# Patient Record
Sex: MALE | Race: OTHER | NOT HISPANIC OR LATINO
[De-identification: names, ages, dates, MRNs, and addresses within clinical notes are randomized per-mention and may not be internally consistent; named-entity substitution may affect disease eponyms.]

---

## 2021-01-26 ENCOUNTER — NON-APPOINTMENT (OUTPATIENT)
Age: 66
End: 2021-01-26

## 2021-01-26 ENCOUNTER — TRANSCRIPTION ENCOUNTER (OUTPATIENT)
Age: 66
End: 2021-01-26

## 2021-01-27 ENCOUNTER — APPOINTMENT (OUTPATIENT)
Dept: DISASTER EMERGENCY | Facility: HOSPITAL | Age: 66
End: 2021-01-27

## 2021-01-27 ENCOUNTER — OUTPATIENT (OUTPATIENT)
Dept: INPATIENT UNIT | Facility: HOSPITAL | Age: 66
LOS: 1 days | End: 2021-01-27
Payer: MEDICARE

## 2021-01-27 VITALS
OXYGEN SATURATION: 96 % | HEART RATE: 79 BPM | SYSTOLIC BLOOD PRESSURE: 164 MMHG | TEMPERATURE: 98 F | DIASTOLIC BLOOD PRESSURE: 89 MMHG | RESPIRATION RATE: 18 BRPM

## 2021-01-27 VITALS
SYSTOLIC BLOOD PRESSURE: 132 MMHG | OXYGEN SATURATION: 96 % | TEMPERATURE: 99 F | HEART RATE: 94 BPM | RESPIRATION RATE: 18 BRPM | DIASTOLIC BLOOD PRESSURE: 75 MMHG

## 2021-01-27 DIAGNOSIS — U07.1 COVID-19: ICD-10-CM

## 2021-01-27 PROCEDURE — M0239: CPT

## 2021-01-27 RX ORDER — BAMLANIVIMAB 35 MG/ML
700 INJECTION, SOLUTION INTRAVENOUS ONCE
Refills: 0 | Status: COMPLETED | OUTPATIENT
Start: 2021-01-27 | End: 2021-01-27

## 2021-01-27 RX ORDER — SODIUM CHLORIDE 9 MG/ML
250 INJECTION INTRAMUSCULAR; INTRAVENOUS; SUBCUTANEOUS
Refills: 0 | Status: DISCONTINUED | OUTPATIENT
Start: 2021-01-27 | End: 2021-02-10

## 2021-01-27 RX ADMIN — SODIUM CHLORIDE 25 MILLILITER(S): 9 INJECTION INTRAMUSCULAR; INTRAVENOUS; SUBCUTANEOUS at 11:05

## 2021-01-27 RX ADMIN — BAMLANIVIMAB 270 MILLIGRAM(S): 35 INJECTION, SOLUTION INTRAVENOUS at 10:35

## 2021-01-27 NOTE — CHART NOTE - NSCHARTNOTEFT_GEN_A_CORE
CC: Monoclonal Antibody Infusion/COVID 19 Positive  66yMale with PMHx HTN, HLD, [id number] symptoms of no sense of  taste smell, sore throat    exam/findings:  T(C): 36.9 (01-27-21 @ 10:50), Max: 37.2 (01-27-21 @ 10:18)  HR: 82 (01-27-21 @ 10:50) (82 - 94)  BP: 136/83 (01-27-21 @ 10:50) (132/75 - 136/83)  RR: 18 (01-27-21 @ 10:50) (18 - 18)  SpO2: 94% (01-27-21 @ 10:50) (94% - 96%)      PE:   Appearance: NAD	  HEENT:   Normal oral mucosa,   Lymphatic: No lymphadenopathy  Cardiovascular: Normal S1 S2, No JVD, No murmurs, No edema  Respiratory: Lungs clear to auscultation	  Gastrointestinal:  Soft, Non-tender, + BS	  Skin: warm and dry  Neurologic: Non-focal  Extremities: Normal range of motion, no calf tenderness or edema    ASSESSMENT:  Pt is a 67 y/o  male with PMH of HTN, HLD, T2DM Covid 19 Positive,   referred by Dr. Anthony Justice who presents to infusion center for Monoclonal antibody infusion (bamlanivimab)  symptoms/ Criteria: Sore throat, no sense of smell & taste   Risk Profile includes: T2DM    PLAN:  - infusion procedure explained to patient   -Consent for monoclonal antibody infusion obtained   - Risk & benifits discussed/all questions answered  -infuse  [id number]  IV over one hour   -observe patient for one hour post infusion        I have reviewed the  bamlamnivimabEmergency Use Authorization (EAU) and I have provided the patient or patient's caregiver with the following information:  1. FDA has authorized emergency use of bamlamnivimab, which is not FDA-approved biologic product.  2. The patient or patient's caregiver has the option to accept or refuse administration of bamlamnivimab  3. The significant known and benefits are unknown.  4. Information on available alternative treatments and risks and benefits of those alternatives.    Discharge:  Patient tolerated infusion well denies complaints of chest pain/SOB/dizziness/ palps  Vital signs --- for discharge home ---  D/C instructions given/ fact sheet included.  Patient to follow-up with PCP as needed. CC: Monoclonal Antibody Infusion/COVID 19 Positive  66yMale with PMHx HTN, HLD, O3XSiso symptoms of no sense of  taste smell, sore throat    exam/findings:  T(C): 36.9 (01-27-21 @ 10:50), Max: 37.2 (01-27-21 @ 10:18)  HR: 82 (01-27-21 @ 10:50) (82 - 94)  BP: 136/83 (01-27-21 @ 10:50) (132/75 - 136/83)  RR: 18 (01-27-21 @ 10:50) (18 - 18)  SpO2: 94% (01-27-21 @ 10:50) (94% - 96%)      PE:   Appearance: NAD	  HEENT:   Normal oral mucosa,   Lymphatic: No lymphadenopathy  Cardiovascular: Normal S1 S2, No JVD, No murmurs, No edema  Respiratory: Lungs clear to auscultation	  Gastrointestinal:  Soft, Non-tender, + BS	  Skin: warm and dry  Neurologic: Non-focal  Extremities: Normal range of motion, no calf tenderness or edema    ASSESSMENT:  Pt is a 67 y/o  male with PMH of HTN, HLD, T2DM Covid 19 Positive,   referred by Dr. Anthony Justice who presents to infusion center for Monoclonal antibody infusion (bamlanivimab)  symptoms/ Criteria: Sore throat, no sense of smell & taste   Risk Profile includes: T2DM    PLAN:  - infusion procedure explained to patient   -Consent for monoclonal antibody infusion obtained   - Risk & benifits discussed/all questions answered  -infuse  [id number]  IV over one hour   -observe patient for one hour post infusion        I have reviewed the  bamlamnivimabEmergency Use Authorization (EAU) and I have provided the patient or patient's caregiver with the following information:  1. FDA has authorized emergency use of bamlamnivimab, which is not FDA-approved biologic product.  2. The patient or patient's caregiver has the option to accept or refuse administration of bamlamnivimab  3. The significant known and benefits are unknown.  4. Information on available alternative treatments and risks and benefits of those alternatives.    Discharge:  Patient tolerated infusion well denies complaints of chest pain/SOB/dizziness/ palps  Vital signs Vital Signs Last 24 Hrs  T(C): 36.9 (27 Jan 2021 12:40), Max: 37.2 (27 Jan 2021 10:18)  T(F): 98.4 (27 Jan 2021 12:40), Max: 99 (27 Jan 2021 10:18)  HR: 79 (27 Jan 2021 12:40) (79 - 94)  BP: 164/89 (27 Jan 2021 12:40) (132/75 - 164/89)  BP(mean): --  RR: 18 (27 Jan 2021 12:40) (18 - 18)  SpO2: 96% (27 Jan 2021 12:40) (94% - 97%)- for discharge home at 12:40  D/C instructions given/ fact sheet included.  Patient to follow-up with PCP as needed.

## 2021-01-28 ENCOUNTER — TRANSCRIPTION ENCOUNTER (OUTPATIENT)
Age: 66
End: 2021-01-28

## 2021-02-01 ENCOUNTER — TRANSCRIPTION ENCOUNTER (OUTPATIENT)
Age: 66
End: 2021-02-01

## 2021-05-06 DIAGNOSIS — Z01.818 ENCOUNTER FOR OTHER PREPROCEDURAL EXAMINATION: ICD-10-CM

## 2021-05-10 ENCOUNTER — APPOINTMENT (OUTPATIENT)
Dept: DISASTER EMERGENCY | Facility: CLINIC | Age: 66
End: 2021-05-10

## 2021-05-11 LAB — SARS-COV-2 N GENE NPH QL NAA+PROBE: NOT DETECTED

## 2021-05-12 ENCOUNTER — TRANSCRIPTION ENCOUNTER (OUTPATIENT)
Age: 66
End: 2021-05-12

## 2021-05-13 ENCOUNTER — APPOINTMENT (OUTPATIENT)
Dept: GASTROENTEROLOGY | Facility: HOSPITAL | Age: 66
End: 2021-05-13

## 2021-05-13 ENCOUNTER — RESULT REVIEW (OUTPATIENT)
Age: 66
End: 2021-05-13

## 2021-05-13 ENCOUNTER — OUTPATIENT (OUTPATIENT)
Dept: OUTPATIENT SERVICES | Facility: HOSPITAL | Age: 66
LOS: 1 days | End: 2021-05-13
Payer: MEDICARE

## 2021-05-13 DIAGNOSIS — K83.8 OTHER SPECIFIED DISEASES OF BILIARY TRACT: ICD-10-CM

## 2021-05-13 DIAGNOSIS — K80.20 CALCULUS OF GALLBLADDER W/OUT CHOLECYSTITIS W/OUT OBSTRUCTION: ICD-10-CM

## 2021-05-13 DIAGNOSIS — K86.9 DISEASE OF PANCREAS, UNSPECIFIED: ICD-10-CM

## 2021-05-13 LAB — GLUCOSE BLDC GLUCOMTR-MCNC: 126 MG/DL — HIGH (ref 70–99)

## 2021-05-13 PROCEDURE — 43238 EGD US FINE NEEDLE BX/ASPIR: CPT

## 2021-05-13 PROCEDURE — 43274 ERCP DUCT STENT PLACEMENT: CPT

## 2021-05-13 PROCEDURE — 74330 X-RAY BILE/PANC ENDOSCOPY: CPT

## 2021-05-13 PROCEDURE — 43274 ERCP DUCT STENT PLACEMENT: CPT | Mod: 59

## 2021-05-13 PROCEDURE — 88342 IMHCHEM/IMCYTCHM 1ST ANTB: CPT | Mod: 26

## 2021-05-13 PROCEDURE — 88305 TISSUE EXAM BY PATHOLOGIST: CPT

## 2021-05-13 PROCEDURE — 43264 ERCP REMOVE DUCT CALCULI: CPT

## 2021-05-13 PROCEDURE — 43259 EGD US EXAM DUODENUM/JEJUNUM: CPT | Mod: 59

## 2021-05-13 PROCEDURE — C1769: CPT

## 2021-05-13 PROCEDURE — 88305 TISSUE EXAM BY PATHOLOGIST: CPT | Mod: 26

## 2021-05-13 PROCEDURE — C2617: CPT

## 2021-05-13 PROCEDURE — C9399: CPT

## 2021-05-13 PROCEDURE — C1773: CPT

## 2021-05-13 PROCEDURE — 82962 GLUCOSE BLOOD TEST: CPT

## 2021-05-13 PROCEDURE — 88342 IMHCHEM/IMCYTCHM 1ST ANTB: CPT

## 2021-05-13 NOTE — PHYSICAL EXAM
[General Appearance - Alert] : alert [General Appearance - In No Acute Distress] : in no acute distress [Sclera] : the sclera and conjunctiva were normal [PERRL With Normal Accommodation] : pupils were equal in size, round, and reactive to light [Extraocular Movements] : extraocular movements were intact [Outer Ear] : the ears and nose were normal in appearance [Oropharynx] : the oropharynx was normal [Neck Appearance] : the appearance of the neck was normal [Neck Cervical Mass (___cm)] : no neck mass was observed [Jugular Venous Distention Increased] : there was no jugular-venous distention [Thyroid Diffuse Enlargement] : the thyroid was not enlarged [Thyroid Nodule] : there were no palpable thyroid nodules [Auscultation Breath Sounds / Voice Sounds] : lungs were clear to auscultation bilaterally [Heart Rate And Rhythm] : heart rate was normal and rhythm regular [Heart Sounds] : normal S1 and S2 [Heart Sounds Gallop] : no gallops [Murmurs] : no murmurs [Heart Sounds Pericardial Friction Rub] : no pericardial rub [Bowel Sounds] : normal bowel sounds [Abdomen Soft] : soft [Abdomen Tenderness] : non-tender [] : no hepato-splenomegaly [Abdomen Mass (___ Cm)] : no abdominal mass palpated [Cervical Lymph Nodes Enlarged Posterior Bilaterally] : posterior cervical [Cervical Lymph Nodes Enlarged Anterior Bilaterally] : anterior cervical [Supraclavicular Lymph Nodes Enlarged Bilaterally] : supraclavicular [No CVA Tenderness] : no ~M costovertebral angle tenderness [No Spinal Tenderness] : no spinal tenderness [Abnormal Walk] : normal gait [Nail Clubbing] : no clubbing  or cyanosis of the fingernails [Musculoskeletal - Swelling] : no joint swelling seen [Motor Tone] : muscle strength and tone were normal [No Focal Deficits] : no focal deficits [Oriented To Time, Place, And Person] : oriented to person, place, and time [Impaired Insight] : insight and judgment were intact [Affect] : the affect was normal

## 2021-05-13 NOTE — HISTORY OF PRESENT ILLNESS
[de-identified] : Patient arrived for EGD/EUS/ERCP due to history of biliary pancreatitis and also CBD stones. No complaints. Cleared by cardiology

## 2021-05-18 LAB — SURGICAL PATHOLOGY STUDY: SIGNIFICANT CHANGE UP

## 2021-07-16 ENCOUNTER — APPOINTMENT (OUTPATIENT)
Dept: GASTROENTEROLOGY | Facility: CLINIC | Age: 66
End: 2021-07-16
Payer: MEDICARE

## 2021-07-16 VITALS — BODY MASS INDEX: 31.78 KG/M2 | WEIGHT: 222 LBS | HEIGHT: 70 IN

## 2021-07-16 DIAGNOSIS — A04.8 OTHER SPECIFIED BACTERIAL INTESTINAL INFECTIONS: ICD-10-CM

## 2021-07-16 DIAGNOSIS — Z96.89 PRESENCE OF OTHER SPECIFIED FUNCTIONAL IMPLANTS: ICD-10-CM

## 2021-07-16 DIAGNOSIS — K86.9 DISEASE OF PANCREAS, UNSPECIFIED: ICD-10-CM

## 2021-07-16 DIAGNOSIS — K80.51 CALCULUS OF BILE DUCT W/OUT CHOLANGITIS OR CHOLECYSTITIS WITH OBSTRUCTION: ICD-10-CM

## 2021-07-16 PROCEDURE — 99213 OFFICE O/P EST LOW 20 MIN: CPT | Mod: 95

## 2021-07-16 RX ORDER — CLARITHROMYCIN 500 MG/1
500 TABLET, FILM COATED ORAL TWICE DAILY
Qty: 28 | Refills: 0 | Status: COMPLETED | COMMUNITY
Start: 2021-05-19 | End: 2021-07-16

## 2021-07-16 RX ORDER — OMEPRAZOLE 20 MG/1
20 CAPSULE, DELAYED RELEASE ORAL
Qty: 28 | Refills: 0 | Status: COMPLETED | COMMUNITY
Start: 2021-05-19 | End: 2021-07-16

## 2021-07-16 RX ORDER — AMOXICILLIN 500 MG/1
500 TABLET, FILM COATED ORAL TWICE DAILY
Qty: 56 | Refills: 0 | Status: COMPLETED | COMMUNITY
Start: 2021-05-19 | End: 2021-07-16

## 2021-07-16 RX ORDER — CHLORTHALIDONE 25 MG/1
25 TABLET ORAL
Refills: 0 | Status: ACTIVE | COMMUNITY

## 2021-07-16 RX ORDER — URSODIOL 250 MG/1
250 TABLET ORAL TWICE DAILY
Qty: 6 | Refills: 3 | Status: COMPLETED | COMMUNITY
Start: 2020-10-23 | End: 2021-07-16

## 2021-07-16 RX ORDER — ENALAPRIL MALEATE 10 MG/1
10 TABLET ORAL
Refills: 0 | Status: ACTIVE | COMMUNITY

## 2021-07-16 NOTE — REVIEW OF SYSTEMS
Detail Level: Zone
Initiate Treatment: - finacea gel apply to affected area face and back once to twice daily
Plan: - patient no pregnant, planning or breastfeeding\\n- follow up in 3 months for 1st acne check \\nPatient may cancel appointment if improved\\nDiscussed with patient risks vs benefits vs adverse effects
[Negative] : Heme/Lymph

## 2021-07-19 NOTE — HISTORY OF PRESENT ILLNESS
[Home] : at home, [unfilled] , at the time of the visit. [Medical Office: ([facility])___] : at the medical office located in  [Verbal consent obtained from patient] : the patient, [unfilled] [de-identified] : The patient has been evaluated in the past for bili pancreatitis and CBD stones.  He has undergone EGD/EUS and then subsequent ERCP.  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  Noted to have Helicobacter pylori infection.  Eradication response has not been checked.  ERCP with bile duct stone removal was performed successfully.  Pancreatic duct stent which was placed has migrated.  The patient is denying any complaints.  He had a colonoscopy in the past.  The last colonoscopy was 7 years ago.  There was some problems with the bowel preparation.  He is interested in colonoscopy now.  No GI complaints.

## 2021-07-19 NOTE — ASSESSMENT
[FreeTextEntry1] : Helicobacter pylori infection: We will obtain the breath test for the eradication response.\par \par Screening colonoscopy: Patient is at average risk for colorectal cancer.  The bowel preparation was discussed at length. Risks (including bleeding, pain, perforation, incomplete examination, splenic laceration, adverse reactions to medications, aspiration and death), benefits and alternatives were discussed. Patient is agreeable for the colonoscopy. The patient is medically optimized for the procedure. We will schedule the patient for the procedure. Bowel preparation was sent to the pharmacy.\par \par Biliary pancreatitis, CBD stones: Resolved\par \par Darius Pan MD\par Gastroenterology \par \par

## 2021-09-13 DIAGNOSIS — Z01.818 ENCOUNTER FOR OTHER PREPROCEDURAL EXAMINATION: ICD-10-CM

## 2021-09-14 ENCOUNTER — APPOINTMENT (OUTPATIENT)
Dept: DISASTER EMERGENCY | Facility: CLINIC | Age: 66
End: 2021-09-14

## 2021-09-15 LAB — SARS-COV-2 N GENE NPH QL NAA+PROBE: NOT DETECTED

## 2021-09-16 ENCOUNTER — TRANSCRIPTION ENCOUNTER (OUTPATIENT)
Age: 66
End: 2021-09-16

## 2021-09-17 ENCOUNTER — RESULT REVIEW (OUTPATIENT)
Age: 66
End: 2021-09-17

## 2021-09-17 ENCOUNTER — OUTPATIENT (OUTPATIENT)
Dept: OUTPATIENT SERVICES | Facility: HOSPITAL | Age: 66
LOS: 1 days | End: 2021-09-17
Payer: MEDICARE

## 2021-09-17 ENCOUNTER — APPOINTMENT (OUTPATIENT)
Dept: GASTROENTEROLOGY | Facility: GI CENTER | Age: 66
End: 2021-09-17
Payer: MEDICARE

## 2021-09-17 DIAGNOSIS — Z12.11 ENCOUNTER FOR SCREENING FOR MALIGNANT NEOPLASM OF COLON: ICD-10-CM

## 2021-09-17 DIAGNOSIS — K57.90 DIVERTICULOSIS OF INTESTINE, PART UNSPECIFIED, W/OUT PERFORATION OR ABSCESS W/OUT BLEEDING: ICD-10-CM

## 2021-09-17 DIAGNOSIS — K64.8 OTHER HEMORRHOIDS: ICD-10-CM

## 2021-09-17 DIAGNOSIS — K63.5 POLYP OF COLON: ICD-10-CM

## 2021-09-17 PROCEDURE — 88305 TISSUE EXAM BY PATHOLOGIST: CPT

## 2021-09-17 PROCEDURE — 45380 COLONOSCOPY AND BIOPSY: CPT | Mod: PT

## 2021-09-17 PROCEDURE — 88305 TISSUE EXAM BY PATHOLOGIST: CPT | Mod: 26

## 2021-09-22 LAB — SURGICAL PATHOLOGY STUDY: SIGNIFICANT CHANGE UP

## 2021-09-30 ENCOUNTER — NON-APPOINTMENT (OUTPATIENT)
Age: 66
End: 2021-09-30

## 2021-11-20 ENCOUNTER — TRANSCRIPTION ENCOUNTER (OUTPATIENT)
Age: 66
End: 2021-11-20

## 2022-04-22 ENCOUNTER — TRANSCRIPTION ENCOUNTER (OUTPATIENT)
Age: 67
End: 2022-04-22

## 2022-04-23 ENCOUNTER — TRANSCRIPTION ENCOUNTER (OUTPATIENT)
Age: 67
End: 2022-04-23

## 2023-01-03 ENCOUNTER — INPATIENT (INPATIENT)
Facility: HOSPITAL | Age: 68
LOS: 6 days | Discharge: HOME | DRG: 417 | End: 2023-01-10
Attending: STUDENT IN AN ORGANIZED HEALTH CARE EDUCATION/TRAINING PROGRAM | Admitting: STUDENT IN AN ORGANIZED HEALTH CARE EDUCATION/TRAINING PROGRAM
Payer: MEDICARE

## 2023-01-03 VITALS
HEIGHT: 70 IN | HEART RATE: 118 BPM | OXYGEN SATURATION: 94 % | DIASTOLIC BLOOD PRESSURE: 70 MMHG | SYSTOLIC BLOOD PRESSURE: 137 MMHG | RESPIRATION RATE: 20 BRPM | WEIGHT: 222.01 LBS | TEMPERATURE: 102 F

## 2023-01-03 LAB
ALBUMIN SERPL ELPH-MCNC: 3.9 G/DL — SIGNIFICANT CHANGE UP (ref 3.3–5)
ALP SERPL-CCNC: 99 U/L — SIGNIFICANT CHANGE UP (ref 40–120)
ALT FLD-CCNC: 634 U/L — HIGH (ref 10–45)
ANION GAP SERPL CALC-SCNC: 14 MMOL/L — SIGNIFICANT CHANGE UP (ref 5–17)
ANISOCYTOSIS BLD QL: SLIGHT — SIGNIFICANT CHANGE UP
AST SERPL-CCNC: 371 U/L — HIGH (ref 10–40)
BASE EXCESS BLDV CALC-SCNC: 3.1 MMOL/L — HIGH (ref -2–3)
BASOPHILS # BLD AUTO: 0 K/UL — SIGNIFICANT CHANGE UP (ref 0–0.2)
BASOPHILS NFR BLD AUTO: 0 % — SIGNIFICANT CHANGE UP (ref 0–2)
BILIRUB SERPL-MCNC: 5.6 MG/DL — HIGH (ref 0.2–1.2)
BUN SERPL-MCNC: 25 MG/DL — HIGH (ref 7–23)
BURR CELLS BLD QL SMEAR: PRESENT — SIGNIFICANT CHANGE UP
CA-I SERPL-SCNC: 1.13 MMOL/L — LOW (ref 1.15–1.33)
CALCIUM SERPL-MCNC: 9.2 MG/DL — SIGNIFICANT CHANGE UP (ref 8.4–10.5)
CHLORIDE BLDV-SCNC: 97 MMOL/L — SIGNIFICANT CHANGE UP (ref 96–108)
CHLORIDE SERPL-SCNC: 96 MMOL/L — SIGNIFICANT CHANGE UP (ref 96–108)
CO2 BLDV-SCNC: 28 MMOL/L — HIGH (ref 22–26)
CO2 SERPL-SCNC: 25 MMOL/L — SIGNIFICANT CHANGE UP (ref 22–31)
CREAT SERPL-MCNC: 1.43 MG/DL — HIGH (ref 0.5–1.3)
EGFR: 54 ML/MIN/1.73M2 — LOW
EOSINOPHIL # BLD AUTO: 0.25 K/UL — SIGNIFICANT CHANGE UP (ref 0–0.5)
EOSINOPHIL NFR BLD AUTO: 1.8 % — SIGNIFICANT CHANGE UP (ref 0–6)
GAS PNL BLDV: 132 MMOL/L — LOW (ref 136–145)
GAS PNL BLDV: SIGNIFICANT CHANGE UP
GAS PNL BLDV: SIGNIFICANT CHANGE UP
GLUCOSE BLDV-MCNC: 238 MG/DL — HIGH (ref 70–99)
GLUCOSE SERPL-MCNC: 234 MG/DL — HIGH (ref 70–99)
HCO3 BLDV-SCNC: 27 MMOL/L — SIGNIFICANT CHANGE UP (ref 22–29)
HCT VFR BLD CALC: 41.4 % — SIGNIFICANT CHANGE UP (ref 39–50)
HCT VFR BLDA CALC: 45 % — SIGNIFICANT CHANGE UP (ref 39–51)
HGB BLD CALC-MCNC: 14.9 G/DL — SIGNIFICANT CHANGE UP (ref 12.6–17.4)
HGB BLD-MCNC: 14.4 G/DL — SIGNIFICANT CHANGE UP (ref 13–17)
LACTATE BLDV-MCNC: 1.7 MMOL/L — SIGNIFICANT CHANGE UP (ref 0.5–2)
LIDOCAIN IGE QN: 157 U/L — HIGH (ref 7–60)
LYMPHOCYTES # BLD AUTO: 0.25 K/UL — LOW (ref 1–3.3)
LYMPHOCYTES # BLD AUTO: 1.8 % — LOW (ref 13–44)
MANUAL SMEAR VERIFICATION: SIGNIFICANT CHANGE UP
MCHC RBC-ENTMCNC: 28.7 PG — SIGNIFICANT CHANGE UP (ref 27–34)
MCHC RBC-ENTMCNC: 34.8 GM/DL — SIGNIFICANT CHANGE UP (ref 32–36)
MCV RBC AUTO: 82.6 FL — SIGNIFICANT CHANGE UP (ref 80–100)
MONOCYTES # BLD AUTO: 0.77 K/UL — SIGNIFICANT CHANGE UP (ref 0–0.9)
MONOCYTES NFR BLD AUTO: 5.5 % — SIGNIFICANT CHANGE UP (ref 2–14)
NEUTROPHILS # BLD AUTO: 12.79 K/UL — HIGH (ref 1.8–7.4)
NEUTROPHILS NFR BLD AUTO: 87.3 % — HIGH (ref 43–77)
NEUTS BAND # BLD: 3.6 % — SIGNIFICANT CHANGE UP (ref 0–8)
PCO2 BLDV: 38 MMHG — LOW (ref 42–55)
PH BLDV: 7.46 — HIGH (ref 7.32–7.43)
PLAT MORPH BLD: NORMAL — SIGNIFICANT CHANGE UP
PLATELET # BLD AUTO: 229 K/UL — SIGNIFICANT CHANGE UP (ref 150–400)
PO2 BLDV: 74 MMHG — HIGH (ref 25–45)
POIKILOCYTOSIS BLD QL AUTO: SLIGHT — SIGNIFICANT CHANGE UP
POTASSIUM BLDV-SCNC: 3.2 MMOL/L — LOW (ref 3.5–5.1)
POTASSIUM SERPL-MCNC: 3.3 MMOL/L — LOW (ref 3.5–5.3)
POTASSIUM SERPL-SCNC: 3.3 MMOL/L — LOW (ref 3.5–5.3)
PROT SERPL-MCNC: 7.1 G/DL — SIGNIFICANT CHANGE UP (ref 6–8.3)
RBC # BLD: 5.01 M/UL — SIGNIFICANT CHANGE UP (ref 4.2–5.8)
RBC # FLD: 13.4 % — SIGNIFICANT CHANGE UP (ref 10.3–14.5)
RBC BLD AUTO: ABNORMAL
SAO2 % BLDV: 96.3 % — HIGH (ref 67–88)
SODIUM SERPL-SCNC: 135 MMOL/L — SIGNIFICANT CHANGE UP (ref 135–145)
TROPONIN T, HIGH SENSITIVITY RESULT: 38 NG/L — SIGNIFICANT CHANGE UP (ref 0–51)
WBC # BLD: 14.07 K/UL — HIGH (ref 3.8–10.5)
WBC # FLD AUTO: 14.07 K/UL — HIGH (ref 3.8–10.5)

## 2023-01-03 PROCEDURE — 71045 X-RAY EXAM CHEST 1 VIEW: CPT | Mod: 26

## 2023-01-03 PROCEDURE — 74177 CT ABD & PELVIS W/CONTRAST: CPT | Mod: 26,MA

## 2023-01-03 PROCEDURE — 99284 EMERGENCY DEPT VISIT MOD MDM: CPT | Mod: FS

## 2023-01-03 RX ORDER — POTASSIUM CHLORIDE 20 MEQ
40 PACKET (EA) ORAL ONCE
Refills: 0 | Status: COMPLETED | OUTPATIENT
Start: 2023-01-03 | End: 2023-01-03

## 2023-01-03 RX ORDER — PIPERACILLIN AND TAZOBACTAM 4; .5 G/20ML; G/20ML
3.38 INJECTION, POWDER, LYOPHILIZED, FOR SOLUTION INTRAVENOUS ONCE
Refills: 0 | Status: COMPLETED | OUTPATIENT
Start: 2023-01-03 | End: 2023-01-03

## 2023-01-03 RX ORDER — ACETAMINOPHEN 500 MG
975 TABLET ORAL ONCE
Refills: 0 | Status: COMPLETED | OUTPATIENT
Start: 2023-01-03 | End: 2023-01-03

## 2023-01-03 RX ORDER — SODIUM CHLORIDE 9 MG/ML
1000 INJECTION INTRAMUSCULAR; INTRAVENOUS; SUBCUTANEOUS ONCE
Refills: 0 | Status: COMPLETED | OUTPATIENT
Start: 2023-01-03 | End: 2023-01-03

## 2023-01-03 RX ADMIN — Medication 40 MILLIEQUIVALENT(S): at 22:47

## 2023-01-03 RX ADMIN — SODIUM CHLORIDE 1000 MILLILITER(S): 9 INJECTION INTRAMUSCULAR; INTRAVENOUS; SUBCUTANEOUS at 22:02

## 2023-01-03 RX ADMIN — Medication 975 MILLIGRAM(S): at 22:47

## 2023-01-03 NOTE — ED PROVIDER NOTE - CLINICAL SUMMARY MEDICAL DECISION MAKING FREE TEXT BOX
Carole att-year-old male history of pancreatitis, diabetes, gallstones, cholangitis at outside hospital presenting to the ED with complaints of epigastric pain, nausea vomiting and fever T-max of 102.  Reports abdominal pain constant epigastric with significant improvement with Gas-X.  3 episodes of vomiting today.  Granddaughter at home sick with a cold.  Denies any chest pain shortness of breath or cough.  Denies urinary symptoms or daily alcohol use.  Signed  Patient is well-appearing in no acute distress.  Awake alert and oriented.  Regular rate and rhythm.  Warm to touch.  Breath sounds clear bilaterally with nonlabored breathing.  Saturating well on room air.  Abdomen is soft nontender no rebound or guarding.  No CVA tenderness.  No lower extremity edema or rashes.  No focal neurological deficits.  Given patient's history of concern for acute cholecystitis, cholangitis, choledocholithiasis.  Possible viral infection given recent sick contact.  Will obtain labs, CT abdomen pelvis.  IV fluid, and see pyretic, surgical consult if indicated and reassess.

## 2023-01-03 NOTE — ED ADULT NURSE NOTE - OBJECTIVE STATEMENT
PT is a 67 year old A&OX4 male with PMH of DM and HTN who presents to the ED from home with c/o vomiting, chills, fevers, and generalized weakness. PT states he has been vomiting since yesterday and worse today. PT unable to tolerate PO food or liquids. PT states he had an episode of dizziness and vomiting months ago, and then 3 weeks ago had an episode of vertigo and was seen at Urgent Care. PT also endorsing fevers of 102 orally at home since yesterday. PT denies chest pain, SOB, coughing, runny nose, dizziness, diarrhea, and dysuria. PT is resting comfortably in bed, breathing unlabored on room air, and speaking in complete sentences. Abdomen is soft, non-tender, and non-distended. Skin is warm and dry, no diaphoresis noted. No edema noted to B/L extremities. Strong strength in B/L extremities, sensation intact. IV access established 20G in left wrist. PT ambulatory with steady gait. Safety and comfort maintained. Wife at the bedside.

## 2023-01-03 NOTE — ED PROVIDER NOTE - ATTENDING APP SHARED VISIT CONTRIBUTION OF CARE
I, Kirsten Lam, performed a history and physical exam of the patient and discussed their management with the resident and /or advanced care provider. I reviewed the resident and /or ACP's note and agree with the documented findings and plan of care. I was present and available for all procedures.   see MDM

## 2023-01-03 NOTE — ED ADULT TRIAGE NOTE - WEIGHT IN LBS
222
-Burp after each feeding by supporting the baby on your lap, across your knees or on your shoulder.  Pat or rub the 's back gently.

## 2023-01-03 NOTE — ED PROVIDER NOTE - PROGRESS NOTE DETAILS
Carole attg: pt hypoxic 89-91%, denies feeling SOB, speaking in full sentences. placed on 2L NC|. pending CTAP Christa Nelson MD, Attending: Patient received at attending sign out from Dr. Valle, pt is now awake and alert, fever at home, spiky, + wbc so concerning for cholangitis but given well appeariance, very minumum elevation in lactate, not emergent or icu at this point, will get us/ct, and surg to be involved, but needs medical admission for ERCP, Dr. Anthony Mccoy, Anthony Remy are his GI, which is physician partner, will admit the pt to house hospitalist. Attending/MD Leslie. GI consult has been placed for ERCP via email, pending us result, will involve surgery team, for post-ERCP, potential cholecystectomy, that pt has been aware that he needed to but did not proceed to... Attending/MD Leslie. admission to surg service, GI paged for urgent ERCP.

## 2023-01-03 NOTE — ED PROVIDER NOTE - RAPID ASSESSMENT
67y M w/ pmhx of chronic back pain, pancreatitis, DM on metformin presents to the ED c/o nausea, vomiting, fever (tmax 100 - last night) starting last night. Had 2-3 episodes of emesis. Reports that at 6pm today he had abd pain, took Gasx and the symptoms subsided. Pt took an at home COVID test that was neg. Pt is well appearing in triage.   Jefry Zamorano ([facility]) have documented this rapid assessment note under the dictation of Gregorio Lemos) which has been reviewed and affirmed to be accurate. Patient was seen as a QDOC patient. The patient will be seen and further worked up in the main emergency department and their care will be completed by the main emergency department team along with a thorough physical exam. Receiving team will follow up on labs, analgesia, any clinical imaging, reassess and disposition as clinically indicated, all decisions regarding the progression of care will be made at their discretion. 67y M w/ pmhx of chronic back pain, pancreatitis, DM on metformin presents to the ED c/o nausea, vomiting, fever (tmax 100 - last night) starting last night. Had 2-3 episodes of emesis. Reports that at 6pm today he had abd pain, took Gasx and the symptoms subsided. Pt had previous gall stone but refused to remove it. Pt took an at home COVID test that was neg. Pt is well appearing in triage.   Jefry Zamorano ([facility]) have documented this rapid assessment note under the dictation of Gregorio Lemos) which has been reviewed and affirmed to be accurate. Patient was seen as a QDOC patient. The patient will be seen and further worked up in the main emergency department and their care will be completed by the main emergency department team along with a thorough physical exam. Receiving team will follow up on labs, analgesia, any clinical imaging, reassess and disposition as clinically indicated, all decisions regarding the progression of care will be made at their discretion. 67y M w/ pmhx of chronic back pain, pancreatitis, DM on metformin presents to the ED c/o nausea, vomiting, fever (tmax 100 - last night) starting last night. Had 2-3 episodes of emesis. Reports that at 6pm today he had abd pain, took Gasx and the symptoms subsided. Pt had previous gall stone but refused to remove it. Pt took an at home COVID test that was neg. Pt is well appearing in triage.   Jefry Zamorano ([facility]) have documented this rapid assessment note under the dictation of Gregorio Lemos) which has been reviewed and affirmed to be accurate. Patient was seen as a QDOC patient. The patient will be seen and further worked up in the main emergency department and their care will be completed by the main emergency department team along with a thorough physical exam. Receiving team will follow up on labs, analgesia, any clinical imaging, reassess and disposition as clinically indicated, all decisions regarding the progression of care will be made at their discretion.    Pt seen in triage as Qdoc/Rapid assessment with scribe. Full evaluation to be performed once patient is transferred to main ED  Gregorio Lemos MD, Facep

## 2023-01-03 NOTE — ED PROVIDER NOTE - OBJECTIVE STATEMENT
68 y/o male PMHx pancreatitis, DM, gallstones, ? cholangitis OSH now presenting to the ED with epigastric pain, N/V and fever Tmax 102. Patient reported abdominal pain was constant well localized and improved with Gasx. Patient presented to the ED as he had three episodes of bilious non blood emesis. Patient granddaughter sick with cold prior to onset of symptoms. Patient denied CP, SOB, cough, daily ETOH, urinary symptoms,

## 2023-01-03 NOTE — ED PROVIDER NOTE - NS ED ATTENDING STATEMENT MOD
This was a shared visit with the MILENA. I reviewed and verified the documentation and independently performed the documented:

## 2023-01-03 NOTE — ED ADULT TRIAGE NOTE - CHIEF COMPLAINT QUOTE
generalized abdominal pain, fever, chills, nausea and vomiting beginning yesterday. Hx DM. Denies CP/SOB

## 2023-01-03 NOTE — ED ADULT NURSE NOTE - ED STAT RN HANDOFF DETAILS 2
Report given to receiving change of shift MARISELA CALL patient is in no acute distress. Patient vital signs stable, plan of care explained.

## 2023-01-04 ENCOUNTER — TRANSCRIPTION ENCOUNTER (OUTPATIENT)
Age: 68
End: 2023-01-04

## 2023-01-04 DIAGNOSIS — K81.9 CHOLECYSTITIS, UNSPECIFIED: ICD-10-CM

## 2023-01-04 LAB
ALBUMIN SERPL ELPH-MCNC: 3.2 G/DL — LOW (ref 3.3–5)
ALP SERPL-CCNC: 78 U/L — SIGNIFICANT CHANGE UP (ref 40–120)
ALT FLD-CCNC: 471 U/L — HIGH (ref 10–45)
ANION GAP SERPL CALC-SCNC: 11 MMOL/L — SIGNIFICANT CHANGE UP (ref 5–17)
APPEARANCE UR: CLEAR — SIGNIFICANT CHANGE UP
APTT BLD: 37.1 SEC — HIGH (ref 27.5–35.5)
AST SERPL-CCNC: 250 U/L — HIGH (ref 10–40)
BACTERIA # UR AUTO: NEGATIVE — SIGNIFICANT CHANGE UP
BILIRUB SERPL-MCNC: 4.5 MG/DL — HIGH (ref 0.2–1.2)
BILIRUB UR-MCNC: ABNORMAL
BUN SERPL-MCNC: 20 MG/DL — SIGNIFICANT CHANGE UP (ref 7–23)
CALCIUM SERPL-MCNC: 7.6 MG/DL — LOW (ref 8.4–10.5)
CHLORIDE SERPL-SCNC: 104 MMOL/L — SIGNIFICANT CHANGE UP (ref 96–108)
CO2 SERPL-SCNC: 24 MMOL/L — SIGNIFICANT CHANGE UP (ref 22–31)
COLOR SPEC: ABNORMAL
CREAT SERPL-MCNC: 1.26 MG/DL — SIGNIFICANT CHANGE UP (ref 0.5–1.3)
DIFF PNL FLD: NEGATIVE — SIGNIFICANT CHANGE UP
EGFR: 63 ML/MIN/1.73M2 — SIGNIFICANT CHANGE UP
EPI CELLS # UR: 1 /HPF — SIGNIFICANT CHANGE UP
GLUCOSE BLDC GLUCOMTR-MCNC: 168 MG/DL — HIGH (ref 70–99)
GLUCOSE BLDC GLUCOMTR-MCNC: 180 MG/DL — HIGH (ref 70–99)
GLUCOSE BLDC GLUCOMTR-MCNC: 271 MG/DL — HIGH (ref 70–99)
GLUCOSE SERPL-MCNC: 157 MG/DL — HIGH (ref 70–99)
GLUCOSE UR QL: ABNORMAL
HCT VFR BLD CALC: 39.5 % — SIGNIFICANT CHANGE UP (ref 39–50)
HGB BLD-MCNC: 13.3 G/DL — SIGNIFICANT CHANGE UP (ref 13–17)
HYALINE CASTS # UR AUTO: 1 /LPF — SIGNIFICANT CHANGE UP (ref 0–2)
INR BLD: 1.18 RATIO — HIGH (ref 0.88–1.16)
KETONES UR-MCNC: NEGATIVE — SIGNIFICANT CHANGE UP
LEUKOCYTE ESTERASE UR-ACNC: NEGATIVE — SIGNIFICANT CHANGE UP
MAGNESIUM SERPL-MCNC: 1.7 MG/DL — SIGNIFICANT CHANGE UP (ref 1.6–2.6)
MCHC RBC-ENTMCNC: 28.6 PG — SIGNIFICANT CHANGE UP (ref 27–34)
MCHC RBC-ENTMCNC: 33.7 GM/DL — SIGNIFICANT CHANGE UP (ref 32–36)
MCV RBC AUTO: 84.9 FL — SIGNIFICANT CHANGE UP (ref 80–100)
NITRITE UR-MCNC: NEGATIVE — SIGNIFICANT CHANGE UP
NRBC # BLD: 0 /100 WBCS — SIGNIFICANT CHANGE UP (ref 0–0)
PH UR: 6 — SIGNIFICANT CHANGE UP (ref 5–8)
PHOSPHATE SERPL-MCNC: 2.2 MG/DL — LOW (ref 2.5–4.5)
PLATELET # BLD AUTO: 202 K/UL — SIGNIFICANT CHANGE UP (ref 150–400)
POTASSIUM SERPL-MCNC: 3 MMOL/L — LOW (ref 3.5–5.3)
POTASSIUM SERPL-SCNC: 3 MMOL/L — LOW (ref 3.5–5.3)
PROT SERPL-MCNC: 5.8 G/DL — LOW (ref 6–8.3)
PROT UR-MCNC: ABNORMAL
PROTHROM AB SERPL-ACNC: 13.6 SEC — HIGH (ref 10.5–13.4)
RAPID RVP RESULT: SIGNIFICANT CHANGE UP
RBC # BLD: 4.65 M/UL — SIGNIFICANT CHANGE UP (ref 4.2–5.8)
RBC # FLD: 13.7 % — SIGNIFICANT CHANGE UP (ref 10.3–14.5)
RBC CASTS # UR COMP ASSIST: 2 /HPF — SIGNIFICANT CHANGE UP (ref 0–4)
SARS-COV-2 RNA SPEC QL NAA+PROBE: SIGNIFICANT CHANGE UP
SODIUM SERPL-SCNC: 139 MMOL/L — SIGNIFICANT CHANGE UP (ref 135–145)
SP GR SPEC: 1.05 — HIGH (ref 1.01–1.02)
UROBILINOGEN FLD QL: ABNORMAL
WBC # BLD: 9.54 K/UL — SIGNIFICANT CHANGE UP (ref 3.8–10.5)
WBC # FLD AUTO: 9.54 K/UL — SIGNIFICANT CHANGE UP (ref 3.8–10.5)
WBC UR QL: 5 /HPF — SIGNIFICANT CHANGE UP (ref 0–5)

## 2023-01-04 PROCEDURE — 43237 ENDOSCOPIC US EXAM ESOPH: CPT | Mod: GC

## 2023-01-04 PROCEDURE — 99222 1ST HOSP IP/OBS MODERATE 55: CPT | Mod: GC,25

## 2023-01-04 PROCEDURE — 74328 X-RAY BILE DUCT ENDOSCOPY: CPT | Mod: 26,GC

## 2023-01-04 PROCEDURE — 43264 ERCP REMOVE DUCT CALCULI: CPT | Mod: GC

## 2023-01-04 PROCEDURE — 74183 MRI ABD W/O CNTR FLWD CNTR: CPT | Mod: 26

## 2023-01-04 PROCEDURE — 76705 ECHO EXAM OF ABDOMEN: CPT | Mod: 26

## 2023-01-04 DEVICE — BLLN EXTRACT FUSION QUATRO 8.5 10 12 15MM: Type: IMPLANTABLE DEVICE | Status: FUNCTIONAL

## 2023-01-04 DEVICE — AUTOTOME CANNULATING SPHINCTEROTOME RX 44 20MM: Type: IMPLANTABLE DEVICE | Status: FUNCTIONAL

## 2023-01-04 DEVICE — CATH BLLN EXTRACT DIST GUIDE WIRE 15MM 3LUM: Type: IMPLANTABLE DEVICE | Status: FUNCTIONAL

## 2023-01-04 DEVICE — HYDRATOME 44: Type: IMPLANTABLE DEVICE | Status: FUNCTIONAL

## 2023-01-04 RX ORDER — GLUCAGON INJECTION, SOLUTION 0.5 MG/.1ML
1 INJECTION, SOLUTION SUBCUTANEOUS ONCE
Refills: 0 | Status: DISCONTINUED | OUTPATIENT
Start: 2023-01-04 | End: 2023-01-05

## 2023-01-04 RX ORDER — DEXTROSE 50 % IN WATER 50 %
12.5 SYRINGE (ML) INTRAVENOUS ONCE
Refills: 0 | Status: DISCONTINUED | OUTPATIENT
Start: 2023-01-04 | End: 2023-01-05

## 2023-01-04 RX ORDER — DEXTROSE 50 % IN WATER 50 %
15 SYRINGE (ML) INTRAVENOUS ONCE
Refills: 0 | Status: DISCONTINUED | OUTPATIENT
Start: 2023-01-04 | End: 2023-01-05

## 2023-01-04 RX ORDER — PIPERACILLIN AND TAZOBACTAM 4; .5 G/20ML; G/20ML
3.38 INJECTION, POWDER, LYOPHILIZED, FOR SOLUTION INTRAVENOUS EVERY 8 HOURS
Refills: 0 | Status: DISCONTINUED | OUTPATIENT
Start: 2023-01-04 | End: 2023-01-05

## 2023-01-04 RX ORDER — ATORVASTATIN CALCIUM 80 MG/1
10 TABLET, FILM COATED ORAL AT BEDTIME
Refills: 0 | Status: DISCONTINUED | OUTPATIENT
Start: 2023-01-04 | End: 2023-01-05

## 2023-01-04 RX ORDER — ENOXAPARIN SODIUM 100 MG/ML
40 INJECTION SUBCUTANEOUS EVERY 24 HOURS
Refills: 0 | Status: DISCONTINUED | OUTPATIENT
Start: 2023-01-04 | End: 2023-01-05

## 2023-01-04 RX ORDER — SODIUM CHLORIDE 9 MG/ML
1000 INJECTION, SOLUTION INTRAVENOUS
Refills: 0 | Status: DISCONTINUED | OUTPATIENT
Start: 2023-01-04 | End: 2023-01-05

## 2023-01-04 RX ORDER — DEXTROSE 50 % IN WATER 50 %
25 SYRINGE (ML) INTRAVENOUS ONCE
Refills: 0 | Status: DISCONTINUED | OUTPATIENT
Start: 2023-01-04 | End: 2023-01-05

## 2023-01-04 RX ORDER — MAGNESIUM SULFATE 500 MG/ML
2 VIAL (ML) INJECTION ONCE
Refills: 0 | Status: COMPLETED | OUTPATIENT
Start: 2023-01-04 | End: 2023-01-04

## 2023-01-04 RX ORDER — INSULIN LISPRO 100/ML
VIAL (ML) SUBCUTANEOUS AT BEDTIME
Refills: 0 | Status: DISCONTINUED | OUTPATIENT
Start: 2023-01-04 | End: 2023-01-05

## 2023-01-04 RX ORDER — INSULIN LISPRO 100/ML
VIAL (ML) SUBCUTANEOUS
Refills: 0 | Status: DISCONTINUED | OUTPATIENT
Start: 2023-01-04 | End: 2023-01-04

## 2023-01-04 RX ORDER — LIDOCAINE 4 G/100G
1 CREAM TOPICAL ONCE
Refills: 0 | Status: COMPLETED | OUTPATIENT
Start: 2023-01-04 | End: 2023-01-04

## 2023-01-04 RX ORDER — INDOMETHACIN 50 MG
100 CAPSULE ORAL ONCE
Refills: 0 | Status: DISCONTINUED | OUTPATIENT
Start: 2023-01-04 | End: 2023-01-05

## 2023-01-04 RX ORDER — POTASSIUM PHOSPHATE, MONOBASIC POTASSIUM PHOSPHATE, DIBASIC 236; 224 MG/ML; MG/ML
30 INJECTION, SOLUTION INTRAVENOUS ONCE
Refills: 0 | Status: COMPLETED | OUTPATIENT
Start: 2023-01-04 | End: 2023-01-04

## 2023-01-04 RX ORDER — SODIUM CHLORIDE 9 MG/ML
1000 INJECTION, SOLUTION INTRAVENOUS
Refills: 0 | Status: DISCONTINUED | OUTPATIENT
Start: 2023-01-04 | End: 2023-01-04

## 2023-01-04 RX ORDER — METFORMIN HYDROCHLORIDE 850 MG/1
1 TABLET ORAL
Qty: 0 | Refills: 0 | DISCHARGE

## 2023-01-04 RX ORDER — ACETAMINOPHEN 500 MG
1000 TABLET ORAL ONCE
Refills: 0 | Status: COMPLETED | OUTPATIENT
Start: 2023-01-04 | End: 2023-01-04

## 2023-01-04 RX ORDER — POTASSIUM CHLORIDE 20 MEQ
10 PACKET (EA) ORAL
Refills: 0 | Status: DISCONTINUED | OUTPATIENT
Start: 2023-01-04 | End: 2023-01-07

## 2023-01-04 RX ORDER — INSULIN LISPRO 100/ML
VIAL (ML) SUBCUTANEOUS
Refills: 0 | Status: DISCONTINUED | OUTPATIENT
Start: 2023-01-04 | End: 2023-01-05

## 2023-01-04 RX ADMIN — PIPERACILLIN AND TAZOBACTAM 200 GRAM(S): 4; .5 INJECTION, POWDER, LYOPHILIZED, FOR SOLUTION INTRAVENOUS at 00:06

## 2023-01-04 RX ADMIN — ENOXAPARIN SODIUM 40 MILLIGRAM(S): 100 INJECTION SUBCUTANEOUS at 05:21

## 2023-01-04 RX ADMIN — Medication 1000 MILLIGRAM(S): at 23:21

## 2023-01-04 RX ADMIN — Medication 1: at 22:51

## 2023-01-04 RX ADMIN — PIPERACILLIN AND TAZOBACTAM 25 GRAM(S): 4; .5 INJECTION, POWDER, LYOPHILIZED, FOR SOLUTION INTRAVENOUS at 05:21

## 2023-01-04 RX ADMIN — Medication 975 MILLIGRAM(S): at 06:12

## 2023-01-04 RX ADMIN — Medication 10 MILLIGRAM(S): at 23:10

## 2023-01-04 RX ADMIN — Medication 100 MILLIEQUIVALENT(S): at 12:21

## 2023-01-04 RX ADMIN — Medication 100 MILLIEQUIVALENT(S): at 10:25

## 2023-01-04 RX ADMIN — Medication 1: at 12:22

## 2023-01-04 RX ADMIN — Medication 25 GRAM(S): at 19:57

## 2023-01-04 RX ADMIN — SODIUM CHLORIDE 125 MILLILITER(S): 9 INJECTION, SOLUTION INTRAVENOUS at 08:13

## 2023-01-04 RX ADMIN — Medication 1000 MILLIGRAM(S): at 22:21

## 2023-01-04 RX ADMIN — PIPERACILLIN AND TAZOBACTAM 25 GRAM(S): 4; .5 INJECTION, POWDER, LYOPHILIZED, FOR SOLUTION INTRAVENOUS at 15:50

## 2023-01-04 RX ADMIN — SODIUM CHLORIDE 125 MILLILITER(S): 9 INJECTION, SOLUTION INTRAVENOUS at 19:55

## 2023-01-04 RX ADMIN — SODIUM CHLORIDE 150 MILLILITER(S): 9 INJECTION, SOLUTION INTRAVENOUS at 20:08

## 2023-01-04 RX ADMIN — POTASSIUM PHOSPHATE, MONOBASIC POTASSIUM PHOSPHATE, DIBASIC 83.33 MILLIMOLE(S): 236; 224 INJECTION, SOLUTION INTRAVENOUS at 23:32

## 2023-01-04 RX ADMIN — PIPERACILLIN AND TAZOBACTAM 25 GRAM(S): 4; .5 INJECTION, POWDER, LYOPHILIZED, FOR SOLUTION INTRAVENOUS at 22:52

## 2023-01-04 RX ADMIN — LIDOCAINE 1 PATCH: 4 CREAM TOPICAL at 22:20

## 2023-01-04 NOTE — ED ADULT NURSE REASSESSMENT NOTE - NS ED NURSE REASSESS COMMENT FT1
Report received on patient from BONITA Hogan, patient off unit at [facility]

## 2023-01-04 NOTE — ED ADULT NURSE REASSESSMENT NOTE - NS ED NURSE REASSESS COMMENT FT1
Patient off unit at endoscopy, report given to Yahaira on 2 Antonio, spoke with staff in Endo and they will call 2 [facility] to give their report to admitting RN, patient will be transferred to unit from [facility]. This RN never visualized/met patient as he was off unit at testing.

## 2023-01-04 NOTE — CONSULT NOTE ADULT - ASSESSMENT
68 year old male with PMHx significant for DM, HTN, past choledocho s/p ERCP 1 year ago and pancreatitis, presenting with nausea, vomiting, fevers, and chills of the past day with concern for choledocho vs cholangitis vs cholecystitis.    Impression:  #Possible choledocho/cholangitis - elevated bili and febrile with hx of choledocho.     Recommendation:  - keep NPO  - will perform EUS +/- ERCP today  - c/w ab  - IVF  - cholecystectomy per surgery    Note not finalized until signed by attending.    Tari Latham PGY-6  Gastroenterology/Hepatology Fellow  Pager #[phone] ([facility]) or [phone] ([facility])  Available on [employer] Teams.  Please contact on-call GI fellow via answering service ([phone]) after 5pm and before 8am, and on weekends.

## 2023-01-04 NOTE — PRE PROCEDURE NOTE - PRE PROCEDURE EVALUATION
Attending Physician: Dr Riley                     Procedure: EGD/ERCP    Indication for Procedure: cholangitis  ________________________________________________________  PAST MEDICAL & SURGICAL HISTORY:  Back injury      H/O acute pancreatitis      Diabetes      No significant past surgical history        ALLERGIES:  No Known Allergies    HOME MEDICATIONS:  atorvastatin 10 mg oral tablet: 1 tab(s) orally once a day (at bedtime)  chlorthalidone 25 mg oral tablet: 1 tab(s) orally once a day  enalapril 10 mg oral tablet: 1 tab(s) orally once a day  metFORMIN 500 mg oral tablet, extended release: 1 tab(s) orally once a day  Naprosyn 250 mg oral tablet: 1 tab(s) orally once a week    AICD/PPM: [ ] yes   [x ] no    PERTINENT LAB DATA:                        13.3   9.54  )-----------( 202      ( 04 Jan 2023 05:35 )             39.5     01-04    139  |  104  |  20  ----------------------------<  157<H>  3.0<L>   |  24  |  1.26    Ca    7.6<L>      04 Jan 2023 05:35  Phos  2.2     01-04  Mg     1.7     01-04    TPro  5.8<L>  /  Alb  3.2<L>  /  TBili  4.5<H>  /  DBili  x   /  AST  250<H>  /  ALT  471<H>  /  AlkPhos  78  01-04    PT/INR - ( 04 Jan 2023 14:45 )   PT: 13.6 sec;   INR: 1.18 ratio         PTT - ( 04 Jan 2023 14:45 )  PTT:37.1 sec            PHYSICAL EXAMINATION:    Height (cm): 177.8  Weight (kg): 100.7  BMI (kg/m2): 31.9  BSA (m2): 2.18T(C): 36.9  HR: 73  BP: 186/95  RR: 24  SpO2: 98%    Constitutional: NAD  HEENT: PERRLA, EOMI,    Neck:  No JVD  Respiratory: CTAB/L  Cardiovascular: S1 and S2  Gastrointestinal: BS+, soft, NT/ND  Extremities: No peripheral edema  Neurological: A/O x 3, no focal deficits  Psychiatric: Normal mood, normal affect  Skin: No rashes    ASA Class: I [ ]  II [ ]  III [x ]  IV [ ]    COMMENTS:    The patient is a suitable candidate for the planned procedure unless box checked [ ]  No, explain:

## 2023-01-04 NOTE — H&P ADULT - HISTORY OF PRESENT ILLNESS
Patient is a 68 year old male with PMHx significant for DM, HTN, and pancreatitis, presenting with nausea, vomiting, fevers, and chills of the past day. Symptoms started yesterday evening. Denies abdominal pain. Reports passing flatus and having BMs. Has a history of choledocholithiasis about 1 year ago for which he had an ERCP at that time; however, declined cholecystectomy at that time.    In ED, presented with elevated LFTs and elevated T. Bili. RUQ US in ED demonstrating gallstones. Patient is a 68 year old male with PMHx significant for DM, HTN, and pancreatitis, presenting with nausea, vomiting, fevers, and chills of the past day. Symptoms started yesterday evening. Denies abdominal pain. Reports passing flatus and having BMs. Has a history of choledocholithiasis about 1 year ago for which he had an ERCP at that time; however, declined cholecystectomy at that time.    In ED, patient febrile and found to have elevated LFTs + elevated T. Bili. RUQ US in ED demonstrating gallstones.

## 2023-01-04 NOTE — H&P ADULT - ATTENDING COMMENTS
DATE OF SERVICE: 01-04-23 @ 13:43    Seen and examined. 68M presenting with fever and mild RUQ discomfort. Has hx of choledocholithiasis and underwent ERCP 1 year ago, did not have cholecystectomy at that time. Reports chills at home prompting him to present to ED    Tm 102.1 on arrival with tachycardia  WBC initially 14, now 9.5  Bilirubin elevated 5.6 on admission, 4.5 today  US Shows 7mm cbd, + stones/sludge, mild wall thickening    Abdomen is soft, not significantly TTP, no rebound/guarding    Concern for cholangitis given fever, elevated bilirubin  GI consulted for urgent ERCP, pending today  IV abx  NPO  IVF hydration  Will plan for lap carlene this admission

## 2023-01-04 NOTE — ED ADULT NURSE REASSESSMENT NOTE - NS ED NURSE REASSESS COMMENT FT1
Report received from MARISELA WEST  Pt AAOx4, NAD, resp nonlabored, skin warm/dry, resting comfortably in bed . Pt denies headache, dizziness, chest pain, palpitations, SOB, abd pain, n/v/d, urinary symptoms, fevers, chills, weakness at this time. Pt awaiting for bed  . Safety maintained with call bell within reach

## 2023-01-04 NOTE — CHART NOTE - NSCHARTNOTEFT_GEN_A_CORE
Surgery Post Procedure Note     S/P ERCP     SUBJECTIVE:  Pt seen and examined at the bedside. Pt w/ no complaints. Denies F/C/N/V. Pt denies abdominal pain at this time.    OBJECTIVE:  Vital Signs Last 24 Hrs  T(C): 37 (2023 20:00), Max: 38.9 (2023 20:27)  T(F): 98.6 (2023 20:00), Max: 102.1 (2023 20:27)  HR: 78 (2023 20:00) (66 - 118)  BP: 142/104 (2023 20:00) (102/55 - 186/95)  BP(mean): 70 (2023 15:16) (70 - 80)  RR: 18 (2023 20:00) (16 - 24)  SpO2: 98% (2023 20:00) (93% - 100%)    Parameters below as of 2023 20:00  Patient On (Oxygen Delivery Method): room air        Physical Exam:  General: NAD, resting comfortably in bed  Neuro: A/O x 3, no focal deficits  Pulmonary: Nonlabored breathing, no respiratory distress  Cardiovascular: NSR  Abdominal: soft, nontender, ND  Extremities: WWP    LABS:                        13.3   9.54  )-----------( 202      ( 2023 05:35 )             39.5     01-04    139  |  104  |  20  ----------------------------<  157<H>  3.0<L>   |  24  |  1.26    Ca    7.6<L>      2023 05:35  Phos  2.2     01-04  Mg     1.7     01-04    TPro  5.8<L>  /  Alb  3.2<L>  /  TBili  4.5<H>  /  DBili  x   /  AST  250<H>  /  ALT  471<H>  /  AlkPhos  78  01-04    PT/INR - ( 2023 14:45 )   PT: 13.6 sec;   INR: 1.18 ratio         PTT - ( 2023 14:45 )  PTT:37.1 sec  CAPILLARY BLOOD GLUCOSE      POCT Blood Glucose.: 168 mg/dL (2023 15:24)  POCT Blood Glucose.: 180 mg/dL (2023 12:18)  POCT Blood Glucose.: 243 mg/dL (2023 20:33)    Urinalysis Basic - ( 2023 06:37 )    Color: Dark Yellow / Appearance: Clear / S.047 / pH: x  Gluc: x / Ketone: Negative  / Bili: Moderate / Urobili: 4 mg/dL   Blood: x / Protein: Trace / Nitrite: Negative   Leuk Esterase: Negative / RBC: 2 /hpf / WBC 5 /HPF   Sq Epi: x / Non Sq Epi: 1 /hpf / Bacteria: Negative      LIVER FUNCTIONS - ( 2023 05:35 )  Alb: 3.2 g/dL / Pro: 5.8 g/dL / ALK PHOS: 78 U/L / ALT: 471 U/L / AST: 250 U/L / GGT: x           ASSESSMENT:68y Male now 4hours s/p ERCP. ERCP findings consistent with cholangitis. Patient recovering well on the floor.     PLAN:  - CLD  - Trend CBC/ LFTs  - LR @ 150 for 2 L, then re-evaluate per GI   - OR planning  - Monitor for s/s of post ERCP pancreatitis     [facility], [id number]

## 2023-01-04 NOTE — H&P ADULT - NSHPLABSRESULTS_GEN_ALL_CORE
14.4   14.07 )-----------( 229      ( 03 Jan 2023 22:10 )             41.4   01-03    135  |  96  |  25<H>  ----------------------------<  234<H>  3.3<L>   |  25  |  1.43<H>    Ca    9.2      03 Jan 2023 22:10    TPro  7.1  /  Alb  3.9  /  TBili  5.6<H>  /  DBili  x   /  AST  371<H>  /  ALT  634<H>  /  AlkPhos  99  01-03      IMAGING:  < from: US Abdomen Upper Quadrant Right (01.04.23 @ 02:02) >      ACC: [id number] EXAM:  US ABDOMEN RT UPR QUADRANT                          PROCEDURE DATE:  01/04/2023          INTERPRETATION:  CLINICAL INFORMATION: Abdominal pain. Evaluate for   cholecystitis    COMPARISON: None available.    TECHNIQUE: Sonography of the right upper quadrant.    FINDINGS:  Liver: 21.1 cm Increased echogenicity.  Bile ducts: Normal caliber. Common bile duct measures 7 mm.  Gallbladder: Layering stones and sludge with minimal gallbladder wall   thickening. Negative sonographic Mary sign.  Pancreas: Visualized portions are within normal limits.  Right kidney: 13.1 cm. No hydronephrosis.  Ascites: None.  IVC: Visualized portions are within normal limits.    IMPRESSION:  Cholelithiasis with equivocal sonographic evidence of acute cholecystitis.    Hepatomegaly and hepatic steatosis.    --- End of Report ---           MOSHE AJ MD; Resident Radiologist  This document has been electronically signed.  DEMETRA SHORT MD; Attending Radiologist  This document has been electronically signed. Jan 4 2023  3:39AM    < end of copied text >

## 2023-01-04 NOTE — ED ADULT NURSE REASSESSMENT NOTE - NS ED NURSE REASSESS COMMENT FT1
Patient being transported to endoscopy  with transport personnel. Patient stable upon leaving floor.

## 2023-01-04 NOTE — H&P ADULT - ASSESSMENT
68 year old male with choledocholithiases concerning for potential cholangitis.    Plan:  - Admit to [facility], Dr. Lopez  - Urgent GI consult for ERCP - consulted, recs pending  - Obtain MRCP  - NPO  - IV fluid resuscitation  - IV abx: Zosyn  - Will need formal med rec (patient states home meds include enalapril, HCTZ, and metformin but does not recall doses or frequency)    Discussed with surgery attending on call, Dr. Lopez.      [facility]  [id number]

## 2023-01-04 NOTE — CONSULT NOTE ADULT - ATTENDING COMMENTS
Patient seen and examined on 1/4/2022 in the mid afternoon.    Impression:    #1.  Recent upper abdominal pain  #2.  Fever  #3.  Jaundice with abnormal LFTs  #4.  Cholelithiasis  #5.  History of ERCP with extraction of biliary sludge 1 year ago    Recommendations:    #1.  Remain n.p.o. except medications  #2.  Follow CBC/LFTs  #3.  INR/PTT ordered  #4.  Continue IV antibiotics   #5.  IV fluids   #6.  EGD/Upper EUS/possible ERCP today Patient seen and examined on 1/4/2023 in the mid afternoon.    Impression:    #1.  Recent upper abdominal pain  #2.  Fever  #3.  Jaundice with abnormal LFTs  #4.  Cholelithiasis  #5.  History of ERCP with extraction of biliary sludge 1 year ago    Recommendations:    #1.  Remain n.p.o. except medications  #2.  Follow CBC/LFTs  #3.  INR/PTT ordered  #4.  Continue IV antibiotics   #5.  IV fluids   #6.  EGD/Upper EUS/possible ERCP today

## 2023-01-04 NOTE — H&P ADULT - NSHPPHYSICALEXAM_GEN_ALL_CORE
Gen: NAD  CV: Low grade tachycardic when evaluated  Resp: Nasal cannula - 2L O2 - in place  Abd: Soft, nondistended, nontender  Ext: Warm and perfused

## 2023-01-05 LAB
A1C WITH ESTIMATED AVERAGE GLUCOSE RESULT: 7.3 % — HIGH (ref 4–5.6)
ALBUMIN SERPL ELPH-MCNC: 3.6 G/DL — SIGNIFICANT CHANGE UP (ref 3.3–5)
ALBUMIN SERPL ELPH-MCNC: 3.6 G/DL — SIGNIFICANT CHANGE UP (ref 3.3–5)
ALP SERPL-CCNC: 94 U/L — SIGNIFICANT CHANGE UP (ref 40–120)
ALP SERPL-CCNC: 98 U/L — SIGNIFICANT CHANGE UP (ref 40–120)
ALT FLD-CCNC: 357 U/L — HIGH (ref 10–45)
ALT FLD-CCNC: 394 U/L — HIGH (ref 10–45)
ANION GAP SERPL CALC-SCNC: 13 MMOL/L — SIGNIFICANT CHANGE UP (ref 5–17)
APTT BLD: 36.1 SEC — HIGH (ref 27.5–35.5)
AST SERPL-CCNC: 126 U/L — HIGH (ref 10–40)
AST SERPL-CCNC: 150 U/L — HIGH (ref 10–40)
BILIRUB DIRECT SERPL-MCNC: 1.7 MG/DL — HIGH (ref 0–0.3)
BILIRUB INDIRECT FLD-MCNC: 1.2 MG/DL — HIGH (ref 0.2–1)
BILIRUB SERPL-MCNC: 2.6 MG/DL — HIGH (ref 0.2–1.2)
BILIRUB SERPL-MCNC: 2.9 MG/DL — HIGH (ref 0.2–1.2)
BLD GP AB SCN SERPL QL: NEGATIVE — SIGNIFICANT CHANGE UP
BUN SERPL-MCNC: 19 MG/DL — SIGNIFICANT CHANGE UP (ref 7–23)
CALCIUM SERPL-MCNC: 8.9 MG/DL — SIGNIFICANT CHANGE UP (ref 8.4–10.5)
CHLORIDE SERPL-SCNC: 97 MMOL/L — SIGNIFICANT CHANGE UP (ref 96–108)
CO2 SERPL-SCNC: 23 MMOL/L — SIGNIFICANT CHANGE UP (ref 22–31)
CREAT SERPL-MCNC: 1.36 MG/DL — HIGH (ref 0.5–1.3)
CULTURE RESULTS: NO GROWTH — SIGNIFICANT CHANGE UP
EGFR: 57 ML/MIN/1.73M2 — LOW
ENTEROCOC DNA BLD POS QL NAA+NON-PROBE: SIGNIFICANT CHANGE UP
ESTIMATED AVERAGE GLUCOSE: 163 MG/DL — HIGH (ref 68–114)
GLUCOSE BLDC GLUCOMTR-MCNC: 201 MG/DL — HIGH (ref 70–99)
GLUCOSE BLDC GLUCOMTR-MCNC: 203 MG/DL — HIGH (ref 70–99)
GLUCOSE BLDC GLUCOMTR-MCNC: 235 MG/DL — HIGH (ref 70–99)
GLUCOSE SERPL-MCNC: 208 MG/DL — HIGH (ref 70–99)
GRAM STN FLD: SIGNIFICANT CHANGE UP
HCT VFR BLD CALC: 37.8 % — LOW (ref 39–50)
HCV AB S/CO SERPL IA: 0.09 S/CO — SIGNIFICANT CHANGE UP (ref 0–0.99)
HCV AB SERPL-IMP: SIGNIFICANT CHANGE UP
HGB BLD-MCNC: 12.8 G/DL — LOW (ref 13–17)
INR BLD: 1.09 RATIO — SIGNIFICANT CHANGE UP (ref 0.88–1.16)
MAGNESIUM SERPL-MCNC: 2.3 MG/DL — SIGNIFICANT CHANGE UP (ref 1.6–2.6)
MCHC RBC-ENTMCNC: 28.4 PG — SIGNIFICANT CHANGE UP (ref 27–34)
MCHC RBC-ENTMCNC: 33.9 GM/DL — SIGNIFICANT CHANGE UP (ref 32–36)
MCV RBC AUTO: 84 FL — SIGNIFICANT CHANGE UP (ref 80–100)
METHOD TYPE: SIGNIFICANT CHANGE UP
NRBC # BLD: 0 /100 WBCS — SIGNIFICANT CHANGE UP (ref 0–0)
PHOSPHATE SERPL-MCNC: 4.5 MG/DL — SIGNIFICANT CHANGE UP (ref 2.5–4.5)
PLATELET # BLD AUTO: 214 K/UL — SIGNIFICANT CHANGE UP (ref 150–400)
POTASSIUM SERPL-MCNC: 3.9 MMOL/L — SIGNIFICANT CHANGE UP (ref 3.5–5.3)
POTASSIUM SERPL-SCNC: 3.9 MMOL/L — SIGNIFICANT CHANGE UP (ref 3.5–5.3)
PROT SERPL-MCNC: 6.6 G/DL — SIGNIFICANT CHANGE UP (ref 6–8.3)
PROT SERPL-MCNC: 6.8 G/DL — SIGNIFICANT CHANGE UP (ref 6–8.3)
PROTHROM AB SERPL-ACNC: 12.7 SEC — SIGNIFICANT CHANGE UP (ref 10.5–13.4)
RBC # BLD: 4.5 M/UL — SIGNIFICANT CHANGE UP (ref 4.2–5.8)
RBC # FLD: 13.9 % — SIGNIFICANT CHANGE UP (ref 10.3–14.5)
RH IG SCN BLD-IMP: POSITIVE — SIGNIFICANT CHANGE UP
SODIUM SERPL-SCNC: 133 MMOL/L — LOW (ref 135–145)
SPECIMEN SOURCE: SIGNIFICANT CHANGE UP
WBC # BLD: 9.27 K/UL — SIGNIFICANT CHANGE UP (ref 3.8–10.5)
WBC # FLD AUTO: 9.27 K/UL — SIGNIFICANT CHANGE UP (ref 3.8–10.5)

## 2023-01-05 PROCEDURE — 88304 TISSUE EXAM BY PATHOLOGIST: CPT | Mod: 26

## 2023-01-05 DEVICE — SURGICEL 4 X 8": Type: IMPLANTABLE DEVICE | Status: FUNCTIONAL

## 2023-01-05 DEVICE — VISTASEAL FIBRIN HUMAN 4ML: Type: IMPLANTABLE DEVICE | Status: FUNCTIONAL

## 2023-01-05 DEVICE — CLIP APPLIER COVIDIEN ENDOCLIP II 10MM MED/LG: Type: IMPLANTABLE DEVICE | Status: FUNCTIONAL

## 2023-01-05 RX ORDER — HYDROMORPHONE HYDROCHLORIDE 2 MG/ML
1 INJECTION INTRAMUSCULAR; INTRAVENOUS; SUBCUTANEOUS
Refills: 0 | Status: DISCONTINUED | OUTPATIENT
Start: 2023-01-05 | End: 2023-01-05

## 2023-01-05 RX ORDER — ONDANSETRON 8 MG/1
4 TABLET, FILM COATED ORAL ONCE
Refills: 0 | Status: DISCONTINUED | OUTPATIENT
Start: 2023-01-05 | End: 2023-01-05

## 2023-01-05 RX ORDER — DEXTROSE 50 % IN WATER 50 %
15 SYRINGE (ML) INTRAVENOUS ONCE
Refills: 0 | Status: DISCONTINUED | OUTPATIENT
Start: 2023-01-05 | End: 2023-01-10

## 2023-01-05 RX ORDER — PIPERACILLIN AND TAZOBACTAM 4; .5 G/20ML; G/20ML
3.38 INJECTION, POWDER, LYOPHILIZED, FOR SOLUTION INTRAVENOUS EVERY 8 HOURS
Refills: 0 | Status: DISCONTINUED | OUTPATIENT
Start: 2023-01-05 | End: 2023-01-10

## 2023-01-05 RX ORDER — ATORVASTATIN CALCIUM 80 MG/1
10 TABLET, FILM COATED ORAL AT BEDTIME
Refills: 0 | Status: DISCONTINUED | OUTPATIENT
Start: 2023-01-05 | End: 2023-01-10

## 2023-01-05 RX ORDER — INSULIN LISPRO 100/ML
VIAL (ML) SUBCUTANEOUS
Refills: 0 | Status: DISCONTINUED | OUTPATIENT
Start: 2023-01-05 | End: 2023-01-10

## 2023-01-05 RX ORDER — LIDOCAINE 4 G/100G
1 CREAM TOPICAL EVERY 24 HOURS
Refills: 0 | Status: DISCONTINUED | OUTPATIENT
Start: 2023-01-05 | End: 2023-01-05

## 2023-01-05 RX ORDER — LIDOCAINE 4 G/100G
1 CREAM TOPICAL EVERY 24 HOURS
Refills: 0 | Status: DISCONTINUED | OUTPATIENT
Start: 2023-01-05 | End: 2023-01-07

## 2023-01-05 RX ORDER — SODIUM CHLORIDE 9 MG/ML
1000 INJECTION, SOLUTION INTRAVENOUS
Refills: 0 | Status: DISCONTINUED | OUTPATIENT
Start: 2023-01-05 | End: 2023-01-10

## 2023-01-05 RX ORDER — OXYCODONE HYDROCHLORIDE 5 MG/1
5 TABLET ORAL EVERY 4 HOURS
Refills: 0 | Status: DISCONTINUED | OUTPATIENT
Start: 2023-01-05 | End: 2023-01-10

## 2023-01-05 RX ORDER — HYDROMORPHONE HYDROCHLORIDE 2 MG/ML
0.5 INJECTION INTRAMUSCULAR; INTRAVENOUS; SUBCUTANEOUS
Refills: 0 | Status: DISCONTINUED | OUTPATIENT
Start: 2023-01-05 | End: 2023-01-05

## 2023-01-05 RX ORDER — OXYCODONE HYDROCHLORIDE 5 MG/1
2.5 TABLET ORAL EVERY 4 HOURS
Refills: 0 | Status: DISCONTINUED | OUTPATIENT
Start: 2023-01-05 | End: 2023-01-10

## 2023-01-05 RX ORDER — DEXTROSE 50 % IN WATER 50 %
25 SYRINGE (ML) INTRAVENOUS ONCE
Refills: 0 | Status: DISCONTINUED | OUTPATIENT
Start: 2023-01-05 | End: 2023-01-10

## 2023-01-05 RX ORDER — GLUCAGON INJECTION, SOLUTION 0.5 MG/.1ML
1 INJECTION, SOLUTION SUBCUTANEOUS ONCE
Refills: 0 | Status: DISCONTINUED | OUTPATIENT
Start: 2023-01-05 | End: 2023-01-10

## 2023-01-05 RX ORDER — DEXTROSE 50 % IN WATER 50 %
12.5 SYRINGE (ML) INTRAVENOUS ONCE
Refills: 0 | Status: DISCONTINUED | OUTPATIENT
Start: 2023-01-05 | End: 2023-01-10

## 2023-01-05 RX ORDER — ACETAMINOPHEN 500 MG
650 TABLET ORAL EVERY 6 HOURS
Refills: 0 | Status: DISCONTINUED | OUTPATIENT
Start: 2023-01-05 | End: 2023-01-10

## 2023-01-05 RX ORDER — INSULIN LISPRO 100/ML
VIAL (ML) SUBCUTANEOUS AT BEDTIME
Refills: 0 | Status: DISCONTINUED | OUTPATIENT
Start: 2023-01-05 | End: 2023-01-10

## 2023-01-05 RX ADMIN — ATORVASTATIN CALCIUM 10 MILLIGRAM(S): 80 TABLET, FILM COATED ORAL at 21:48

## 2023-01-05 RX ADMIN — OXYCODONE HYDROCHLORIDE 5 MILLIGRAM(S): 5 TABLET ORAL at 22:40

## 2023-01-05 RX ADMIN — LIDOCAINE 1 PATCH: 4 CREAM TOPICAL at 19:33

## 2023-01-05 RX ADMIN — LIDOCAINE 1 PATCH: 4 CREAM TOPICAL at 21:37

## 2023-01-05 RX ADMIN — Medication 650 MILLIGRAM(S): at 19:39

## 2023-01-05 RX ADMIN — LIDOCAINE 1 PATCH: 4 CREAM TOPICAL at 10:04

## 2023-01-05 RX ADMIN — HYDROMORPHONE HYDROCHLORIDE 0.5 MILLIGRAM(S): 2 INJECTION INTRAMUSCULAR; INTRAVENOUS; SUBCUTANEOUS at 17:15

## 2023-01-05 RX ADMIN — HYDROMORPHONE HYDROCHLORIDE 0.5 MILLIGRAM(S): 2 INJECTION INTRAMUSCULAR; INTRAVENOUS; SUBCUTANEOUS at 17:32

## 2023-01-05 RX ADMIN — OXYCODONE HYDROCHLORIDE 5 MILLIGRAM(S): 5 TABLET ORAL at 21:47

## 2023-01-05 RX ADMIN — Medication 2: at 16:37

## 2023-01-05 RX ADMIN — PIPERACILLIN AND TAZOBACTAM 25 GRAM(S): 4; .5 INJECTION, POWDER, LYOPHILIZED, FOR SOLUTION INTRAVENOUS at 05:09

## 2023-01-05 RX ADMIN — Medication 650 MILLIGRAM(S): at 19:36

## 2023-01-05 RX ADMIN — Medication 10 MILLIGRAM(S): at 22:00

## 2023-01-05 RX ADMIN — LIDOCAINE 1 PATCH: 4 CREAM TOPICAL at 06:47

## 2023-01-05 RX ADMIN — PIPERACILLIN AND TAZOBACTAM 25 GRAM(S): 4; .5 INJECTION, POWDER, LYOPHILIZED, FOR SOLUTION INTRAVENOUS at 21:47

## 2023-01-05 RX ADMIN — HYDROMORPHONE HYDROCHLORIDE 0.5 MILLIGRAM(S): 2 INJECTION INTRAMUSCULAR; INTRAVENOUS; SUBCUTANEOUS at 16:38

## 2023-01-05 RX ADMIN — Medication 2: at 09:20

## 2023-01-05 RX ADMIN — LIDOCAINE 1 PATCH: 4 CREAM TOPICAL at 10:37

## 2023-01-05 RX ADMIN — HYDROMORPHONE HYDROCHLORIDE 0.5 MILLIGRAM(S): 2 INJECTION INTRAMUSCULAR; INTRAVENOUS; SUBCUTANEOUS at 16:28

## 2023-01-05 RX ADMIN — ENOXAPARIN SODIUM 40 MILLIGRAM(S): 100 INJECTION SUBCUTANEOUS at 04:36

## 2023-01-05 NOTE — PROGRESS NOTE ADULT - ASSESSMENT
68M Hx significant for DM, HTN, past Hx choledocho s/p ERCP 1 year ago and pancreatitis, presenting with n/v/f/c found to have ascending cholangitis 2/2 choledocholithiasis s/p ERCP (1/4/23) with balloon sweeps.       Impression:  #Cholangitis 2/2 choledocholithiasis: pw fevers, RUQ abd pain, n/v with elevated liver enzymes (Tbili 5.6). Underwent EUS/ERCP (1/4/23): found to have ascending cholangitis. Sludge, stones, and pus were completely removed from the dilated common bile duct with balloon sweeps.    #Esophagitis: grade B seen on EGD (1/4/23)      Recommendations:   - if tolerating clear liquid diet, ok to advance   - Trend CBC/LFTs   - Continue antibiotics for cholangitis for total course of 7-10 days.   - Cholecystectomy, timing per surgery.        *Note not final unless signed by attending    Thank you for involving us in the care of this patient, please reach out if any further questions.     Shay Bennett MD  Gastroenterology/Hepatology Fellow, PGY6    Available on [employer] Teams  [phone] ([facility])  [id number] ([facility])  Please contact on call fellow weekdays after 5pm-7am and weekends: [phone]

## 2023-01-05 NOTE — PROGRESS NOTE ADULT - ASSESSMENT
68 year old male with choledocholithiases concerning for potential cholangitis now s/p ERCP 01/05.     Plan:  - CLD  - IVF  - IV abx: Zosyn  - interval lap carlene plan   - f/u GI recs  - f/u am labs  - monitor vital signs   - PT eval    [facility]  [id number]

## 2023-01-05 NOTE — PRE-ANESTHESIA EVALUATION ADULT - NSANTHOSAYNRD_GEN_A_CORE
No. CHARI screening performed.  STOP BANG Legend: 0-2 = LOW Risk; 3-4 = INTERMEDIATE Risk; 5-8 = HIGH Risk
No. CHARI screening performed.  STOP BANG Legend: 0-2 = LOW Risk; 3-4 = INTERMEDIATE Risk; 5-8 = HIGH Risk

## 2023-01-05 NOTE — PROGRESS NOTE ADULT - SUBJECTIVE AND OBJECTIVE BOX
Interval Events:   NAEON, afebrile HD stable  Pending labs    EGD (23): LA Grade B esophagitis. Non-bleeding erosive gastropathy. Duodenal erosions without bleeding.  EUS (23): sludge and stone debris in a dilated common bile duct. Sludge in the gallbladder   ERCP (23): ascending cholangitis. Sludge, stones, and pus were completely removed from the dilated common bile duct with balloon sweeps.    Allergies:  No Known Allergies        Hospital Medications:  atorvastatin 10 milliGRAM(s) Oral at bedtime  dextrose 5%. 1000 milliLiter(s) IV Continuous <Continuous>  dextrose 5%. 1000 milliLiter(s) IV Continuous <Continuous>  dextrose 50% Injectable 25 Gram(s) IV Push once  dextrose 50% Injectable 12.5 Gram(s) IV Push once  dextrose 50% Injectable 25 Gram(s) IV Push once  dextrose Oral Gel 15 Gram(s) Oral once PRN  enalapril 10 milliGRAM(s) Oral daily  enoxaparin Injectable 40 milliGRAM(s) SubCutaneous every 24 hours  glucagon  Injectable 1 milliGRAM(s) IntraMuscular once  indomethacin Suppository 100 milliGRAM(s) Rectal once  insulin lispro (ADMELOG) corrective regimen sliding scale   SubCutaneous three times a day before meals  insulin lispro (ADMELOG) corrective regimen sliding scale   SubCutaneous at bedtime  lactated ringers. 1000 milliLiter(s) IV Continuous <Continuous>  piperacillin/tazobactam IVPB.. 3.375 Gram(s) IV Intermittent every 8 hours  potassium chloride  10 mEq/100 mL IVPB 10 milliEquivalent(s) IV Intermittent every 1 hour      PMHX/PSHX:  Back injury    H/O acute pancreatitis    Diabetes    No significant past surgical history        Family history:      ROS:     General:  No wt loss, fevers, chills, night sweats, fatigue,   Eyes:  Good vision, no reported pain  ENT:  No sore throat, pain, runny nose, dysphagia  CV:  No pain, palpitations, hypo/hypertension  Pulm:  No dyspnea, cough, tachypnea, wheezing  GI: see above  :  No pain, bleeding, incontinence, nocturia  Muscle:  No pain, weakness  Neuro:  No weakness, tingling, memory problems  Psych:  No fatigue, insomnia, mood problems, depression  Endocrine:  No polyuria, polydipsia, cold/heat intolerance  Heme:  No petechiae, ecchymosis, easy bruisability  Skin:  No rash, tattoos, scars, edema      PHYSICAL EXAM:   Vital Signs:  Vital Signs Last 24 Hrs  T(C): 36.8 (2023 05:08), Max: 37 (2023 20:00)  T(F): 98.2 (2023 05:08), Max: 98.6 (2023 20:00)  HR: 67 (2023 05:08) (61 - 78)  BP: 130/79 (2023 05:08) (121/76 - 186/95)  BP(mean): 70 (2023 15:16) (70 - 70)  RR: 18 (2023 05:08) (18 - 24)  SpO2: 97% (2023 05:08) (97% - 100%)    Parameters below as of 2023 05:08  Patient On (Oxygen Delivery Method): room air      Daily Height in cm: 177.8 (2023 15:16)    Daily     GENERAL:  No acute distress  HEENT:  Normocephalic/atraumtic,  no scleral icterus  CHEST:  Clear to auscultation bilaterally, no wheezes/rales/ronchi, no accessory muscle use  HEART:  Regular rate and rhythm, no murmurs/rubs/gallops  ABDOMEN:  Soft, non-tender, non-distended  EXTREMITIES:  No cyanosis, clubbing, or edema  SKIN:  No rash/erythema/ecchymoses/petechiae/wounds/abscess/warm/dry  NEURO:  Alert and oriented x 3, no asterixis, no tremor    LABS:                        13.3   9.54  )-----------( 202      ( 2023 05:35 )             39.5     Mean Cell Volume: 84.9 fl (23 @ 05:35)    -    139  |  104  |  20  ----------------------------<  157<H>  3.0<L>   |  24  |  1.26    Ca    7.6<L>      2023 05:35  Phos  2.2     -04  Mg     1.7     -04    TPro  5.8<L>  /  Alb  3.2<L>  /  TBili  4.5<H>  /  DBili  x   /  AST  250<H>  /  ALT  471<H>  /  AlkPhos  78  -04    LIVER FUNCTIONS - ( 2023 05:35 )  Alb: 3.2 g/dL / Pro: 5.8 g/dL / ALK PHOS: 78 U/L / ALT: 471 U/L / AST: 250 U/L / GGT: x           PT/INR - ( 2023 14:45 )   PT: 13.6 sec;   INR: 1.18 ratio         PTT - ( 2023 14:45 )  PTT:37.1 sec  Urinalysis Basic - ( 2023 06:37 )    Color: Dark Yellow / Appearance: Clear / S.047 / pH: x  Gluc: x / Ketone: Negative  / Bili: Moderate / Urobili: 4 mg/dL   Blood: x / Protein: Trace / Nitrite: Negative   Leuk Esterase: Negative / RBC: 2 /hpf / WBC 5 /HPF   Sq Epi: x / Non Sq Epi: 1 /hpf / Bacteria: Negative                              13.3   9.54  )-----------( 202      ( 2023 05:35 )             39.5                         14.4   14.07 )-----------( 229      ( 2023 22:10 )             41.4       Imaging:             Interval Events:   NAEON, afebrile HD stable  Denies abd pain/nv    EGD (23): LA Grade B esophagitis. Non-bleeding erosive gastropathy. Duodenal erosions without bleeding.  EUS (23): sludge and stone debris in a dilated common bile duct. Sludge in the gallbladder   ERCP (23): ascending cholangitis. Sludge, stones, and pus were completely removed from the dilated common bile duct with balloon sweeps.    Allergies:  No Known Allergies        Hospital Medications:  atorvastatin 10 milliGRAM(s) Oral at bedtime  dextrose 5%. 1000 milliLiter(s) IV Continuous <Continuous>  dextrose 5%. 1000 milliLiter(s) IV Continuous <Continuous>  dextrose 50% Injectable 25 Gram(s) IV Push once  dextrose 50% Injectable 12.5 Gram(s) IV Push once  dextrose 50% Injectable 25 Gram(s) IV Push once  dextrose Oral Gel 15 Gram(s) Oral once PRN  enalapril 10 milliGRAM(s) Oral daily  enoxaparin Injectable 40 milliGRAM(s) SubCutaneous every 24 hours  glucagon  Injectable 1 milliGRAM(s) IntraMuscular once  indomethacin Suppository 100 milliGRAM(s) Rectal once  insulin lispro (ADMELOG) corrective regimen sliding scale   SubCutaneous three times a day before meals  insulin lispro (ADMELOG) corrective regimen sliding scale   SubCutaneous at bedtime  lactated ringers. 1000 milliLiter(s) IV Continuous <Continuous>  piperacillin/tazobactam IVPB.. 3.375 Gram(s) IV Intermittent every 8 hours  potassium chloride  10 mEq/100 mL IVPB 10 milliEquivalent(s) IV Intermittent every 1 hour      PMHX/PSHX:  Back injury    H/O acute pancreatitis    Diabetes    No significant past surgical history        Family history:      ROS:     General:  No wt loss, fevers, chills, night sweats, fatigue,   Eyes:  Good vision, no reported pain  ENT:  No sore throat, pain, runny nose, dysphagia  CV:  No pain, palpitations, hypo/hypertension  Pulm:  No dyspnea, cough, tachypnea, wheezing  GI: see above  :  No pain, bleeding, incontinence, nocturia  Muscle:  No pain, weakness  Neuro:  No weakness, tingling, memory problems  Psych:  No fatigue, insomnia, mood problems, depression  Endocrine:  No polyuria, polydipsia, cold/heat intolerance  Heme:  No petechiae, ecchymosis, easy bruisability  Skin:  No rash, tattoos, scars, edema      PHYSICAL EXAM:   Vital Signs:  Vital Signs Last 24 Hrs  T(C): 36.8 (2023 05:08), Max: 37 (2023 20:00)  T(F): 98.2 (2023 05:08), Max: 98.6 (2023 20:00)  HR: 67 (2023 05:08) (61 - 78)  BP: 130/79 (2023 05:08) (121/76 - 186/95)  BP(mean): 70 (2023 15:16) (70 - 70)  RR: 18 (2023 05:08) (18 - 24)  SpO2: 97% (2023 05:08) (97% - 100%)    Parameters below as of 2023 05:08  Patient On (Oxygen Delivery Method): room air      Daily Height in cm: 177.8 (2023 15:16)    Daily     GENERAL:  No acute distress  HEENT:  Normocephalic/atraumtic,  no scleral icterus  CHEST:  Clear to auscultation bilaterally, no wheezes/rales/ronchi, no accessory muscle use  HEART:  Regular rate and rhythm, no murmurs/rubs/gallops  ABDOMEN:  Soft, non-tender, non-distended  EXTREMITIES:  No cyanosis, clubbing, or edema  SKIN:  No rash/erythema/ecchymoses/petechiae/wounds/abscess/warm/dry  NEURO:  Alert and oriented x 3, no asterixis, no tremor    LABS:                        13.3   9.54  )-----------(       ( 2023 05:35 )             39.5     Mean Cell Volume: 84.9 fl (23 @ 05:35)        139  |  104  |  20  ----------------------------<  157<H>  3.0<L>   |  24  |  1.26    Ca    7.6<L>      2023 05:35  Phos  2.2     -  Mg     1.7     -04    TPro  5.8<L>  /  Alb  3.2<L>  /  TBili  4.5<H>  /  DBili  x   /  AST  250<H>  /  ALT  471<H>  /  AlkPhos  78  -    LIVER FUNCTIONS - ( 2023 05:35 )  Alb: 3.2 g/dL / Pro: 5.8 g/dL / ALK PHOS: 78 U/L / ALT: 471 U/L / AST: 250 U/L / GGT: x           PT/INR - ( 2023 14:45 )   PT: 13.6 sec;   INR: 1.18 ratio         PTT - ( 2023 14:45 )  PTT:37.1 sec  Urinalysis Basic - ( 2023 06:37 )    Color: Dark Yellow / Appearance: Clear / S.047 / pH: x  Gluc: x / Ketone: Negative  / Bili: Moderate / Urobili: 4 mg/dL   Blood: x / Protein: Trace / Nitrite: Negative   Leuk Esterase: Negative / RBC: 2 /hpf / WBC 5 /HPF   Sq Epi: x / Non Sq Epi: 1 /hpf / Bacteria: Negative                              13.3   9.54  )-----------( 202      ( 2023 05:35 )             39.5                         14.4   14.07 )-----------( 229      ( 2023 22:10 )             41.4       Imaging:

## 2023-01-05 NOTE — PRE-ANESTHESIA EVALUATION ADULT - NSANTHDISPORD_GEN_ALL_CORE
PACU
Progress Note    Subjective:  No acute overnight events   Afebrile since admission  Patient states she feels good, no new complaints  Cr stable today      Review of Systems:  A comprehensive 13 point ROS was done and is as per HPI otherwise negative.     Home Medications:  Medications Prior to Admission   Medication Sig Dispense Refill   • vancomycin (VANCOCIN) 125 MG capsule Indications: Clostridium Difficile Infection Take as previously instructed through 9/25/22 20 capsule 0   • traMADol (ULTRAM) 50 MG tablet Take 50 mg by mouth every 6 hours as needed for Pain.     • oxyCODONE, IMM REL, (ROXICODONE) 5 MG immediate release tablet Take 5 mg by mouth as needed for Pain.     • allopurinol (ZYLOPRIM) 300 MG tablet Take 1 tablet by mouth daily. Do not start before August 23, 2022. (Patient taking differently: Take 300 mg by mouth daily after breakfast.) 30 tablet 0   • insulin glargine (LANTUS) 100 UNIT/ML vial solution Inject 25-35 Units into the skin at bedtime as needed. Uses only if she is steroids     • atorvastatin (LIPITOR) 40 MG tablet TAKE 1 TABLET BY MOUTH DAILY (Patient taking differently: Take 40 mg by mouth at bedtime.) 90 tablet 1   • metoPROLOL succinate (TOPROL-XL) 25 MG 24 hr tablet Take 1 tablet by mouth daily. Hold for 1-2 days, or longer if SBP < 120 (Patient taking differently: Take 25 mg by mouth daily (with lunch).) 90 tablet 1   • Insulin Syringe 29G X 1/2\" 0.3 ML Misc      • Insulin Pen Needle 31G X 5 MM Misc Use to inject insulin 1 times daily. Remove needle cover(s) to expose needle before injecting. 100 each 0   • sitaGLIPtin (JANUVIA) 100 MG tablet Take 0.5 tablets by mouth daily. (Patient taking differently: Take 50 mg by mouth every morning.) 30 tablet 3   • ferrous sulfate 324 (65 Fe) MG EC tablet TAKE 1 TABLET BY MOUTH THREE TIMES DAILY WITH MEALS (Patient taking differently: Take 324 mg by mouth daily (with lunch). Indications: Hold for 1 week after surgery or until cleared by  
PACU
[place].) 90 tablet 2   • True Metrix Blood Glucose Test test strip 1 strip 4 times daily. Test blood sugar 4 times daily. Diagnosis: E11.9 400 each 1   • TRUEplus Lancets 30G Misc Inject 1 each into the skin 4 times daily. 400 each 1   • levothyroxine 88 MCG tablet Take 1 tablet by mouth daily (before breakfast). 90 tablet 3   • Ascorbic Acid (VITAMIN C PO) Take 1 tablet by mouth daily after dinner.      • B Complex Vitamins (B COMPLEX PO) Take 1 tablet by mouth daily after dinner.      • Multiple Vitamin (MULTIVITAMIN PO) Take 1 tablet by mouth daily after dinner.      • Cholecalciferol (VITAMIN D3) 2000 units Tab Take 50 mcg by mouth daily after dinner.          Inpatient Medications:  Current Facility-Administered Medications   Medication Dose Route Frequency Provider Last Rate Last Admin   • sodium chloride 0.9% infusion   Intravenous Continuous PRN Hansa Hernandez       • potassium & sodium phosphates (PHOS-NAK) 280-160-250 MG powder packet 1 packet  1 packet Oral Q8H Lei Rowan MD   1 packet at 09/30/22 1152   • traMADol (ULTRAM) tablet 100 mg  100 mg Oral Q6H PRN Yessica Pulido MD   100 mg at 09/30/22 1159   • filgrastim (G-CSF) (NEUPOGEN) injection 480 mcg  480 mcg Subcutaneous Daily at noon Ashley aMgaña MD   480 mcg at 09/30/22 1151   • cefepime (MAXIPIME) 2,000 mg in sodium chloride 0.9 % 100 mL IVPB  2,000 mg Intravenous 2 times per day Silvana Singh MD 25 mL/hr at 09/30/22 0850 2,000 mg at 09/30/22 0850   • acetaminophen (TYLENOL) tablet 650 mg  650 mg Oral Q6H PRN Yessica Pulido MD   650 mg at 09/30/22 0943   • vancomycin (VANCOCIN) capsule 125 mg  125 mg Oral 2 times per day Silvana Singh MD   125 mg at 09/30/22 0857   • oxyCODONE (IMM REL) (ROXICODONE) tablet 5 mg  5 mg Oral Q6H PRN Dee Dee P Lynn   5 mg at 09/29/22 1835   • diphenhydrAMINE (BENADRYL) capsule 25 mg  25 mg Oral PRN oLry Mcdonald DO   25 mg at 09/30/22 0943   • fluconazole (DIFLUCAN) tablet 400 mg  400 mg Oral Daily iSlvana Singh 
MD   400 mg at 09/30/22 0858   • ondansetron (ZOFRAN) tablet 8 mg  8 mg Oral Q8H PRN Yessica Pulido MD   8 mg at 09/29/22 1151   • sodium chloride 0.9 % flush bag 25 mL  25 mL Intravenous PRN Dee Dee P Lynn       • sodium chloride (PF) 0.9 % injection 2 mL  2 mL Intracatheter 2 times per day Dee Dee P Lynn   2 mL at 09/30/22 0850   • atorvastatin (LIPITOR) tablet 40 mg  40 mg Oral QHS Dee Dee P Lynn   40 mg at 09/29/22 2038   • levothyroxine (SYNTHROID, LEVOTHROID) tablet 88 mcg  88 mcg Oral QAM AC Dee Dee P Lynn   88 mcg at 09/30/22 0516   • [Held by provider] traMADol (ULTRAM) tablet 50 mg  50 mg Oral Q6H PRN Dee Dee P Lynn   50 mg at 09/29/22 0512   • dextrose 50 % injection 25 g  25 g Intravenous PRN Dee Dee P Lynn       • dextrose 50 % injection 12.5 g  12.5 g Intravenous PRN Dee Dee P Lynn       • glucagon (GLUCAGEN) injection 1 mg  1 mg Intramuscular PRN Dee Dee P Lynn       • dextrose (GLUTOSE) 40 % gel 15 g  15 g Oral PRN Dee Dee P Lynn       • dextrose (GLUTOSE) 40 % gel 30 g  30 g Oral PRN Dee Dee P Lynn       • insulin lispro (ADMELOG,HumaLOG) - Correction Dose   Subcutaneous TID WC Dee Dee P Lynn   2 Units at 09/30/22 1358   • insulin lispro (ADMELOG,HumaLOG) - Correction Dose   Subcutaneous Nightly Dee Dee P Lynn       • ipratropium-albuterol (DUONEB) 0.5-2.5 (3) MG/3ML nebulizer solution 3 mL  3 mL Nebulization Q4H Resp PRN Navjot Jossue   3 mL at 09/22/22 2107       Objective:  Vital Last Value 24 Hour Range   Temperature 98 °F (36.7 °C) Temp  Min: 97.7 °F (36.5 °C)  Max: 98.1 °F (36.7 °C)   Pulse 82 Pulse  Min: 79  Max: 82   Respiratory 16 Resp  Min: 16  Max: 18   Blood Pressure 105/63 BP  Min: 102/60  Max: 112/69   Pulse Oximetry 96 % SpO2  Min: 96 %  Max: 99 %   CVP   No data recorded     Vital Today Admit   Weight 85.1 kg (187 lb 9.8 oz) Weight: 68.6 kg (151 lb 3.8 oz)   Height N/A Height: 5' 5\" (165.1 cm)   BMI N/A BMI (Calculated): 25.17       Intake/Output Summary (Last 24 hours) at 9/30/2022 1433  Last 
data filed at 9/30/2022 1300  Gross per 24 hour   Intake 1983.71 ml   Output 2250 ml   Net -266.29 ml      PHYSICAL EXAMINATION:    CON: alert, awake, appears stated age and well nourished  HEENT: normocephalic, EOMI, no icterus, MMM, L neck necrotic wound now dressed, previously seen black eschar has been removed and there is thick yellow drainage (minimal amount), no surrounding erythema, no tenderness   HEME/LYMPHATIC: no supraclavicular, cervical, or axillary adenopathy  LUNGS: CTAB, no increase in respiratory effort   HEART: RRR, no m/r/g  ABD: +BS, soft, improved distension, no hepatosplenomegaly  BACK: no tenderness to spine  EXTREMITIES: no deformities, no edema  SKIN: no rashes or lesions suggestive of malignancy  PSYCH: cooperative, normal judgment, affect congruent with mood      Laboratory Results:  Chemistry:  Recent Labs   Lab 09/30/22  0525 09/29/22  0455 09/28/22  1620 09/28/22  0212 09/27/22  0344 09/26/22  0348 09/25/22  0257 09/24/22  1343 09/24/22  0318   SODIUM 137 139 137 142 143 144 146*   < > 146*   POTASSIUM 3.5 3.3* 3.4 3.5 3.1* 3.4 3.4   < > 3.1*   CHLORIDE 109 109 107 114* 116* 118* 121*   < > 121*   CO2 22 23 22 21 20* 20* 17*   < > 18*   GLUCOSE 108* 137*  --  116* 103* 140* 90  --  100*   BUN 23* 27*  --  27* 25* 25* 24*  --  24*   CREATININE 1.19* 1.19*  --  1.25* 1.41* 1.39* 1.47*  --  1.45*   CALCIUM 8.9 9.3  --  9.1 8.5 8.6 8.6  --  8.1*   ALBUMIN 2.5* 2.5*  --  2.4* 2.2* 2.1* 2.0*  --  2.0*   MG 2.1 2.4 1.8 1.5* 1.5* 1.7 2.0  --  2.2   PHOS 2.2* 2.7 3.1 1.9* 2.5 2.2* 2.7  --  3.4   BILIRUBIN 0.9 0.8  --  1.1* 1.2* 1.1* 0.7  --  0.7   ALKPT 88 90  --  86 86 91 84  --  78   AST 8 10  --  12 16 23 28  --  28   GPT 15 15  --  17 15 20 19  --  15    < > = values in this interval not displayed.     CBC:  Recent Labs   Lab 09/30/22  0525 09/29/22  1118 09/29/22  0455 09/28/22  0212 09/27/22  0344 09/26/22  0348 09/25/22  0257 09/24/22  0318   WBC 0.1*  --  0.1* 0.1* 0.1* 0.2* 0.9* 2.6* 
  ASEG  --   --   --   --   --   --  0.8* 2.5   RBC 2.57*  --  1.65* 2.59* 2.20* 2.47* 2.38* 2.28*   HGB 7.8* 8.0* 5.0* 7.8* 6.7* 7.7* 7.4* 7.1*   HCT 22.4* 22.3* 14.3* 22.7* 19.7* 22.5* 22.0* 21.3*   PLT 4*  --  11* 16* 20* 33* 52* 59*     ABG:  No results found  Coags:  No results found  Acute Phase Reactants:  No results found    Microbiology:  Microbiology Results     None          Microbiology:  Microbiology Results     None          Radiology:  CT NECK SOFT TISSUE WO CONTRAST   Final Result       Partial resection of left parotid gland.  Otherwise, unremarkable CT soft   tissues of the neck.      Electronically Signed by: KELSEA ISLAS M.D.    Signed on: 9/29/2022 2:54 PM          CT CHEST ABDOMEN PELVIS WO CONTRAST   Final Result   1. Clear lungs    2. Negative abdomen and pelvis      Electronically Signed by: TERI MARTINO M.D.    Signed on: 9/29/2022 2:45 PM          US KIDNEY BILATERAL   Final Result   Normal ultrasound of the kidneys.  No hydronephrosis.      Electronically Signed by: TERI MARTINO M.D.    Signed on: 9/26/2022 3:34 PM          XR CHEST PA OR AP 1 VIEW   Final Result      No acute cardiopulmonary findings.         Electronically Signed by: JUSTINA GRIGGS M.D.    Signed on: 9/22/2022 11:08 PM          XR CHEST PA OR AP 1 VIEW   Final Result   1. There are no acute pulmonary abnormalities.      Electronically Signed by: MINAL JJ M.D.    Signed on: 9/22/2022 11:22 AM              Lines:  Available Intravenous Access     PICC Line / CVC Line / PIV Line / Intraosseous Line / Line / UAC Line  Duration           Implanted Ports Single Lumen 10/19/21 Right Power injectable 346 days    Peripheral IV 09/27/22 Right Forearm 20 3 days                 Assessment and Plan:  This is a 70 year old female with a history of diffuse large B cell Lymphoma (s/p 6 cycles of R-CHOP), insulin-dependent diabetes mellitus, hypothyroidism and hyperlipidemia who presented for fevers and chills, found to 
have E. Coli bacteremia.    #Sepsis 2/2 to E. Coli Bacteremia  -Blood cultures positive for E. Coli bacteremia-had pan sensitive E. Coli bacteremia on 8/30/22  -Unclear source-suspect urinary, port-a-cath tho patient's PAC site is non-erythematous with no access issues  -ID consulted-[place] recs  -Continue cefepime day 8 of 14  -Continue prophylactic enteral vancomycin  -Continue fluconazole while neutropenic   -CT neck, C/A/P with PO contrast- unremarkable     #Acute Kidney Injury  -Creatinine increased to 1.24 on 9/23 and peaked to 1.47 on 9/25-Cr 1.19 today 9/30/22  -Renal US was negative for hydronephrosis  -No recent iodinated contrast studies  -Remained non-oliguric  -Intake has been minimal  -Nephrology on consult, appreciate recs:   -Ok to discontinue IVF per nephro    #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 End of treatment PET-CT consistent with CR  -08/19/22 Cycle 1 R-ICE  -09/19/22 Cycle 2 R-ICE (Rituximab received on 9/16/22, ICE on 9/19/22). Neupogen started -on 9/22/22 -- to continue through [facility] recovery    #Hx of C. Diff infection  -Completed oral vancomycin through 09/25/22-will begin ppx dose per ID    #Hypogammaglobulinemia  -Can be seen with B-Cell lymphomas. Associated with worse overall prognosis   -Received IVIG 400mg/kg on 9/2/22 for IgG ~245 mg/dL      Chronic Problems:  #Normocytic anemia  #Hypertension  #Hyperlipidemia  #Hypothyroidism  #Insulin dependent diabetes mellitus  -Lipitor 40 mg tablet  -SSI inpatient  -Synthroid 88mcg    Please await attending's note or addendum for final 
recommendations.    Caitlyn Pulido MD  Internal Medicine, PGY-1  [employer] to reach

## 2023-01-05 NOTE — BRIEF OPERATIVE NOTE - NSICDXBRIEFPOSTOP_GEN_ALL_CORE_FT
POST-OP DIAGNOSIS:  Cholelithiasis with cholangitis 05-Jan-2023 16:08:18  Evy Traore  Chronic cholecystitis 05-Jan-2023 16:08:31  Evy Traore

## 2023-01-05 NOTE — BRIEF OPERATIVE NOTE - OPERATION/FINDINGS
Laparoscopic cholecystectomy. Gallbladder appear inflamed and edematous. Small liver laceration near falciform ligament. Pressure held with surgical and vistaseal applied. Small piece of surgicel left in laceration. 15Fr king drain left near gallbladder fossa. Hemostasis achieved at end of case.

## 2023-01-05 NOTE — CHART NOTE - NSCHARTNOTEFT_GEN_A_CORE
Surgery Post-Op Note    Procedure: Laparoscopic cholecystectomy    SUBJECTIVE:  Pt seen and examined at the bedside. Pt w/ no complaints. Denies F/C/N/V. Pain controlled with medication. Drain in place.     OBJECTIVE:  Vital Signs Last 24 Hrs  T(C): 37.1 (2023 18:51), Max: 37.1 (2023 18:51)  T(F): 98.7 (2023 18:51), Max: 98.7 (2023 18:51)  HR: 69 (2023 18:51) (57 - 78)  BP: 146/87 (2023 18:51) (114/65 - 161/98)  BP(mean): 101 (2023 18:00) (70 - 105)  RR: 18 (2023 18:51) (16 - 18)  SpO2: 95% (2023 18:51) (94% - 99%)    Parameters below as of 2023 18:51  Patient On (Oxygen Delivery Method): nasal cannula  O2 Flow (L/min): 2      Physical Exam:  General: NAD, resting comfortably in bed  Neuro: A/O x 3, no focal deficits  Pulmonary: Nonlabored breathing, no respiratory distress  Cardiovascular: NSR  Abdominal: soft, ATTP. ND  Incision: C/D/I   Drains: ASTER drain with SS fluid   Extremities: WWP    LABS:                        12.8   9.27  )-----------( 214      ( 2023 06:41 )             37.8     01-05    133<L>  |  97  |  19  ----------------------------<  208<H>  3.9   |  23  |  1.36<H>    Ca    8.9      2023 10:06  Phos  4.5     01-05  Mg     2.3     01-05    TPro  6.6  /  Alb  3.6  /  TBili  2.6<H>  /  DBili  x   /  AST  126<H>  /  ALT  357<H>  /  AlkPhos  94  01-05    PT/INR - ( 2023 08:49 )   PT: 12.7 sec;   INR: 1.09 ratio         PTT - ( 2023 08:49 )  PTT:36.1 sec  CAPILLARY BLOOD GLUCOSE      POCT Blood Glucose.: 203 mg/dL (2023 16:27)  POCT Blood Glucose.: 201 mg/dL (2023 09:10)  POCT Blood Glucose.: 271 mg/dL (2023 22:40)    Urinalysis Basic - ( 2023 06:37 )    Color: Dark Yellow / Appearance: Clear / S.047 / pH: x  Gluc: x / Ketone: Negative  / Bili: Moderate / Urobili: 4 mg/dL   Blood: x / Protein: Trace / Nitrite: Negative   Leuk Esterase: Negative / RBC: 2 /hpf / WBC 5 /HPF   Sq Epi: x / Non Sq Epi: 1 /hpf / Bacteria: Negative      LIVER FUNCTIONS - ( 2023 10:06 )  Alb: 3.6 g/dL / Pro: 6.6 g/dL / ALK PHOS: 94 U/L / ALT: 357 U/L / AST: 126 U/L / GGT: x           ABO Interpretation: A ( @ 14:41)      IMAGING:    ASSESSMENT:68y Male now 4 hours s/p laparoscopic cholecystectomy.     PLAN:  - Pain control  - Encourage IS  - Nausea control PRN  - Monitor vitals  - Diet: Regular   - Monitor I+Os  - OOB/ Ambulate  - DVT ppx  - F/u blood cultures     Red Team Surgery, [id number]

## 2023-01-05 NOTE — PRE-OP CHECKLIST - PATIENT'S PERSONAL PROPERTY REMOVED
wedding band sent back to [address] [place] [address] with Ancillary staff/[place] wedding band sent back to Barbara Segovia with BONITA Syed/audrey

## 2023-01-05 NOTE — PROGRESS NOTE ADULT - SUBJECTIVE AND OBJECTIVE BOX
Red Team Surgery Daily Progress Note    Subjective:   Patient seen at bedside this AM. Denies acute onset abdominal pain, N/V/D, fevers, chills, SOB, CP, lightheadedness    24h Events:   - Overnight, no acute events    Objective:  Vital Signs  T(C): 36.8 (01-05 @ 05:08), Max: 37 (01-04 @ 20:00)  HR: 67 (01-05 @ 05:08) (61 - 78)  BP: 130/79 (01-05 @ 05:08) (130/79 - 186/95)  RR: 18 (01-05 @ 05:08) (18 - 24)  SpO2: 97% (01-05 @ 05:08) (97% - 100%)      Physical Exam:  GEN: resting in bed comfortably in NAD  RESP: no increased WOB  ABD: soft, mildly distended, nontender   EXTR: warm, well-perfused without gross deformities; spontaneous movement in b/l U/L extrem  NEURO: AAOx4    Labs:                        13.3   9.54  )-----------( 202      ( 04 Jan 2023 05:35 )             39.5   01-04    139  |  104  |  20  ----------------------------<  157<H>  3.0<L>   |  24  |  1.26    Ca    7.6<L>      04 Jan 2023 05:35  Phos  2.2     01-04  Mg     1.7     01-04    TPro  5.8<L>  /  Alb  3.2<L>  /  TBili  4.5<H>  /  DBili  x   /  AST  250<H>  /  ALT  471<H>  /  AlkPhos  78  01-04    CAPILLARY BLOOD GLUCOSE      POCT Blood Glucose.: 271 mg/dL (04 Jan 2023 22:40)  POCT Blood Glucose.: 168 mg/dL (04 Jan 2023 15:24)  POCT Blood Glucose.: 180 mg/dL (04 Jan 2023 12:18)      Medications:   MEDICATIONS  (STANDING):  atorvastatin 10 milliGRAM(s) Oral at bedtime  dextrose 5%. 1000 milliLiter(s) (50 mL/Hr) IV Continuous <Continuous>  dextrose 5%. 1000 milliLiter(s) (100 mL/Hr) IV Continuous <Continuous>  dextrose 50% Injectable 25 Gram(s) IV Push once  dextrose 50% Injectable 12.5 Gram(s) IV Push once  dextrose 50% Injectable 25 Gram(s) IV Push once  enalapril 10 milliGRAM(s) Oral daily  enoxaparin Injectable 40 milliGRAM(s) SubCutaneous every 24 hours  glucagon  Injectable 1 milliGRAM(s) IntraMuscular once  indomethacin Suppository 100 milliGRAM(s) Rectal once  insulin lispro (ADMELOG) corrective regimen sliding scale   SubCutaneous three times a day before meals  insulin lispro (ADMELOG) corrective regimen sliding scale   SubCutaneous at bedtime  lactated ringers. 1000 milliLiter(s) (150 mL/Hr) IV Continuous <Continuous>  piperacillin/tazobactam IVPB.. 3.375 Gram(s) IV Intermittent every 8 hours  potassium chloride  10 mEq/100 mL IVPB 10 milliEquivalent(s) IV Intermittent every 1 hour    MEDICATIONS  (PRN):  dextrose Oral Gel 15 Gram(s) Oral once PRN Blood Glucose LESS THAN 70 milliGRAM(s)/deciliter      Imaging:

## 2023-01-06 ENCOUNTER — TRANSCRIPTION ENCOUNTER (OUTPATIENT)
Age: 68
End: 2023-01-06

## 2023-01-06 LAB
ALBUMIN SERPL ELPH-MCNC: 3.4 G/DL — SIGNIFICANT CHANGE UP (ref 3.3–5)
ALP SERPL-CCNC: 81 U/L — SIGNIFICANT CHANGE UP (ref 40–120)
ALT FLD-CCNC: 260 U/L — HIGH (ref 10–45)
ANION GAP SERPL CALC-SCNC: 10 MMOL/L — SIGNIFICANT CHANGE UP (ref 5–17)
AST SERPL-CCNC: 68 U/L — HIGH (ref 10–40)
BILIRUB DIRECT SERPL-MCNC: 0.7 MG/DL — HIGH (ref 0–0.3)
BILIRUB INDIRECT FLD-MCNC: 0.7 MG/DL — SIGNIFICANT CHANGE UP (ref 0.2–1)
BILIRUB SERPL-MCNC: 1.4 MG/DL — HIGH (ref 0.2–1.2)
BUN SERPL-MCNC: 24 MG/DL — HIGH (ref 7–23)
CALCIUM SERPL-MCNC: 8.1 MG/DL — LOW (ref 8.4–10.5)
CHLORIDE SERPL-SCNC: 99 MMOL/L — SIGNIFICANT CHANGE UP (ref 96–108)
CO2 SERPL-SCNC: 26 MMOL/L — SIGNIFICANT CHANGE UP (ref 22–31)
CREAT SERPL-MCNC: 1.52 MG/DL — HIGH (ref 0.5–1.3)
EGFR: 50 ML/MIN/1.73M2 — LOW
GLUCOSE BLDC GLUCOMTR-MCNC: 183 MG/DL — HIGH (ref 70–99)
GLUCOSE BLDC GLUCOMTR-MCNC: 226 MG/DL — HIGH (ref 70–99)
GLUCOSE BLDC GLUCOMTR-MCNC: 255 MG/DL — HIGH (ref 70–99)
GLUCOSE SERPL-MCNC: 246 MG/DL — HIGH (ref 70–99)
HCT VFR BLD CALC: 37.1 % — LOW (ref 39–50)
HGB BLD-MCNC: 12.6 G/DL — LOW (ref 13–17)
INR BLD: 1.03 RATIO — SIGNIFICANT CHANGE UP (ref 0.88–1.16)
MAGNESIUM SERPL-MCNC: 2.3 MG/DL — SIGNIFICANT CHANGE UP (ref 1.6–2.6)
MCHC RBC-ENTMCNC: 28.8 PG — SIGNIFICANT CHANGE UP (ref 27–34)
MCHC RBC-ENTMCNC: 34 GM/DL — SIGNIFICANT CHANGE UP (ref 32–36)
MCV RBC AUTO: 84.7 FL — SIGNIFICANT CHANGE UP (ref 80–100)
MELD SCORE WITH DIALYSIS: 22 POINTS — SIGNIFICANT CHANGE UP
MELD SCORE WITHOUT DIALYSIS: 14 POINTS — SIGNIFICANT CHANGE UP
NRBC # BLD: 0 /100 WBCS — SIGNIFICANT CHANGE UP (ref 0–0)
PHOSPHATE SERPL-MCNC: 2.4 MG/DL — LOW (ref 2.5–4.5)
PLATELET # BLD AUTO: 239 K/UL — SIGNIFICANT CHANGE UP (ref 150–400)
POTASSIUM SERPL-MCNC: 4 MMOL/L — SIGNIFICANT CHANGE UP (ref 3.5–5.3)
POTASSIUM SERPL-SCNC: 4 MMOL/L — SIGNIFICANT CHANGE UP (ref 3.5–5.3)
PROT SERPL-MCNC: 6.6 G/DL — SIGNIFICANT CHANGE UP (ref 6–8.3)
PROTHROM AB SERPL-ACNC: 11.9 SEC — SIGNIFICANT CHANGE UP (ref 10.5–13.4)
RBC # BLD: 4.38 M/UL — SIGNIFICANT CHANGE UP (ref 4.2–5.8)
RBC # FLD: 14.3 % — SIGNIFICANT CHANGE UP (ref 10.3–14.5)
SODIUM SERPL-SCNC: 135 MMOL/L — SIGNIFICANT CHANGE UP (ref 135–145)
WBC # BLD: 12.11 K/UL — HIGH (ref 3.8–10.5)
WBC # FLD AUTO: 12.11 K/UL — HIGH (ref 3.8–10.5)

## 2023-01-06 RX ORDER — OXYCODONE HYDROCHLORIDE 5 MG/1
1 TABLET ORAL
Qty: 12 | Refills: 0
Start: 2023-01-06

## 2023-01-06 RX ORDER — SODIUM,POTASSIUM PHOSPHATES 278-250MG
1 POWDER IN PACKET (EA) ORAL ONCE
Refills: 0 | Status: COMPLETED | OUTPATIENT
Start: 2023-01-06 | End: 2023-01-06

## 2023-01-06 RX ORDER — ACETAMINOPHEN 500 MG
2 TABLET ORAL
Qty: 0 | Refills: 0 | DISCHARGE

## 2023-01-06 RX ORDER — HYDRALAZINE HCL 50 MG
10 TABLET ORAL ONCE
Refills: 0 | Status: COMPLETED | OUTPATIENT
Start: 2023-01-06 | End: 2023-01-06

## 2023-01-06 RX ORDER — DEXTROSE MONOHYDRATE, SODIUM CHLORIDE, AND POTASSIUM CHLORIDE 50; .745; 4.5 G/1000ML; G/1000ML; G/1000ML
1000 INJECTION, SOLUTION INTRAVENOUS
Refills: 0 | Status: DISCONTINUED | OUTPATIENT
Start: 2023-01-06 | End: 2023-01-06

## 2023-01-06 RX ADMIN — PIPERACILLIN AND TAZOBACTAM 25 GRAM(S): 4; .5 INJECTION, POWDER, LYOPHILIZED, FOR SOLUTION INTRAVENOUS at 06:10

## 2023-01-06 RX ADMIN — PIPERACILLIN AND TAZOBACTAM 25 GRAM(S): 4; .5 INJECTION, POWDER, LYOPHILIZED, FOR SOLUTION INTRAVENOUS at 13:45

## 2023-01-06 RX ADMIN — Medication 650 MILLIGRAM(S): at 00:55

## 2023-01-06 RX ADMIN — OXYCODONE HYDROCHLORIDE 5 MILLIGRAM(S): 5 TABLET ORAL at 20:53

## 2023-01-06 RX ADMIN — Medication 650 MILLIGRAM(S): at 23:01

## 2023-01-06 RX ADMIN — OXYCODONE HYDROCHLORIDE 5 MILLIGRAM(S): 5 TABLET ORAL at 10:12

## 2023-01-06 RX ADMIN — DEXTROSE MONOHYDRATE, SODIUM CHLORIDE, AND POTASSIUM CHLORIDE 50 MILLILITER(S): 50; .745; 4.5 INJECTION, SOLUTION INTRAVENOUS at 12:21

## 2023-01-06 RX ADMIN — Medication 650 MILLIGRAM(S): at 23:58

## 2023-01-06 RX ADMIN — OXYCODONE HYDROCHLORIDE 5 MILLIGRAM(S): 5 TABLET ORAL at 17:41

## 2023-01-06 RX ADMIN — Medication 10 MILLIGRAM(S): at 23:01

## 2023-01-06 RX ADMIN — Medication 10 MILLIGRAM(S): at 06:12

## 2023-01-06 RX ADMIN — Medication 650 MILLIGRAM(S): at 13:31

## 2023-01-06 RX ADMIN — Medication 650 MILLIGRAM(S): at 18:58

## 2023-01-06 RX ADMIN — Medication 650 MILLIGRAM(S): at 17:35

## 2023-01-06 RX ADMIN — Medication 650 MILLIGRAM(S): at 07:17

## 2023-01-06 RX ADMIN — OXYCODONE HYDROCHLORIDE 5 MILLIGRAM(S): 5 TABLET ORAL at 06:12

## 2023-01-06 RX ADMIN — OXYCODONE HYDROCHLORIDE 5 MILLIGRAM(S): 5 TABLET ORAL at 16:41

## 2023-01-06 RX ADMIN — Medication 650 MILLIGRAM(S): at 01:00

## 2023-01-06 RX ADMIN — Medication 650 MILLIGRAM(S): at 06:10

## 2023-01-06 RX ADMIN — PIPERACILLIN AND TAZOBACTAM 25 GRAM(S): 4; .5 INJECTION, POWDER, LYOPHILIZED, FOR SOLUTION INTRAVENOUS at 20:53

## 2023-01-06 RX ADMIN — ATORVASTATIN CALCIUM 10 MILLIGRAM(S): 80 TABLET, FILM COATED ORAL at 20:53

## 2023-01-06 RX ADMIN — OXYCODONE HYDROCHLORIDE 5 MILLIGRAM(S): 5 TABLET ORAL at 22:19

## 2023-01-06 RX ADMIN — LIDOCAINE 1 PATCH: 4 CREAM TOPICAL at 15:27

## 2023-01-06 RX ADMIN — Medication 1 PACKET(S): at 09:45

## 2023-01-06 RX ADMIN — LIDOCAINE 1 PATCH: 4 CREAM TOPICAL at 19:38

## 2023-01-06 RX ADMIN — Medication 1: at 09:14

## 2023-01-06 RX ADMIN — OXYCODONE HYDROCHLORIDE 5 MILLIGRAM(S): 5 TABLET ORAL at 10:00

## 2023-01-06 RX ADMIN — OXYCODONE HYDROCHLORIDE 5 MILLIGRAM(S): 5 TABLET ORAL at 07:17

## 2023-01-06 RX ADMIN — Medication 3: at 17:36

## 2023-01-06 RX ADMIN — Medication 650 MILLIGRAM(S): at 12:20

## 2023-01-06 NOTE — DISCHARGE NOTE PROVIDER - HOSPITAL COURSE
Patient is a 68 year old male with PMHx significant for DM, HTN, and pancreatitis, presenting with nausea, vomiting, fevers, and chills of the past day. Symptoms started yesterday evening. Denies abdominal pain. Reports passing flatus and having BMs. Has a history of choledocholithiasis about 1 year ago for which he had an ERCP at that time; however, declined cholecystectomy at that time.  In ED, patient febrile and found to have elevated LFTs + elevated T. Bili. RUQ US in ED demonstrating gallstones.    Concerning for potential cholangitis. Admit to Red Surgery, Dr. Lopez. Urgent GI consult for ERCP, poss MRCP, NPO/IVF/ IV zosyn.  Patient underwent ERCP that same day, findings: EGD (1/4/23): LA Grade B esophagitis. Non-bleeding erosive gastropathy. Duodenal erosions without bleeding.  EUS (1/4/23): sludge and stone debris in a dilated common bile duct. Sludge in the gallbladder   ERCP (1/4/23): ascending cholangitis. Sludge, stones, and pus were completely removed from the dilated common bile duct with balloon sweeps.    Patient tolerated procedure well, sent to pacu then floor stable on clears. Added on for lapchole.  On 1/5, pt underwent lap carlene w ASTER drain placement. pt tolerated procedure well, was extubated and sent to pacu then floor stable. during hospitalization, Elevated Cr- outpt f/u PCP and hydration.  Post op- pt amanda diet and received ASTER teaching. At the time of discharge, the patient was hemodynamically stable, was tolerating PO diet, was voiding urine and passing stool, was ambulating, and was comfortable with adequate pain control. The patient was instructed to follow up with Dr. Lopez and PMD within 1-2 weeks after discharge from the hospital. The patient/family felt comfortable with discharge. The patient was discharged to home with po abx. The patient had no other issues. Patient is a 68 year old male with PMHx significant for DM, HTN, and pancreatitis, presenting with nausea, vomiting, fevers, and chills of the past day. Symptoms started yesterday evening. Denies abdominal pain. Reports passing flatus and having BMs. Has a history of choledocholithiasis about 1 year ago for which he had an ERCP at that time; however, declined cholecystectomy at that time.  In ED, patient febrile and found to have elevated LFTs + elevated T. Bili. RUQ US in ED demonstrating gallstones.    Concerning for potential cholangitis. Admit to Red Surgery, Dr. Lopez. Urgent GI consult for ERCP, poss MRCP, NPO/IVF/ IV zosyn.  Patient underwent ERCP that same day, findings: EGD (1/4/23): LA Grade B esophagitis. Non-bleeding erosive gastropathy. Duodenal erosions without bleeding.  EUS (1/4/23): sludge and stone debris in a dilated common bile duct. Sludge in the gallbladder   ERCP (1/4/23): ascending cholangitis. Sludge, stones, and pus were completely removed from the dilated common bile duct with balloon sweeps.    Patient tolerated procedure well, sent to pacu then floor stable on clears. Added on for lapchole.  On 1/5, pt underwent lap carlene w ASTER drain placement. pt tolerated procedure well, was extubated and sent to pacu then floor stable. during hospitalization, Elevated Cr- downtrended on admission. Post op- pt amanda diet. Patient had episode of afib with rvr for which   cardiology was consulted. He was placed on cardizem and metoprolol, received ECHO which, showed low normal LV function, mod-severe MR and transferred to [facility]. At the time of discharge, the patient was hemodynamically stable, was tolerating PO diet, was voiding urine and passing stool, was ambulating, and was comfortable with adequate pain control. ASTER removed. Per EP, will need to be sent home on Amiodarone 200qd and follow up with Dr. Justice as outpatient in 2 weeks. Structural cardiology also okay with patient having BERNARDO done as outpatient. May follow up with Dr. Olivo from EP in 6-8 weeks.  The patient was instructed to follow up with Dr. Lopez and PMD within 1-2 weeks after discharge from the hospital. The patient/family felt comfortable with discharge. The patient was discharged to home with po abx. The patient had no other issues.

## 2023-01-06 NOTE — PROGRESS NOTE ADULT - SUBJECTIVE AND OBJECTIVE BOX
SURGERY DAILY PROGRESS NOTE    STATUS POST:  Laparoscopic cholecystectomy    SUBJECTIVE: Pt seen and examined at bedside. Admits to incisional pain. Tolerated sandwich for dinner. Voiding. Denies chest pain, SOB, palpitations, HA, fever, chills, N/V.      OBJECTIVE:  Vital Signs Last 24 Hrs  T(C): 36.7 (06 Jan 2023 05:29), Max: 37.1 (05 Jan 2023 18:51)  T(F): 98.1 (06 Jan 2023 05:29), Max: 98.7 (05 Jan 2023 18:51)  HR: 67 (06 Jan 2023 05:29) (57 - 86)  BP: 136/77 (06 Jan 2023 05:29) (114/65 - 159/92)  BP(mean): 101 (05 Jan 2023 18:00) (70 - 105)  RR: 18 (06 Jan 2023 05:29) (16 - 18)  SpO2: 93% (06 Jan 2023 05:29) (93% - 99%)    Parameters below as of 06 Jan 2023 05:29  Patient On (Oxygen Delivery Method): room air        I&O's Summary    05 Jan 2023 07:01  -  06 Jan 2023 07:00  --------------------------------------------------------  IN: 0 mL / OUT: 640 mL / NET: -640 mL        Physical Exam:  General: NAD, resting comfortably in bed  Neuro: A/O x 3, no focal deficits  Pulmonary: Nonlabored breathing, no respiratory distress  Cardiovascular: NSR  Abdominal: soft, ATTP. ND, incisions c/d/i  Drains: ASTER drain with SS fluid   Extremities: WWP      LABS:                        12.6   12.11 )-----------( 239      ( 06 Jan 2023 06:43 )             37.1     01-06    135  |  99  |  24<H>  ----------------------------<  246<H>  4.0   |  26  |  1.52<H>    Ca    8.1<L>      06 Jan 2023 06:42  Phos  2.4     01-06  Mg     2.3     01-06    TPro  6.6  /  Alb  3.4  /  TBili  1.4<H>  /  DBili  0.7<H>  /  AST  68<H>  /  ALT  260<H>  /  AlkPhos  81  01-06    PT/INR - ( 06 Jan 2023 06:42 )   PT: 11.9 sec;   INR: 1.03 ratio         PTT - ( 05 Jan 2023 08:49 )  PTT:36.1 sec      RADIOLOGY & ADDITIONAL STUDIES:

## 2023-01-06 NOTE — DISCHARGE NOTE PROVIDER - CARE PROVIDERS DIRECT ADDRESSES
,DirectAddress_Unknown ,DirectAddress_Unknown,[email or web address]

## 2023-01-06 NOTE — DISCHARGE NOTE PROVIDER - NSDCMRMEDTOKEN_GEN_ALL_CORE_FT
acetaminophen 500 mg oral tablet: 2 tab(s) orally every 6 hours  alcohol swabs: 1 1 to 4 times a day  amoxicillin-clavulanate 875 mg-125 mg oral tablet: 1 tab(s) orally 2 times a day   atorvastatin 10 mg oral tablet: 1 tab(s) orally once a day (at bedtime)  chlorthalidone 25 mg oral tablet: 1 tab(s) orally once a day  enalapril 10 mg oral tablet: 1 tab(s) orally once a day  fenofibrate 43 mg oral capsule: 1 cap(s) orally once a day  glucometer:   HumaLOG KwikPen 100 units/mL subcutaneous solution: 6 unit(s) subcutaneous 3 times a day (before meals)  lancets:   Lantus Solostar Pen: 26 unit(s) subcutaneous once a day (at bedtime)  lidocaine 5% topical film: Apply topically to affected area once a day  metFORMIN 500 mg oral tablet, extended release: 1 tab(s) orally once a day  martha needles:   oxyCODONE 5 mg oral tablet: 1 tab(s) orally every 6 hours, As Needed -Severe Pain (7 - 10) MDD:4  test strips:    acetaminophen 500 mg oral tablet: 2 tab(s) orally every 6 hours  amiodarone 200 mg oral tablet: 1 tab(s) orally once a day   amoxicillin-clavulanate 875 mg-125 mg oral tablet: 1 tab(s) orally 2 times a day   apixaban 5 mg oral tablet: 1 tab(s) orally every 12 hours  atorvastatin 10 mg oral tablet: 1 tab(s) orally once a day (at bedtime)  enalapril 10 mg oral tablet: 10 milligram(s) orally once a day  fenofibrate 43 mg oral capsule: 1 cap(s) orally once a day  HumaLOG KwikPen 100 units/mL subcutaneous solution: 6 unit(s) subcutaneous 3 times a day (before meals)  metFORMIN 500 mg oral tablet, extended release: 1 tab(s) orally once a day  metoprolol tartrate 50 mg oral tablet: 1 tab(s) orally 2 times a day  metoprolol tartrate 50 mg oral tablet: 1 tab(s) orally 2 times a day  oxyCODONE 5 mg oral tablet: 1 tab(s) orally every 4 hours, As needed, Severe Pain (7 - 10)  oxyCODONE 5 mg oral tablet: 1 tab(s) orally every 6 hours, As Needed -Severe Pain (7 - 10) MDD:4   acetaminophen 500 mg oral tablet: 2 tab(s) orally every 6 hours  amiodarone 200 mg oral tablet: 1 tab(s) orally once a day   amoxicillin-clavulanate 875 mg-125 mg oral tablet: 1 tab(s) orally 2 times a day   apixaban 5 mg oral tablet: 1 tab(s) orally every 12 hours  atorvastatin 10 mg oral tablet: 1 tab(s) orally once a day (at bedtime)  enalapril 10 mg oral tablet: 10 milligram(s) orally once a day  fenofibrate 43 mg oral capsule: 1 cap(s) orally once a day  metFORMIN 500 mg oral tablet, extended release: 1 tab(s) orally once a day  metoprolol tartrate 50 mg oral tablet: 1 tab(s) orally 2 times a day  metoprolol tartrate 50 mg oral tablet: 1 tab(s) orally 2 times a day  oxyCODONE 5 mg oral tablet: 1 tab(s) orally every 4 hours, As needed, Severe Pain (7 - 10)  oxyCODONE 5 mg oral tablet: 1 tab(s) orally every 6 hours, As Needed -Severe Pain (7 - 10) MDD:4

## 2023-01-06 NOTE — DISCHARGE NOTE PROVIDER - PROVIDER TOKENS
PROVIDER:[TOKEN:[[id number]],FOLLOWUP:[1 week]] PROVIDER:[TOKEN:[[id number]],FOLLOWUP:[1 week]],PROVIDER:[TOKEN:[[id number]],FOLLOWUP:[2 months]]

## 2023-01-06 NOTE — DISCHARGE NOTE PROVIDER - CARE PROVIDER_API CALL
Martin Lopez (DO)  Surgery  [address] [facility] [address], [address]  [place] [address]  Phone: [phone]  Fax: [phone]  Follow Up Time: 1 week   Martin Lopez (DO)  Surgery  [address] [facility], [address]  [place] [address]  Phone: [phone]  Fax: [phone]  Follow Up Time: 1 week    Rajat Olivo)  Cardiac Electrophysiology; Cardiology  [address] [place] [address]  Phone: [phone]  Fax: [phone]  Follow Up Time: 2 months

## 2023-01-06 NOTE — DISCHARGE NOTE PROVIDER - NSDCCPCAREPLAN_GEN_ALL_CORE_FT
PRINCIPAL DISCHARGE DIAGNOSIS  Diagnosis: Cholecystitis  Assessment and Plan of Treatment: ANTIBIOTICS: Take oral Augmentin 875/125mg 2x daily for 4 more days.  WOUND CARE: Keep incisions clean/dry/intact. You will be discharged with ASTER drains. You will need to empty them and record outputs accurately. This will be taught to you by the nursing staff. Please do not remove the ASTER drains. They will be removed in the office. Please bring to the office accurate records of output.   BATHING: Please do not submerge wound underwater. You may shower and/or sponge bathe. Do not scrub incisions, let warm soapy water run down abdomen, pat dry.  PAIN: A prescription for oxycodone has been sent to the pharmacy. You should only take these for severe pain. For mild or moderate pain, you may take 975mg of tylenol every 6 hours. Do not exceed more than 4G per day. DO NOT TAKE ANY NSAIDS. Please take a stool softener (senna, colace, or miralax) while taking narcotic pain medication to avoid opioid-induced constipation.   ACTIVITY: No heavy lifting anything more than 10-15lbs or straining. Otherwise, you may return to your usual level of physical activity. If you are taking narcotic pain medication (such as Percocet), do NOT drive a car, operate machinery or make important decisions.  NOTIFY YOUR SURGEON IF: You have any bleeding that does not stop, any pus draining from your wound, any fever (over 100.4 F) or chills, persistent nausea/vomiting with inability to tolerate food or liquids, persistent diarrhea, or if your pain is not controlled on your discharge pain medications.  FOLLOW-UP:  1. Please call to make a follow-up appointment within one week of discharge with Dr. Lopez.  2. Please follow up with your primary care physician in one week regarding your hospitalization.      SECONDARY DISCHARGE DIAGNOSES  Diagnosis: Elevated serum creatinine  Assessment and Plan of Treatment: Elevated slightly on admission to 1.46, on discharge, 1.52, please follow up with your PMD next week for repeat bloodwork and further evaluation/management.    Diagnosis: Choledocholithiasis  Assessment and Plan of Treatment:      PRINCIPAL DISCHARGE DIAGNOSIS  Diagnosis: Cholecystitis  Assessment and Plan of Treatment: ANTIBIOTICS: Take oral Augmentin 875/125mg 2x daily for 4 more days.  WOUND CARE: Keep incisions clean/dry/intact. You will be discharged with ASTER drains. You will need to empty them and record outputs accurately. This will be taught to you by the nursing staff. Please do not remove the ASTER drains. They will be removed in the office. Please bring to the office accurate records of output.   BATHING: Please do not submerge wound underwater. You may shower and/or sponge bathe. Do not scrub incisions, let warm soapy water run down abdomen, pat dry.  PAIN: A prescription for oxycodone has been sent to the pharmacy. You should only take these for severe pain. For mild or moderate pain, you may take 975mg of tylenol every 6 hours. Do not exceed more than 4G per day. DO NOT TAKE ANY NSAIDS. Please take a stool softener (senna, colace, or miralax) while taking narcotic pain medication to avoid opioid-induced constipation.   ACTIVITY: No heavy lifting anything more than 10-15lbs or straining. Otherwise, you may return to your usual level of physical activity. If you are taking narcotic pain medication (such as Percocet), do NOT drive a car, operate machinery or make important decisions.  NOTIFY YOUR SURGEON IF: You have any bleeding that does not stop, any pus draining from your wound, any fever (over 100.4 F) or chills, persistent nausea/vomiting with inability to tolerate food or liquids, persistent diarrhea, or if your pain is not controlled on your discharge pain medications.  FOLLOW-UP:  1. Please call to make a follow-up appointment within one week of discharge with Dr. Lopez.  2. Please follow up with your primary care physician in one week regarding your hospitalization.      SECONDARY DISCHARGE DIAGNOSES  Diagnosis: Elevated serum creatinine  Assessment and Plan of Treatment: Elevated slightly on admission to 1.46, on discharge, 1.52, continue to hydrate, please follow up with your PMD next week for repeat bloodwork and further evaluation/management.    Diagnosis: Choledocholithiasis  Assessment and Plan of Treatment:      PRINCIPAL DISCHARGE DIAGNOSIS  Diagnosis: Cholecystitis  Assessment and Plan of Treatment: ANTIBIOTICS: Take oral Augmentin 875/125mg 2x daily for 7 more days.  WOUND CARE: Keep incisions clean/dry/intact. You will be discharged with ASTER drains. You will need to empty them and record outputs accurately. This will be taught to you by the nursing staff. Please do not remove the ASTER drains. They will be removed in the office. Please bring to the office accurate records of output.   BATHING: Please do not submerge wound underwater. You may shower and/or sponge bathe. Do not scrub incisions, let warm soapy water run down abdomen, pat dry.  PAIN: A prescription for oxycodone has been sent to the pharmacy. You should only take these for severe pain. For mild or moderate pain, you may take 975mg of tylenol every 6 hours. Do not exceed more than 4G per day. DO NOT TAKE ANY NSAIDS. Please take a stool softener (senna, colace, or miralax) while taking narcotic pain medication to avoid opioid-induced constipation.   ACTIVITY: No heavy lifting anything more than 10-15lbs or straining. Otherwise, you may return to your usual level of physical activity. If you are taking narcotic pain medication (such as Percocet), do NOT drive a car, operate machinery or make important decisions.  NOTIFY YOUR SURGEON IF: You have any bleeding that does not stop, any pus draining from your wound, any fever (over 100.4 F) or chills, persistent nausea/vomiting with inability to tolerate food or liquids, persistent diarrhea, or if your pain is not controlled on your discharge pain medications.  FOLLOW-UP:  1. Please call to make a follow-up appointment within one week of discharge with Dr. Lopez.  2. Please follow up with your primary care physician in one week regarding your hospitalization.      SECONDARY DISCHARGE DIAGNOSES  Diagnosis: Elevated serum creatinine  Assessment and Plan of Treatment: Elevated slightly on admission to 1.46, on discharge, 1.52, continue to hydrate, please follow up with your PMD next week for repeat bloodwork and further evaluation/management.    Diagnosis: Choledocholithiasis  Assessment and Plan of Treatment:      PRINCIPAL DISCHARGE DIAGNOSIS  Diagnosis: Cholecystitis  Assessment and Plan of Treatment: ANTIBIOTICS: Take oral Augmentin 875/125mg twice daily for 7 more days.  WOUND CARE: Keep incisions clean/dry/intact. You may shower.   BATHING: Please do not submerge wound underwater. You may shower and/or sponge bathe. Do not scrub incisions, let warm soapy water run down abdomen, pat dry.  PAIN: A prescription for oxycodone has been sent to the pharmacy. You should only take these for severe pain. For mild or moderate pain, you may take 975mg of tylenol every 6 hours. Do not exceed more than 4G per day. DO NOT TAKE ANY NSAIDS. Please take a stool softener (senna, colace, or miralax) while taking narcotic pain medication to avoid opioid-induced constipation.   ACTIVITY: No heavy lifting anything more than 10-15lbs or straining. Otherwise, you may return to your usual level of physical activity. If you are taking narcotic pain medication (such as Percocet), do NOT drive a car, operate machinery or make important decisions.  NOTIFY YOUR SURGEON IF: You have any bleeding that does not stop, any pus draining from your wound, any fever (over 100.4 F) or chills, persistent nausea/vomiting with inability to tolerate food or liquids, persistent diarrhea, or if your pain is not controlled on your discharge pain medications.  FOLLOW-UP:  1. Please call to make a follow-up appointment within one week of discharge with Dr. Lopez.  2. Please see your Cardiologist Dr. Justice in 1 week. Continue your home enalapril. You are prescribed eliquis 5mg twice a day, metoprolol 50mg twice a day, and amiodarone 200mg daily until you see your cardiologist. You will also need to arrange a transesophageal echocardiogram with Dr. Justice.   3. You may see the Electrophysiologist Dr. Olivo in 6-8 weeks   3. Please follow up with your primary care physician in one week regarding your hospitalization.        SECONDARY DISCHARGE DIAGNOSES  Diagnosis: Choledocholithiasis  Assessment and Plan of Treatment:     Diagnosis: Elevated serum creatinine  Assessment and Plan of Treatment: Elevated slightly on admission to 1.46, on discharge, 1.52, continue to hydrate, please follow up with your PMD next week for repeat bloodwork and further evaluation/management.

## 2023-01-06 NOTE — PROGRESS NOTE ADULT - ASSESSMENT
ASSESSMENT:68y Male s/p laparoscopic cholecystectomy 1/5/23.    PLAN:  - Pain control  - Encourage IS  - Nausea control PRN  - Monitor vitals  - Diet: Regular   - Monitor I+Os  - OOB/ Ambulate  - DVT ppx  - F/u blood cultures   - Dispo: likely dc home today    Red Team Surgery, [id number].

## 2023-01-07 LAB
-  AMPICILLIN: SIGNIFICANT CHANGE UP
-  GENTAMICIN SYNERGY: SIGNIFICANT CHANGE UP
-  STREPTOMYCIN SYNERGY: SIGNIFICANT CHANGE UP
-  VANCOMYCIN: SIGNIFICANT CHANGE UP
ALBUMIN SERPL ELPH-MCNC: 3.2 G/DL — LOW (ref 3.3–5)
ALBUMIN SERPL ELPH-MCNC: 3.7 G/DL — SIGNIFICANT CHANGE UP (ref 3.3–5)
ALP SERPL-CCNC: 88 U/L — SIGNIFICANT CHANGE UP (ref 40–120)
ALP SERPL-CCNC: 90 U/L — SIGNIFICANT CHANGE UP (ref 40–120)
ALT FLD-CCNC: 131 U/L — HIGH (ref 10–45)
ALT FLD-CCNC: 191 U/L — HIGH (ref 10–45)
ANION GAP SERPL CALC-SCNC: 15 MMOL/L — SIGNIFICANT CHANGE UP (ref 5–17)
ANION GAP SERPL CALC-SCNC: 15 MMOL/L — SIGNIFICANT CHANGE UP (ref 5–17)
AST SERPL-CCNC: 34 U/L — SIGNIFICANT CHANGE UP (ref 10–40)
AST SERPL-CCNC: 46 U/L — HIGH (ref 10–40)
BILIRUB DIRECT SERPL-MCNC: 0.6 MG/DL — HIGH (ref 0–0.3)
BILIRUB INDIRECT FLD-MCNC: 0.8 MG/DL — SIGNIFICANT CHANGE UP (ref 0.2–1)
BILIRUB SERPL-MCNC: 1.3 MG/DL — HIGH (ref 0.2–1.2)
BILIRUB SERPL-MCNC: 1.4 MG/DL — HIGH (ref 0.2–1.2)
BUN SERPL-MCNC: 19 MG/DL — SIGNIFICANT CHANGE UP (ref 7–23)
BUN SERPL-MCNC: 21 MG/DL — SIGNIFICANT CHANGE UP (ref 7–23)
CALCIUM SERPL-MCNC: 8.9 MG/DL — SIGNIFICANT CHANGE UP (ref 8.4–10.5)
CALCIUM SERPL-MCNC: 8.9 MG/DL — SIGNIFICANT CHANGE UP (ref 8.4–10.5)
CHLORIDE SERPL-SCNC: 101 MMOL/L — SIGNIFICANT CHANGE UP (ref 96–108)
CHLORIDE SERPL-SCNC: 98 MMOL/L — SIGNIFICANT CHANGE UP (ref 96–108)
CK MB CFR SERPL CALC: 1 NG/ML — SIGNIFICANT CHANGE UP (ref 0–6.7)
CO2 SERPL-SCNC: 20 MMOL/L — LOW (ref 22–31)
CO2 SERPL-SCNC: 22 MMOL/L — SIGNIFICANT CHANGE UP (ref 22–31)
CREAT SERPL-MCNC: 1.14 MG/DL — SIGNIFICANT CHANGE UP (ref 0.5–1.3)
CREAT SERPL-MCNC: 1.31 MG/DL — HIGH (ref 0.5–1.3)
CULTURE RESULTS: SIGNIFICANT CHANGE UP
EGFR: 60 ML/MIN/1.73M2 — SIGNIFICANT CHANGE UP
EGFR: 70 ML/MIN/1.73M2 — SIGNIFICANT CHANGE UP
GAS PNL BLDA: SIGNIFICANT CHANGE UP
GLUCOSE BLDC GLUCOMTR-MCNC: 152 MG/DL — HIGH (ref 70–99)
GLUCOSE BLDC GLUCOMTR-MCNC: 160 MG/DL — HIGH (ref 70–99)
GLUCOSE BLDC GLUCOMTR-MCNC: 163 MG/DL — HIGH (ref 70–99)
GLUCOSE BLDC GLUCOMTR-MCNC: 175 MG/DL — HIGH (ref 70–99)
GLUCOSE BLDC GLUCOMTR-MCNC: 176 MG/DL — HIGH (ref 70–99)
GLUCOSE SERPL-MCNC: 211 MG/DL — HIGH (ref 70–99)
GLUCOSE SERPL-MCNC: 216 MG/DL — HIGH (ref 70–99)
HCT VFR BLD CALC: 41.3 % — SIGNIFICANT CHANGE UP (ref 39–50)
HCT VFR BLD CALC: 42.6 % — SIGNIFICANT CHANGE UP (ref 39–50)
HGB BLD-MCNC: 14.2 G/DL — SIGNIFICANT CHANGE UP (ref 13–17)
HGB BLD-MCNC: 14.6 G/DL — SIGNIFICANT CHANGE UP (ref 13–17)
MAGNESIUM SERPL-MCNC: 2 MG/DL — SIGNIFICANT CHANGE UP (ref 1.6–2.6)
MAGNESIUM SERPL-MCNC: 2 MG/DL — SIGNIFICANT CHANGE UP (ref 1.6–2.6)
MCHC RBC-ENTMCNC: 28.9 PG — SIGNIFICANT CHANGE UP (ref 27–34)
MCHC RBC-ENTMCNC: 29.2 PG — SIGNIFICANT CHANGE UP (ref 27–34)
MCHC RBC-ENTMCNC: 34.3 GM/DL — SIGNIFICANT CHANGE UP (ref 32–36)
MCHC RBC-ENTMCNC: 34.4 GM/DL — SIGNIFICANT CHANGE UP (ref 32–36)
MCV RBC AUTO: 84.4 FL — SIGNIFICANT CHANGE UP (ref 80–100)
MCV RBC AUTO: 84.8 FL — SIGNIFICANT CHANGE UP (ref 80–100)
METHOD TYPE: SIGNIFICANT CHANGE UP
NRBC # BLD: 0 /100 WBCS — SIGNIFICANT CHANGE UP (ref 0–0)
NRBC # BLD: 0 /100 WBCS — SIGNIFICANT CHANGE UP (ref 0–0)
ORGANISM # SPEC MICROSCOPIC CNT: SIGNIFICANT CHANGE UP
PHOSPHATE SERPL-MCNC: 1.9 MG/DL — LOW (ref 2.5–4.5)
PHOSPHATE SERPL-MCNC: 3.2 MG/DL — SIGNIFICANT CHANGE UP (ref 2.5–4.5)
PLATELET # BLD AUTO: 258 K/UL — SIGNIFICANT CHANGE UP (ref 150–400)
PLATELET # BLD AUTO: 274 K/UL — SIGNIFICANT CHANGE UP (ref 150–400)
POTASSIUM SERPL-MCNC: 3.5 MMOL/L — SIGNIFICANT CHANGE UP (ref 3.5–5.3)
POTASSIUM SERPL-MCNC: 4.1 MMOL/L — SIGNIFICANT CHANGE UP (ref 3.5–5.3)
POTASSIUM SERPL-SCNC: 3.5 MMOL/L — SIGNIFICANT CHANGE UP (ref 3.5–5.3)
POTASSIUM SERPL-SCNC: 4.1 MMOL/L — SIGNIFICANT CHANGE UP (ref 3.5–5.3)
PROT SERPL-MCNC: 7.1 G/DL — SIGNIFICANT CHANGE UP (ref 6–8.3)
PROT SERPL-MCNC: 7.3 G/DL — SIGNIFICANT CHANGE UP (ref 6–8.3)
RBC # BLD: 4.87 M/UL — SIGNIFICANT CHANGE UP (ref 4.2–5.8)
RBC # BLD: 5.05 M/UL — SIGNIFICANT CHANGE UP (ref 4.2–5.8)
RBC # FLD: 14.2 % — SIGNIFICANT CHANGE UP (ref 10.3–14.5)
RBC # FLD: 14.3 % — SIGNIFICANT CHANGE UP (ref 10.3–14.5)
SODIUM SERPL-SCNC: 135 MMOL/L — SIGNIFICANT CHANGE UP (ref 135–145)
SODIUM SERPL-SCNC: 136 MMOL/L — SIGNIFICANT CHANGE UP (ref 135–145)
SPECIMEN SOURCE: SIGNIFICANT CHANGE UP
TROPONIN T, HIGH SENSITIVITY RESULT: 18 NG/L — SIGNIFICANT CHANGE UP (ref 0–51)
WBC # BLD: 16.43 K/UL — HIGH (ref 3.8–10.5)
WBC # BLD: 17.42 K/UL — HIGH (ref 3.8–10.5)
WBC # FLD AUTO: 16.43 K/UL — HIGH (ref 3.8–10.5)
WBC # FLD AUTO: 17.42 K/UL — HIGH (ref 3.8–10.5)

## 2023-01-07 PROCEDURE — 71275 CT ANGIOGRAPHY CHEST: CPT | Mod: 26

## 2023-01-07 PROCEDURE — 99223 1ST HOSP IP/OBS HIGH 75: CPT

## 2023-01-07 PROCEDURE — 71045 X-RAY EXAM CHEST 1 VIEW: CPT | Mod: 26

## 2023-01-07 PROCEDURE — 93010 ELECTROCARDIOGRAM REPORT: CPT

## 2023-01-07 RX ORDER — POTASSIUM PHOSPHATE, MONOBASIC POTASSIUM PHOSPHATE, DIBASIC 236; 224 MG/ML; MG/ML
30 INJECTION, SOLUTION INTRAVENOUS ONCE
Refills: 0 | Status: COMPLETED | OUTPATIENT
Start: 2023-01-07 | End: 2023-01-07

## 2023-01-07 RX ORDER — METOPROLOL TARTRATE 50 MG
5 TABLET ORAL ONCE
Refills: 0 | Status: COMPLETED | OUTPATIENT
Start: 2023-01-07 | End: 2023-01-07

## 2023-01-07 RX ORDER — ENOXAPARIN SODIUM 100 MG/ML
40 INJECTION SUBCUTANEOUS EVERY 24 HOURS
Refills: 0 | Status: DISCONTINUED | OUTPATIENT
Start: 2023-01-07 | End: 2023-01-08

## 2023-01-07 RX ORDER — DILTIAZEM HCL 120 MG
10 CAPSULE, EXT RELEASE 24 HR ORAL ONCE
Refills: 0 | Status: COMPLETED | OUTPATIENT
Start: 2023-01-07 | End: 2023-01-07

## 2023-01-07 RX ORDER — METOPROLOL TARTRATE 50 MG
25 TABLET ORAL
Refills: 0 | Status: DISCONTINUED | OUTPATIENT
Start: 2023-01-07 | End: 2023-01-07

## 2023-01-07 RX ORDER — LIDOCAINE 4 G/100G
1 CREAM TOPICAL EVERY 24 HOURS
Refills: 0 | Status: DISCONTINUED | OUTPATIENT
Start: 2023-01-07 | End: 2023-01-10

## 2023-01-07 RX ORDER — LIDOCAINE 4 G/100G
1 CREAM TOPICAL EVERY 24 HOURS
Refills: 0 | Status: DISCONTINUED | OUTPATIENT
Start: 2023-01-07 | End: 2023-01-07

## 2023-01-07 RX ORDER — METOPROLOL TARTRATE 50 MG
50 TABLET ORAL
Refills: 0 | Status: DISCONTINUED | OUTPATIENT
Start: 2023-01-07 | End: 2023-01-08

## 2023-01-07 RX ORDER — METOPROLOL TARTRATE 50 MG
5 TABLET ORAL ONCE
Refills: 0 | Status: DISCONTINUED | OUTPATIENT
Start: 2023-01-07 | End: 2023-01-07

## 2023-01-07 RX ORDER — SODIUM CHLORIDE 9 MG/ML
1000 INJECTION, SOLUTION INTRAVENOUS ONCE
Refills: 0 | Status: COMPLETED | OUTPATIENT
Start: 2023-01-07 | End: 2023-01-07

## 2023-01-07 RX ORDER — SODIUM,POTASSIUM PHOSPHATES 278-250MG
3 POWDER IN PACKET (EA) ORAL ONCE
Refills: 0 | Status: COMPLETED | OUTPATIENT
Start: 2023-01-07 | End: 2023-01-07

## 2023-01-07 RX ADMIN — Medication 1: at 17:55

## 2023-01-07 RX ADMIN — POTASSIUM PHOSPHATE, MONOBASIC POTASSIUM PHOSPHATE, DIBASIC 83.33 MILLIMOLE(S): 236; 224 INJECTION, SOLUTION INTRAVENOUS at 05:36

## 2023-01-07 RX ADMIN — PIPERACILLIN AND TAZOBACTAM 25 GRAM(S): 4; .5 INJECTION, POWDER, LYOPHILIZED, FOR SOLUTION INTRAVENOUS at 23:47

## 2023-01-07 RX ADMIN — LIDOCAINE 1 PATCH: 4 CREAM TOPICAL at 18:45

## 2023-01-07 RX ADMIN — LIDOCAINE 1 PATCH: 4 CREAM TOPICAL at 03:08

## 2023-01-07 RX ADMIN — Medication 10 MILLIGRAM(S): at 19:10

## 2023-01-07 RX ADMIN — PIPERACILLIN AND TAZOBACTAM 25 GRAM(S): 4; .5 INJECTION, POWDER, LYOPHILIZED, FOR SOLUTION INTRAVENOUS at 05:43

## 2023-01-07 RX ADMIN — Medication 650 MILLIGRAM(S): at 18:45

## 2023-01-07 RX ADMIN — Medication 650 MILLIGRAM(S): at 11:06

## 2023-01-07 RX ADMIN — OXYCODONE HYDROCHLORIDE 5 MILLIGRAM(S): 5 TABLET ORAL at 03:21

## 2023-01-07 RX ADMIN — Medication 5 MILLIGRAM(S): at 01:52

## 2023-01-07 RX ADMIN — Medication 1: at 08:35

## 2023-01-07 RX ADMIN — Medication 650 MILLIGRAM(S): at 23:48

## 2023-01-07 RX ADMIN — Medication 650 MILLIGRAM(S): at 12:15

## 2023-01-07 RX ADMIN — Medication 650 MILLIGRAM(S): at 06:30

## 2023-01-07 RX ADMIN — Medication 10 MILLIGRAM(S): at 05:43

## 2023-01-07 RX ADMIN — Medication 650 MILLIGRAM(S): at 05:45

## 2023-01-07 RX ADMIN — LIDOCAINE 1 PATCH: 4 CREAM TOPICAL at 16:06

## 2023-01-07 RX ADMIN — Medication 5 MILLIGRAM(S): at 01:15

## 2023-01-07 RX ADMIN — ENOXAPARIN SODIUM 40 MILLIGRAM(S): 100 INJECTION SUBCUTANEOUS at 10:21

## 2023-01-07 RX ADMIN — ATORVASTATIN CALCIUM 10 MILLIGRAM(S): 80 TABLET, FILM COATED ORAL at 22:52

## 2023-01-07 RX ADMIN — Medication 3 PACKET(S): at 10:20

## 2023-01-07 RX ADMIN — Medication 10 MILLIGRAM(S): at 02:48

## 2023-01-07 RX ADMIN — PIPERACILLIN AND TAZOBACTAM 25 GRAM(S): 4; .5 INJECTION, POWDER, LYOPHILIZED, FOR SOLUTION INTRAVENOUS at 16:06

## 2023-01-07 RX ADMIN — SODIUM CHLORIDE 1000 MILLILITER(S): 9 INJECTION, SOLUTION INTRAVENOUS at 10:26

## 2023-01-07 RX ADMIN — OXYCODONE HYDROCHLORIDE 5 MILLIGRAM(S): 5 TABLET ORAL at 04:00

## 2023-01-07 RX ADMIN — Medication 50 MILLIGRAM(S): at 17:44

## 2023-01-07 RX ADMIN — Medication 1: at 12:34

## 2023-01-07 RX ADMIN — Medication 650 MILLIGRAM(S): at 17:44

## 2023-01-07 RX ADMIN — Medication 25 MILLIGRAM(S): at 03:35

## 2023-01-07 NOTE — CHART NOTE - NSCHARTNOTEFT_GEN_A_CORE
Patient tachycardic to 130s around 12:00. Patient seen and examined by surgical team. At this time, patient is afebrile, , /92, 91% on RA. Patient denies chest pain, SOB, F/C/N/V. On physical exam, patient is resting comfortably. Pt is soft, non-tender, non-distended. ASTER drain with serosanginous output. Pt is s/p 1x dose of hyrdralazine for HTN to the 170s.     -Obtaining STAT EKG. Patient tachycardic to 130s around 12:00. Patient seen and examined by surgical team. At this time, patient is afebrile, , /92, 91% on RA. Pt endorsing palpitations. Patient denies chest pain, SOB, F/C/N/V. On physical exam, patient is resting comfortably. Pt is soft, non-tender, non-distended. ASTER drain with serosanginous output. Pt is s/p 1x dose of hyrdralazine for HTN to the 170s.     EKG performed which demonstrated AF w/ RVR    PLAN:  -5 mg Metoprolol   -STAT labs  -Transfer to telemetry floor   -Plan discussed with senior     Red Team Surgery, p9004 Patient tachycardic to 130s around 12:00. Patient seen and examined by surgical team. At this time, patient is afebrile, , /92, 91% on RA. Pt endorsing palpitations. Patient denies chest pain, SOB, F/C/N/V. On physical exam, patient is resting comfortably. Pt is soft, non-tender, non-distended. ASTER drain with serosanginous output. Pt is s/p 1x dose of hyrdralazine for HTN to the 170s.     EKG performed which demonstrated AF w/ RVR    PLAN:  -5 mg Metoprolol   -STAT labs  -Transfer to telemetry floor   -Echo ordered  -Cardiologist Toribio Justice [phone]  -Plan discussed with senior     Red Team Surgery, p9084 Patient tachycardic to 130s around 12:00. Patient seen and examined by surgical team. At this time, patient is afebrile, , /92, 91% on RA. Pt endorsing palpitations. Patient denies chest pain, SOB, F/C/N/V. On physical exam, patient is resting comfortably. Pt is soft, non-tender, non-distended. ASTER drain with serosanginous output. Pt is s/p 1x dose of hyrdralazine for HTN to the 170s.     EKG performed which demonstrated AF w/ RVR    PLAN:  -s/p 5 mg Metoprolol x2  -STAT labs, Troponin 18  -Transfer to telemetry floor   -Echo ordered  -House cardiology called   -Cardiologist Toribio Justice [phone]- [facility]  -Plan discussed with senior     Red Team Surgery, p9078 Patient tachycardic to 130s around 12:00. Patient seen and examined by surgical team. At this time, patient is afebrile, , /92, 91% on RA. Pt endorsing palpitations. Patient denies chest pain, SOB, F/C/N/V. On physical exam, patient is resting comfortably. Pt is soft, non-tender, non-distended. ASTER drain with serosanginous output. Pt is s/p 1x dose of hyrdralazine for HTN to the 170s.     EKG performed which demonstrated AF w/ RVR     PLAN:  -s/p 5 mg Metoprolol x2, 10 mg Cardizem, 25 mg Metoprolol PO   -STAT labs, Troponin 18, CKMB 1.0  -Transfer to telemetry floor   -House cardiology called- recommending Metoprolol 25 mg BID, Cardizem q 6 PRN (see consult note)   -Cardiologist Toribio Justice [phone]- [facility]  -Plan discussed with senior     Red Team Surgery, [id number]

## 2023-01-07 NOTE — PROGRESS NOTE ADULT - SUBJECTIVE AND OBJECTIVE BOX
24H hour events: Pt without complaint, no acute events overnight, Tele: AF with VHR currently 130's, range 110-130's and up to 160's    MEDICATIONS:  enalapril 10 milliGRAM(s) Oral daily  enoxaparin Injectable 40 milliGRAM(s) SubCutaneous every 24 hours  metoprolol tartrate 25 milliGRAM(s) Oral two times a day  piperacillin/tazobactam IVPB.. 3.375 Gram(s) IV Intermittent every 8 hours  acetaminophen     Tablet .. 650 milliGRAM(s) Oral every 6 hours  oxyCODONE    IR 5 milliGRAM(s) Oral every 4 hours PRN  oxyCODONE    IR 2.5 milliGRAM(s) Oral every 4 hours PRN  atorvastatin 10 milliGRAM(s) Oral at bedtime  dextrose 50% Injectable 25 Gram(s) IV Push once  dextrose 50% Injectable 12.5 Gram(s) IV Push once  dextrose 50% Injectable 25 Gram(s) IV Push once  dextrose Oral Gel 15 Gram(s) Oral once PRN  glucagon  Injectable 1 milliGRAM(s) IntraMuscular once  insulin lispro (ADMELOG) corrective regimen sliding scale   SubCutaneous at bedtime  insulin lispro (ADMELOG) corrective regimen sliding scale   SubCutaneous three times a day before meals    dextrose 5%. 1000 milliLiter(s) IV Continuous <Continuous>  dextrose 5%. 1000 milliLiter(s) IV Continuous <Continuous>  lidocaine   4% Patch 1 Patch Transdermal every 24 hours      REVIEW OF SYSTEMS:  Complete 12 point ROS negative.    PHYSICAL EXAM:  T(C): 36.9 (01-07-23 @ 11:44), Max: 37.7 (01-07-23 @ 00:20)  HR: 76 (01-07-23 @ 11:44) (76 - 150)  BP: 100/70 (01-07-23 @ 11:44) (100/70 - 174/97)  RR: 18 (01-07-23 @ 11:44) (16 - 19)  SpO2: 94% (01-07-23 @ 11:44) (88% - 95%)  Wt(kg): --  I&O's Summary    06 Jan 2023 07:01  -  07 Jan 2023 07:00  --------------------------------------------------------  IN: 450 mL / OUT: 2025 mL / NET: -1575 mL        Appearance: Normal	  HEENT: PERRL, EOMI	  Cardiovascular: Normal S1 S2, Irregular, No JVD, No murmurs  Respiratory: Lungs clear to auscultation	  Psychiatry: A & O x 3, Mood & affect appropriate  Gastrointestinal: Soft, Non-tender, + BS	  Skin: No rashes. R abdominal surgical incision site with drain in place, dressing C/D/I	  Neurologic: Grossly intact  Extremities: No clubbing, cyanosis or edema  Vascular: Peripheral pulses palpable 2+ bilaterally        LABS:	 	    CBC Full  -  ( 07 Jan 2023 01:22 )  WBC Count : 16.43 K/uL  Hemoglobin : 14.2 g/dL  Hematocrit : 41.3 %  Platelet Count - Automated : 258 K/uL  Mean Cell Volume : 84.8 fl  Mean Cell Hemoglobin : 29.2 pg  Mean Cell Hemoglobin Concentration : 34.4 gm/dL      01-07    135  |  98  |  21  ----------------------------<  216<H>  3.5   |  22  |  1.31<H>  01-06    135  |  99  |  24<H>  ----------------------------<  246<H>  4.0   |  26  |  1.52<H>    Ca    8.9      07 Jan 2023 01:22  Ca    8.1<L>      06 Jan 2023 06:42  Phos  1.9     01-07  Phos  2.4     01-06  Mg     2.0     01-07  Mg     2.3     01-06    TPro  7.3  /  Alb  3.7  /  TBili  1.4<H>  /  DBili  0.6<H>  /  AST  46<H>  /  ALT  191<H>  /  AlkPhos  90  01-07  TPro  6.6  /  Alb  3.4  /  TBili  1.4<H>  /  DBili  0.7<H>  /  AST  68<H>  /  ALT  260<H>  /  AlkPhos  81  01-06        TELEMETRY: AF with VHR currently 130's, range 110-130's and up to 160's

## 2023-01-07 NOTE — PATIENT PROFILE ADULT - FALL HARM RISK - HARM RISK INTERVENTIONS

## 2023-01-07 NOTE — CONSULT NOTE ADULT - ASSESSMENT
68 year old male with PMHx significant for DM, HTN, and pancreatitis, presenting with nausea, vomiting, fevers, and chills of the past day.     RVP (1/3) Negative  UCx (1/4) No Growth  BCx (1/3) Enterococcus  BCx (1/5) NGTD    CT A/P (1/3) No acute abdominopelvic abnormality.  RUQ US (1/4) Cholelithiasis with equivocal sonographic evidence of acute cholecystitis.  MRCP (1/4) Cholelithiasis without choledocholithiasis. Dilatation of the CBD measuring up to 1.1 cm.    On 1/4 s/p ERCP with evidence of Ascending cholangitis with Sludge, stones, and pus were completely removed from the dilated common bile duct with balloon sweeps.    On 1/5 s/p Laparoscopic cholecystectomy    Antibiotic Course:   Zosyn: 1/4 --> 68 year old male with PMHx significant for DM, HTN, and pancreatitis, presenting with nausea, vomiting, fevers, and chills of the past day.     RVP (1/3) Negative  UCx (1/4) No Growth  BCx (1/3) Enterococcus  BCx (1/5) NGTD    CT A/P (1/3) No acute abdominopelvic abnormality.  RUQ US (1/4) Cholelithiasis with equivocal sonographic evidence of acute cholecystitis.  MRCP (1/4) Cholelithiasis without choledocholithiasis. Dilatation of the CBD measuring up to 1.1 cm.    On 1/4 s/p ERCP with evidence of Ascending cholangitis with Sludge, stones, and pus were completely removed from the dilated common bile duct with balloon sweeps.    On 1/5 s/p Laparoscopic cholecystectomy    Antibiotic Course:   Zosyn: 1/4 -->    #Enterococcus Bacteremia, Cholangitis, Leukocytosis, Fever  --Recommend TTE  --Continue Zosyn (hope if continued defervescence, negative repeat cultures, unrevealing TTE to utilize PO for discharge)  --If continued WBC uptrend would repeat abdominal imaging  --Continue to follow CBC with diff  --Continue to follow renal function (Cr/BUN)  --Continue to follow transaminases  --Continue to follow temperature curve  --Follow up on preliminary blood cultures    I will continue to follow. Please feel free to contact me with any further questions.    Anish Enriquez M.D.  [facility] Division of Infectious Disease  8AM-5PM Monday - Friday: Available on [employer] Teams  After Hours and Holidays (or if no response on [employer] Teams): Please contact the Infectious Diseases Office at [phone]    The above assessment and plan were discussed with surgery team

## 2023-01-07 NOTE — CONSULT NOTE ADULT - SUBJECTIVE AND OBJECTIVE BOX
Patient is a 68y old  Male who presents with a chief complaint of Choledocholithiasis (07 Jan 2023 11:36)    HPI:    68 year old male with PMHx significant for DM, HTN, and pancreatitis, presenting with nausea, vomiting, fevers, and chills of the past day. Symptoms started yesterday evening. Denies abdominal pain. Reports passing flatus and having BMs. Has a history of choledocholithiasis about 1 year ago for which he had an ERCP at that time; however, declined cholecystectomy at that time.    In ED, patient febrile and found to have elevated LFTs + elevated T. Bili. RUQ US in ED demonstrating gallstones. (04 Jan 2023 05:08)     prior hospital charts reviewed [  ]  primary team notes reviewed [  ]  other consultant notes reviewed [  ]    PAST MEDICAL & SURGICAL HISTORY:  Back injury      H/O acute pancreatitis      Diabetes      No significant past surgical history          Allergies  No Known Allergies    ANTIMICROBIALS (past 90 days)  MEDICATIONS  (STANDING):  piperacillin/tazobactam IVPB..   25 mL/Hr IV Intermittent (01-07-23 @ 05:43)   25 mL/Hr IV Intermittent (01-06-23 @ 20:53)   25 mL/Hr IV Intermittent (01-06-23 @ 13:45)   25 mL/Hr IV Intermittent (01-06-23 @ 06:10)   25 mL/Hr IV Intermittent (01-05-23 @ 21:47)    piperacillin/tazobactam IVPB..   25 mL/Hr IV Intermittent (01-05-23 @ 05:09)   25 mL/Hr IV Intermittent (01-04-23 @ 22:52)   25 mL/Hr IV Intermittent (01-04-23 @ 15:50)   25 mL/Hr IV Intermittent (01-04-23 @ 05:21)    piperacillin/tazobactam IVPB...   200 mL/Hr IV Intermittent (01-04-23 @ 00:06)      ANTIMICROBIALS:    piperacillin/tazobactam IVPB.. 3.375 every 8 hours    OTHER MEDS: MEDICATIONS  (STANDING):  acetaminophen     Tablet .. 650 every 6 hours  atorvastatin 10 at bedtime  dextrose 50% Injectable 25 once  dextrose 50% Injectable 12.5 once  dextrose 50% Injectable 25 once  dextrose Oral Gel 15 once PRN  enalapril 10 daily  enoxaparin Injectable 40 every 24 hours  glucagon  Injectable 1 once  insulin lispro (ADMELOG) corrective regimen sliding scale  at bedtime  insulin lispro (ADMELOG) corrective regimen sliding scale  three times a day before meals  metoprolol tartrate 25 two times a day  oxyCODONE    IR 5 every 4 hours PRN  oxyCODONE    IR 2.5 every 4 hours PRN    SOCIAL HISTORY:   hx smoking  non-smoker    FAMILY HISTORY:    REVIEW OF SYSTEMS  [  ] ROS unobtainable because:    [  ] All other systems negative except as noted below:	    Constitutional:  [ ] fever [ ] chills  [ ] weight loss  [ ] weakness  Skin:  [ ] rash [ ] phlebitis	  Eyes: [ ] icterus [ ] pain  [ ] discharge	  ENMT: [ ] sore throat  [ ] thrush [ ] ulcers [ ] exudates  Respiratory: [ ] dyspnea [ ] hemoptysis [ ] cough [ ] sputum	  Cardiovascular:  [ ] chest pain [ ] palpitations [ ] edema	  Gastrointestinal:  [ ] nausea [ ] vomiting [ ] diarrhea [ ] constipation [ ] pain	  Genitourinary:  [ ] dysuria [ ] frequency [ ] hematuria [ ] discharge [ ] flank pain  [ ] incontinence  Musculoskeletal:  [ ] myalgias [ ] arthralgias [ ] arthritis  [ ] back pain  Neurological:  [ ] headache [ ] seizures  [ ] confusion/altered mental status  Psychiatric:  [ ] anxiety [ ] depression	  Hematology/Lymphatics:  [ ] lymphadenopathy  Endocrine:  [ ] adrenal [ ] thyroid  Allergic/Immunologic:	 [ ] transplant [ ] seasonal    Vital Signs Last 24 Hrs  T(F): 98.4 (01-07-23 @ 11:44), Max: 102.1 (01-03-23 @ 20:27)  Vital Signs Last 24 Hrs  HR: 76 (01-07-23 @ 11:44) (76 - 150)  BP: 100/70 (01-07-23 @ 11:44) (100/70 - 174/97)  RR: 18 (01-07-23 @ 11:44)  SpO2: 94% (01-07-23 @ 11:44) (88% - 95%)  Wt(kg): --    PHYSICAL EXAMINATION:  General: Alert and Awake, NAD  HEENT: PERRL, EOMI, No subconjunctival hemorrhages, Oropharynx Clear, MMM  Neck: Supple, No DARION  Cardiac: RRR, No M/R/G  Resp: CTAB, No Wh/Rh/Ra  Abdomen: NBS, NT/ND, No HSM, No rigidity or guarding  MSK: No LE edema. No stigmata of IE. No evidence of phlebitis. No evidence of synovitis.  : No tanner  Skin: No rashes or lesions. Skin is warm and dry to the touch.   Neuro: Alert and Awake. CN 2-12 Grossly intact. Moves all four extremities spontaneously.  Psych: Calm, Pleasant, Cooperative                          14.2   16.43 )-----------( 258      ( 07 Jan 2023 01:22 )             41.3     01-07    135  |  98  |  21  ----------------------------<  216<H>  3.5   |  22  |  1.31<H>    Ca    8.9      07 Jan 2023 01:22  Phos  1.9     01-07  Mg     2.0     01-07    TPro  7.3  /  Alb  3.7  /  TBili  1.4<H>  /  DBili  0.6<H>  /  AST  46<H>  /  ALT  191<H>  /  AlkPhos  90  01-07    MICROBIOLOGY:  Culture - Blood (collected 05 Jan 2023 17:30)  Source: .Blood Blood-Peripheral  Preliminary Report (06 Jan 2023 23:02):    No growth to date.    Culture - Blood (collected 05 Jan 2023 17:25)  Source: .Blood Blood-Peripheral  Preliminary Report (06 Jan 2023 23:02):    No growth to date.    Culture - Urine (collected 04 Jan 2023 06:37)  Source: Clean Catch Clean Catch (Midstream)  Final Report (05 Jan 2023 07:14):    No growth    Culture - Blood (collected 03 Jan 2023 22:02)  Source: .Blood Blood-Peripheral  Gram Stain (05 Jan 2023 16:41):    Growth in anaerobic bottle: Gram positive cocci in pairs  Final Report (07 Jan 2023 11:34):    Growth in anaerobic bottle: Enterococcus gallinarum    ***Blood Panel PCR results on this specimen are available    approximately 3 hours after the Gram stain result.***    Gram stain, PCR, and/or culture results may not always    correspond due to difference in methodologies.    ************************************************************    This PCR assay was performed by multiplex PCR. This    Assay tests for 66 bacterial and resistance gene targets.    Please refer to the [facility] test directory    at [email or web address] for details.  Organism: Blood Culture PCR  Enterococcus gallinarum (07 Jan 2023 11:34)  Organism: Enterococcus gallinarum (07 Jan 2023 11:34)      -  Ampicillin: S <=2 Predicts results to ampicillin/sulbactam, amoxacillin-clavulanate and  piperacillin-tazobactam.      -  Gentamicin synergy: S <=500      -  Streptomycin synergy: S <=1000      -  Vancomycin: R 4 E. casseliflavus/gallinarum have intrinsic, low level resistance to vancomycin. They are not considered to be VRE.      Method Type: CRISTHIAN  Organism: Blood Culture PCR (07 Jan 2023 11:34)      -  Enterococcus species: Detec      Method Type: PCR    Culture - Blood (collected 03 Jan 2023 21:45)  Source: .Blood Blood-Peripheral  Preliminary Report (05 Jan 2023 04:02):    No growth to date.              Rapid RVP Result: NotDetec (01-03 @ 22:13)        RADIOLOGY:    <The imaging below has been reviewed and visualized by me independently. Findings as detailed in report below>     Patient is a 68y old  Male who presents with a chief complaint of Choledocholithiasis (07 Jan 2023 11:36)    HPI:    68 year old male with PMHx significant for DM, HTN, and pancreatitis, presenting with nausea, vomiting, fevers, and chills of the past day. Symptoms started yesterday evening. Denies abdominal pain. Reports passing flatus and having BMs. Has a history of choledocholithiasis about 1 year ago for which he had an ERCP at that time; however, declined cholecystectomy at that time.    In ED, patient febrile and found to have elevated LFTs + elevated T. Bili. RUQ US in ED demonstrating gallstones. (04 Jan 2023 05:08)     prior hospital charts reviewed [  ]  primary team notes reviewed [ x ]  other consultant notes reviewed [ x ]    PAST MEDICAL & SURGICAL HISTORY:  Back injury      H/O acute pancreatitis      Diabetes      No significant past surgical history          Allergies  No Known Allergies    ANTIMICROBIALS (past 90 days)  MEDICATIONS  (STANDING):  piperacillin/tazobactam IVPB..   25 mL/Hr IV Intermittent (01-07-23 @ 05:43)   25 mL/Hr IV Intermittent (01-06-23 @ 20:53)   25 mL/Hr IV Intermittent (01-06-23 @ 13:45)   25 mL/Hr IV Intermittent (01-06-23 @ 06:10)   25 mL/Hr IV Intermittent (01-05-23 @ 21:47)    piperacillin/tazobactam IVPB..   25 mL/Hr IV Intermittent (01-05-23 @ 05:09)   25 mL/Hr IV Intermittent (01-04-23 @ 22:52)   25 mL/Hr IV Intermittent (01-04-23 @ 15:50)   25 mL/Hr IV Intermittent (01-04-23 @ 05:21)    piperacillin/tazobactam IVPB...   200 mL/Hr IV Intermittent (01-04-23 @ 00:06)      ANTIMICROBIALS:    piperacillin/tazobactam IVPB.. 3.375 every 8 hours    OTHER MEDS: MEDICATIONS  (STANDING):  acetaminophen     Tablet .. 650 every 6 hours  atorvastatin 10 at bedtime  dextrose 50% Injectable 25 once  dextrose 50% Injectable 12.5 once  dextrose 50% Injectable 25 once  dextrose Oral Gel 15 once PRN  enalapril 10 daily  enoxaparin Injectable 40 every 24 hours  glucagon  Injectable 1 once  insulin lispro (ADMELOG) corrective regimen sliding scale  at bedtime  insulin lispro (ADMELOG) corrective regimen sliding scale  three times a day before meals  metoprolol tartrate 25 two times a day  oxyCODONE    IR 5 every 4 hours PRN  oxyCODONE    IR 2.5 every 4 hours PRN    SOCIAL HISTORY: no smoking. social etoh use.     FAMILY HISTORY: noncontributory    REVIEW OF SYSTEMS  [  ] ROS unobtainable because:    [ x ] All other systems negative except as noted below:	    Constitutional:  [ ] fever [ ] chills  [ ] weight loss  [ ] weakness  Skin:  [ ] rash [ ] phlebitis	  Eyes: [ ] icterus [ ] pain  [ ] discharge	  ENMT: [ ] sore throat  [ ] thrush [ ] ulcers [ ] exudates  Respiratory: [ ] dyspnea [ ] hemoptysis [ ] cough [ ] sputum	  Cardiovascular:  [ ] chest pain [ ] palpitations [ ] edema	  Gastrointestinal:  [ ] nausea [ ] vomiting [ ] diarrhea [ ] constipation [ x] pain	  Genitourinary:  [ ] dysuria [ ] frequency [ ] hematuria [ ] discharge [ ] flank pain  [ ] incontinence  Musculoskeletal:  [ ] myalgias [ ] arthralgias [ ] arthritis  [ ] back pain  Neurological:  [ ] headache [ ] seizures  [ ] confusion/altered mental status  Psychiatric:  [ ] anxiety [ ] depression	  Hematology/Lymphatics:  [ ] lymphadenopathy  Endocrine:  [ ] adrenal [ ] thyroid  Allergic/Immunologic:	 [ ] transplant [ ] seasonal    Vital Signs Last 24 Hrs  T(F): 98.4 (01-07-23 @ 11:44), Max: 102.1 (01-03-23 @ 20:27)  Vital Signs Last 24 Hrs  HR: 76 (01-07-23 @ 11:44) (76 - 150)  BP: 100/70 (01-07-23 @ 11:44) (100/70 - 174/97)  RR: 18 (01-07-23 @ 11:44)  SpO2: 94% (01-07-23 @ 11:44) (88% - 95%)  Wt(kg): --    PHYSICAL EXAMINATION:  General: Alert and Awake, NAD  HEENT: PERRL, EOMI, No subconjunctival hemorrhages, Oropharynx Clear, MMM  Neck: Supple, No DARION  Cardiac: RRR, No M/R/G  Resp: CTAB, No Wh/Rh/Ra  Abdomen: NBS, Mild RUQ tenderness to palpation, ND  MSK: No LE edema. No stigmata of IE. No evidence of phlebitis. No evidence of synovitis.  : No tanner  Skin: No rashes or lesions. Skin is warm and dry to the touch.   Neuro: Alert and Awake. CN 2-12 Grossly intact. Moves all four extremities spontaneously.  Psych: Calm, Pleasant, Cooperative                          14.2   16.43 )-----------( 258      ( 07 Jan 2023 01:22 )             41.3     01-07    135  |  98  |  21  ----------------------------<  216<H>  3.5   |  22  |  1.31<H>    Ca    8.9      07 Jan 2023 01:22  Phos  1.9     01-07  Mg     2.0     01-07    TPro  7.3  /  Alb  3.7  /  TBili  1.4<H>  /  DBili  0.6<H>  /  AST  46<H>  /  ALT  191<H>  /  AlkPhos  90  01-07    MICROBIOLOGY:  Culture - Blood (collected 05 Jan 2023 17:30)  Source: .Blood Blood-Peripheral  Preliminary Report (06 Jan 2023 23:02):    No growth to date.    Culture - Blood (collected 05 Jan 2023 17:25)  Source: .Blood Blood-Peripheral  Preliminary Report (06 Jan 2023 23:02):    No growth to date.    Culture - Urine (collected 04 Jan 2023 06:37)  Source: Clean Catch Clean Catch (Midstream)  Final Report (05 Jan 2023 07:14):    No growth    Culture - Blood (collected 03 Jan 2023 22:02)  Source: .Blood Blood-Peripheral  Gram Stain (05 Jan 2023 16:41):    Growth in anaerobic bottle: Gram positive cocci in pairs  Final Report (07 Jan 2023 11:34):    Growth in anaerobic bottle: Enterococcus gallinarum    ***Blood Panel PCR results on this specimen are available    approximately 3 hours after the Gram stain result.***    Gram stain, PCR, and/or culture results may not always    correspond due to difference in methodologies.    ************************************************************    This PCR assay was performed by multiplex PCR. This    Assay tests for 66 bacterial and resistance gene targets.    Please refer to the [facility] test directory    at [email or web address] for details.  Organism: Blood Culture PCR  Enterococcus gallinarum (07 Jan 2023 11:34)  Organism: Enterococcus gallinarum (07 Jan 2023 11:34)      -  Ampicillin: S <=2 Predicts results to ampicillin/sulbactam, amoxacillin-clavulanate and  piperacillin-tazobactam.      -  Gentamicin synergy: S <=500      -  Streptomycin synergy: S <=1000      -  Vancomycin: R 4 E. casseliflavus/gallinarum have intrinsic, low level resistance to vancomycin. They are not considered to be VRE.      Method Type: CRISTHIAN  Organism: Blood Culture PCR (07 Jan 2023 11:34)      -  Enterococcus species: Detec      Method Type: PCR    Culture - Blood (collected 03 Jan 2023 21:45)  Source: .Blood Blood-Peripheral  Preliminary Report (05 Jan 2023 04:02):    No growth to date.              Rapid RVP Result: NotDetec (01-03 @ 22:13)        RADIOLOGY:    <The imaging below has been reviewed and visualized by me independently. Findings as detailed in report below>    < from: MR MRCP w/wo IV Cont (01.04.23 @ 22:26) >  IMPRESSION:  Cholelithiasis without choledocholithiasis.    Dilatation of the CBD measuring up to 1.1 cm.    Few pancreatic cysts measure less than 5 mm most consistent with side   branch IPMNs. Follow-up imaging in 12 months may be performed.    < end of copied text >

## 2023-01-07 NOTE — PROGRESS NOTE ADULT - ASSESSMENT
68y Male s/p laparoscopic cholecystectomy 1/5/23 c/b new onset Afib w/ RVR transferred to [facility] for telemetry monitoring.     PLAN:  - Appreciate EP recs; on Metoprolol BID  - Cardiac monitoring  - F/u ID recs for BCx 1/3   - Pain control; encourage Lidocaine patch  - Encourage IS  - Nausea control PRN  - Monitor vitals  - Diet: Regular   - Monitor I+Os  - OOB/ Ambulate  - DVT ppx    Red Team Surgery, [id number].

## 2023-01-07 NOTE — CONSULT NOTE ADULT - SUBJECTIVE AND OBJECTIVE BOX
EP FELLOW CONSULT NOTE    Consulting service: Surgery  Reason for consult: AFRVR    HPI:  Patient is a 68 year old male with PMHx significant for DM, HTN, and pancreatitis, presenting with nausea, vomiting, fevers, and chills of the past day. Symptoms started yesterday evening. Denies abdominal pain. Reports passing flatus and having BMs. Has a history of choledocholithiasis about 1 year ago for which he had an ERCP at that time; however, declined cholecystectomy at that time.    In ED, patient febrile and found to have elevated LFTs + elevated T. Bili. RUQ US in ED demonstrating gallstones. (04 Jan 2023 05:08)    Pt s/p lap carlene for cholecystitis 2 days ago. Found to be in AF RVR, cardiology consulted.    Patient reports that he developed acute onset palpitations, feeling extra/skipped beats tonight which he attributes to being dehydrated and hypoglycemic. He has had similar episodes intermittently in the past but usually not sustained like today. He did not have any CP dizziness SOB diaphoresis or syncope. He notes that he follows with a cardiologist Dr. Justice at [facility] for preventative purposes (RFs) but has no formal cardiac history and had a TTE last week which was presumably normal. Denies any hx of arrhythmia or CHARI. Main complaint at this time is post op pain.    EKG shows AF RVR, no e/o active ischemia     PMH:   Back injury  H/O acute pancreatitis  Diabetes    PSH:   No significant past surgical history    Allergies:  No Known Allergies      Home Meds:    Current Medications:   acetaminophen     Tablet .. 650 milliGRAM(s) Oral every 6 hours  atorvastatin 10 milliGRAM(s) Oral at bedtime  dextrose 5%. 1000 milliLiter(s) IV Continuous <Continuous>  dextrose 5%. 1000 milliLiter(s) IV Continuous <Continuous>  dextrose 50% Injectable 25 Gram(s) IV Push once  dextrose 50% Injectable 12.5 Gram(s) IV Push once  dextrose 50% Injectable 25 Gram(s) IV Push once  dextrose Oral Gel 15 Gram(s) Oral once PRN  enalapril 10 milliGRAM(s) Oral daily  glucagon  Injectable 1 milliGRAM(s) IntraMuscular once  insulin lispro (ADMELOG) corrective regimen sliding scale   SubCutaneous at bedtime  insulin lispro (ADMELOG) corrective regimen sliding scale   SubCutaneous three times a day before meals  lidocaine   4% Patch 1 Patch Transdermal every 24 hours  metoprolol tartrate 25 milliGRAM(s) Oral two times a day  oxyCODONE    IR 5 milliGRAM(s) Oral every 4 hours PRN  oxyCODONE    IR 2.5 milliGRAM(s) Oral every 4 hours PRN  piperacillin/tazobactam IVPB.. 3.375 Gram(s) IV Intermittent every 8 hours  potassium phosphate IVPB 30 milliMole(s) IV Intermittent once      REVIEW OF SYSTEMS:  All other review of systems is negative unless indicated above.    Physical Exam:  T(F): 99.7 (01-07), Max: 99.9 (01-07)  HR: 122 (01-07) (63 - 150)  BP: 124/74 (01-07) (119/86 - 174/97)  RR: 16 (01-07)  SpO2: 94% (01-07)    GENERAL: No acute distress, well-developed  HEAD:  Atraumatic, Normocephalic  ENT: EOMI, PERRLA, conjunctiva and sclera clear, Neck supple, No JVD, moist mucosa  CHEST/LUNG: Clear to auscultation bilaterally; No wheeze, equal breath sounds bilaterally   BACK: No spinal tenderness  HEART: Regular rate and rhythm; No murmurs, rubs, or gallops  ABDOMEN: Soft, Nontender, Nondistended; Bowel sounds present  EXTREMITIES:  No clubbing, cyanosis, or edema  PSYCH: Nl behavior, nl affect  NEUROLOGY: AAOx3, non-focal, cranial nerves intact  SKIN: Normal color, No rashes or lesions  LINES:    ECG: AF RVR    Labs: Personally reviewed                        14.2   16.43 )-----------( 258      ( 07 Jan 2023 01:22 )             41.3     01-07    135  |  98  |  21  ----------------------------<  216<H>  3.5   |  22  |  1.31<H>    Ca    8.9      07 Jan 2023 01:22  Phos  1.9     01-07  Mg     2.0     01-07    TPro  6.6  /  Alb  3.4  /  TBili  1.4<H>  /  DBili  0.7<H>  /  AST  68<H>  /  ALT  260<H>  /  AlkPhos  81  01-06    PT/INR - ( 06 Jan 2023 06:42 )   PT: 11.9 sec;   INR: 1.03 ratio      PTT - ( 05 Jan 2023 08:49 )  PTT:36.1 sec    CARDIAC MARKERS ( 07 Jan 2023 01:22 )  18 ng/L / x     / x     / x     / x     / 1.0 ng/mL  CARDIAC MARKERS ( 03 Jan 2023 22:10 )  38 ng/L / x     / x     / x     / x     / x          A/P  69 yo M with DM HTN Pancreatitis p/w cholecystitis, course c/b AF RVR for which cardiology was consulted    #AFib with RVR  - Pt with no known hx of AFib p/w palpitations perioperatively s/p CCY  - -150s, grossly asymptomatic aside from palpitations, HDS  - 2 days post lap CCY c/b liver lac, not on AC  - CHADSVASC 3 (age, DM, HTN)  - S/p 2 pushes of MTP 5 IV   Plan:  - Start Metoprolol Tartrate 25 PO BID for rate control   > Uptitrate as tolerated by BP for rate control    > Can try Diltizem 10 IV Q6 PRN if pt sustaining in RVR >140s or symptomatic   - Would benefit from AC due to CHADSVASC but needs to be cleared from surgical perspective  - No need to repeat TTE at this time as he had one last week, please try to obtain OSH records   - Should stay on BB and AC until seen by outpatient cardiologist to make decision on DCCV/Ablation/Medical therapy       Signed by:  Luis Harding PGY4  Cardiology Fellow    [facility]

## 2023-01-07 NOTE — PROGRESS NOTE ADULT - SUBJECTIVE AND OBJECTIVE BOX
Surgery Red Team Daily Progress Note   WECHSLER, JEFFREY | MRN-[id number]  --------------------------------------------------------------------------------------------------------------------  SUBJECTIVE / 24H EVENTS  Patient seen and examined on morning rounds. Pt new onset tachycardia w/ no associated symptoms w/u +Afib w/ RVR in 150s s/p IVP Metoprolol x2, Cardizem x1 and started on Metoprolol BID as per EP recs o/n. Pt transferred to [facility] for further telemetry monitoring. Pt otherwise notes RUQ pain that is amenable to ice pack.   --------------------------------------------------------------------------------------------------------------------  OBJECTIVE:    VITAL SIGNS:  T(C): 37.6 (01-07-23 @ 02:40), Max: 37.7 (01-07-23 @ 00:20)  HR: 116 (01-07-23 @ 05:35) (72 - 150)  BP: 135/85 (01-07-23 @ 05:35) (118/72 - 174/97)  RR: 16 (01-07-23 @ 05:35) (16 - 19)  SpO2: 95% (01-07-23 @ 05:35) (88% - 95%)  Daily     Daily   POCT Blood Glucose.: 176 mg/dL (01-07-23 @ 08:29)  POCT Blood Glucose.: 226 mg/dL (01-06-23 @ 21:40)  POCT Blood Glucose.: 255 mg/dL (01-06-23 @ 17:35)      PHYSICAL EXAM:  Gen: NAD  LS: Respirations unlabored.   Card: Tachycardia, Afib w/ RVR.   GI: Soft. RUQ tenderness. Nondistended.   Ext: Warm, well perfused      01-06-23 @ 07:01  -  01-07-23 @ 07:00  --------------------------------------------------------  IN:    Oral Fluid: 450 mL  Total IN: 450 mL    OUT:    Bulb (mL): 25 mL    Voided (mL): 2000 mL  Total OUT: 2025 mL    Total NET: -1575 mL          LAB VALUES:  01-07    135  |  98  |  21  ----------------------------<  216<H>  3.5   |  22  |  1.31<H>    Ca    8.9      07 Jan 2023 01:22  Phos  1.9     01-07  Mg     2.0     01-07    TPro  7.3  /  Alb  3.7  /  TBili  1.4<H>  /  DBili  0.6<H>  /  AST  46<H>  /  ALT  191<H>  /  AlkPhos  90  01-07                               14.2   16.43 )-----------( 258      ( 07 Jan 2023 01:22 )             41.3     LIVER FUNCTIONS - ( 07 Jan 2023 01:22 )  Alb: 3.7 g/dL / Pro: 7.3 g/dL / ALK PHOS: 90 U/L / ALT: 191 U/L / AST: 46 U/L / GGT: x           PT/INR - ( 06 Jan 2023 06:42 )   PT: 11.9 sec;   INR: 1.03 ratio             CARDIAC MARKERS ( 07 Jan 2023 01:22 )  x     / x     / x     / x     / 1.0 ng/mL          MICROBIOLOGY:    Culture - Blood (collected 05 Jan 2023 17:30)  Source: .Blood Blood-Peripheral  Preliminary Report (06 Jan 2023 23:02):    No growth to date.    Culture - Blood (collected 05 Jan 2023 17:25)  Source: .Blood Blood-Peripheral  Preliminary Report (06 Jan 2023 23:02):    No growth to date.      No new microbiology data for review.     RADIOLOGY:    No new radiographic images for review.    MEDICATIONS  (STANDING):  acetaminophen     Tablet .. 650 milliGRAM(s) Oral every 6 hours  atorvastatin 10 milliGRAM(s) Oral at bedtime  dextrose 5%. 1000 milliLiter(s) (100 mL/Hr) IV Continuous <Continuous>  dextrose 5%. 1000 milliLiter(s) (50 mL/Hr) IV Continuous <Continuous>  dextrose 50% Injectable 25 Gram(s) IV Push once  dextrose 50% Injectable 12.5 Gram(s) IV Push once  dextrose 50% Injectable 25 Gram(s) IV Push once  enalapril 10 milliGRAM(s) Oral daily  enoxaparin Injectable 40 milliGRAM(s) SubCutaneous every 24 hours  glucagon  Injectable 1 milliGRAM(s) IntraMuscular once  insulin lispro (ADMELOG) corrective regimen sliding scale   SubCutaneous at bedtime  insulin lispro (ADMELOG) corrective regimen sliding scale   SubCutaneous three times a day before meals  lidocaine   4% Patch 1 Patch Transdermal every 24 hours  metoprolol tartrate 25 milliGRAM(s) Oral two times a day  piperacillin/tazobactam IVPB.. 3.375 Gram(s) IV Intermittent every 8 hours    MEDICATIONS  (PRN):  dextrose Oral Gel 15 Gram(s) Oral once PRN Blood Glucose LESS THAN 70 milliGRAM(s)/deciliter  oxyCODONE    IR 5 milliGRAM(s) Oral every 4 hours PRN Severe Pain (7 - 10)  oxyCODONE    IR 2.5 milliGRAM(s) Oral every 4 hours PRN Moderate Pain (4 - 6)

## 2023-01-07 NOTE — PROGRESS NOTE ADULT - ASSESSMENT
67 y/o M with DM HTN Pancreatitis p/w cholecystitis, course c/b AF RVR for which EP was consulted. CHADSVASC 3 (age, DM, HTN)    #AFib with RVR in the setting of cholecystitis    Plan:  - He is currently not rate controlled, AF -130's, up to 160's  - Recommend increase Metoprolol Tartrate to 50mg PO BID and uptitrate as BP can tolerate for rate control   - Can try Diltizem 10 IV Q6 PRN if pt sustaining in RVR >140s or symptomatic   - Recommend starting DOAC with eliquis 5mg BID when feasible from surgical standpoint   - Pt had Echo done last week, please try to obtain OSH records. If unable to obtain records then would need repeat TTE   - Check TSH in am   - May consider BERNARDO/DCCV if remains in persistent AF but needs to be on AC.   - Discussed with Dr. Lopez at bedside.     CELESTINA Jaramillo NP-C  [phone]      69 y/o M with DM HTN Pancreatitis p/w cholecystitis, course c/b AF RVR for which EP was consulted. CHADSVASC 3 (age, DM, HTN)    #AFib with RVR in the setting of cholecystitis    Plan:  - He is currently not rate controlled, AF -130's, up to 160's  - Recommend increase Metoprolol Tartrate to 50mg PO BID and uptitrate as BP can tolerate for rate control   - Can try Diltizem 10 IV Q6 PRN if pt sustaining in RVR >140s or symptomatic   - Recommend starting DOAC with eliquis 5mg BID when feasible from surgical standpoint   - Pt had Echo done last week, please try to obtain OSH records. If unable to obtain records then would need repeat TTE   - Check TSH in am   - May consider BERNARDO/DCCV if remains in persistent AF but needs to be on AC.   - Discussed with primary team.    CELESTINA Jaramillo NP-C  [phone]

## 2023-01-08 LAB
ALBUMIN SERPL ELPH-MCNC: 3.1 G/DL — LOW (ref 3.3–5)
ALP SERPL-CCNC: 85 U/L — SIGNIFICANT CHANGE UP (ref 40–120)
ALT FLD-CCNC: 110 U/L — HIGH (ref 10–45)
ANION GAP SERPL CALC-SCNC: 14 MMOL/L — SIGNIFICANT CHANGE UP (ref 5–17)
AST SERPL-CCNC: 25 U/L — SIGNIFICANT CHANGE UP (ref 10–40)
BILIRUB SERPL-MCNC: 1.2 MG/DL — SIGNIFICANT CHANGE UP (ref 0.2–1.2)
BUN SERPL-MCNC: 19 MG/DL — SIGNIFICANT CHANGE UP (ref 7–23)
CALCIUM SERPL-MCNC: 9.1 MG/DL — SIGNIFICANT CHANGE UP (ref 8.4–10.5)
CHLORIDE SERPL-SCNC: 99 MMOL/L — SIGNIFICANT CHANGE UP (ref 96–108)
CO2 SERPL-SCNC: 23 MMOL/L — SIGNIFICANT CHANGE UP (ref 22–31)
CREAT SERPL-MCNC: 1.17 MG/DL — SIGNIFICANT CHANGE UP (ref 0.5–1.3)
EGFR: 68 ML/MIN/1.73M2 — SIGNIFICANT CHANGE UP
GLUCOSE BLDC GLUCOMTR-MCNC: 129 MG/DL — HIGH (ref 70–99)
GLUCOSE BLDC GLUCOMTR-MCNC: 169 MG/DL — HIGH (ref 70–99)
GLUCOSE BLDC GLUCOMTR-MCNC: 178 MG/DL — HIGH (ref 70–99)
GLUCOSE BLDC GLUCOMTR-MCNC: 185 MG/DL — HIGH (ref 70–99)
GLUCOSE SERPL-MCNC: 153 MG/DL — HIGH (ref 70–99)
HCT VFR BLD CALC: 43.2 % — SIGNIFICANT CHANGE UP (ref 39–50)
HGB BLD-MCNC: 14.4 G/DL — SIGNIFICANT CHANGE UP (ref 13–17)
MAGNESIUM SERPL-MCNC: 2 MG/DL — SIGNIFICANT CHANGE UP (ref 1.6–2.6)
MCHC RBC-ENTMCNC: 28.5 PG — SIGNIFICANT CHANGE UP (ref 27–34)
MCHC RBC-ENTMCNC: 33.3 GM/DL — SIGNIFICANT CHANGE UP (ref 32–36)
MCV RBC AUTO: 85.5 FL — SIGNIFICANT CHANGE UP (ref 80–100)
NRBC # BLD: 0 /100 WBCS — SIGNIFICANT CHANGE UP (ref 0–0)
PHOSPHATE SERPL-MCNC: 2.9 MG/DL — SIGNIFICANT CHANGE UP (ref 2.5–4.5)
PLATELET # BLD AUTO: 289 K/UL — SIGNIFICANT CHANGE UP (ref 150–400)
POTASSIUM SERPL-MCNC: 3.8 MMOL/L — SIGNIFICANT CHANGE UP (ref 3.5–5.3)
POTASSIUM SERPL-SCNC: 3.8 MMOL/L — SIGNIFICANT CHANGE UP (ref 3.5–5.3)
PROT SERPL-MCNC: 6.9 G/DL — SIGNIFICANT CHANGE UP (ref 6–8.3)
RBC # BLD: 5.05 M/UL — SIGNIFICANT CHANGE UP (ref 4.2–5.8)
RBC # FLD: 14.3 % — SIGNIFICANT CHANGE UP (ref 10.3–14.5)
SODIUM SERPL-SCNC: 136 MMOL/L — SIGNIFICANT CHANGE UP (ref 135–145)
TSH SERPL-MCNC: 4.15 UIU/ML — SIGNIFICANT CHANGE UP (ref 0.27–4.2)
WBC # BLD: 15.84 K/UL — HIGH (ref 3.8–10.5)
WBC # FLD AUTO: 15.84 K/UL — HIGH (ref 3.8–10.5)

## 2023-01-08 PROCEDURE — 93010 ELECTROCARDIOGRAM REPORT: CPT

## 2023-01-08 PROCEDURE — 99232 SBSQ HOSP IP/OBS MODERATE 35: CPT

## 2023-01-08 RX ORDER — AMIODARONE HYDROCHLORIDE 400 MG/1
400 TABLET ORAL
Refills: 0 | Status: DISCONTINUED | OUTPATIENT
Start: 2023-01-08 | End: 2023-01-10

## 2023-01-08 RX ORDER — METOPROLOL TARTRATE 50 MG
50 TABLET ORAL
Refills: 0 | Status: DISCONTINUED | OUTPATIENT
Start: 2023-01-08 | End: 2023-01-10

## 2023-01-08 RX ORDER — DILTIAZEM HCL 120 MG
5 CAPSULE, EXT RELEASE 24 HR ORAL
Qty: 125 | Refills: 0 | Status: DISCONTINUED | OUTPATIENT
Start: 2023-01-08 | End: 2023-01-08

## 2023-01-08 RX ORDER — APIXABAN 2.5 MG/1
5 TABLET, FILM COATED ORAL EVERY 12 HOURS
Refills: 0 | Status: DISCONTINUED | OUTPATIENT
Start: 2023-01-08 | End: 2023-01-10

## 2023-01-08 RX ORDER — DILTIAZEM HCL 120 MG
10 CAPSULE, EXT RELEASE 24 HR ORAL ONCE
Refills: 0 | Status: COMPLETED | OUTPATIENT
Start: 2023-01-08 | End: 2023-01-08

## 2023-01-08 RX ORDER — SODIUM,POTASSIUM PHOSPHATES 278-250MG
1 POWDER IN PACKET (EA) ORAL ONCE
Refills: 0 | Status: COMPLETED | OUTPATIENT
Start: 2023-01-08 | End: 2023-01-08

## 2023-01-08 RX ADMIN — PIPERACILLIN AND TAZOBACTAM 25 GRAM(S): 4; .5 INJECTION, POWDER, LYOPHILIZED, FOR SOLUTION INTRAVENOUS at 14:34

## 2023-01-08 RX ADMIN — PIPERACILLIN AND TAZOBACTAM 25 GRAM(S): 4; .5 INJECTION, POWDER, LYOPHILIZED, FOR SOLUTION INTRAVENOUS at 06:23

## 2023-01-08 RX ADMIN — Medication 650 MILLIGRAM(S): at 11:11

## 2023-01-08 RX ADMIN — Medication 1: at 08:56

## 2023-01-08 RX ADMIN — PIPERACILLIN AND TAZOBACTAM 25 GRAM(S): 4; .5 INJECTION, POWDER, LYOPHILIZED, FOR SOLUTION INTRAVENOUS at 23:04

## 2023-01-08 RX ADMIN — Medication 650 MILLIGRAM(S): at 23:06

## 2023-01-08 RX ADMIN — Medication 50 MILLIGRAM(S): at 06:23

## 2023-01-08 RX ADMIN — Medication 650 MILLIGRAM(S): at 00:18

## 2023-01-08 RX ADMIN — Medication 50 MILLIGRAM(S): at 17:06

## 2023-01-08 RX ADMIN — ATORVASTATIN CALCIUM 10 MILLIGRAM(S): 80 TABLET, FILM COATED ORAL at 23:04

## 2023-01-08 RX ADMIN — LIDOCAINE 1 PATCH: 4 CREAM TOPICAL at 06:00

## 2023-01-08 RX ADMIN — Medication 1 PACKET(S): at 12:42

## 2023-01-08 RX ADMIN — Medication 10 MILLIGRAM(S): at 09:34

## 2023-01-08 RX ADMIN — Medication 1: at 12:39

## 2023-01-08 RX ADMIN — AMIODARONE HYDROCHLORIDE 400 MILLIGRAM(S): 400 TABLET ORAL at 17:05

## 2023-01-08 RX ADMIN — Medication 10 MILLIGRAM(S): at 06:23

## 2023-01-08 RX ADMIN — Medication 650 MILLIGRAM(S): at 23:30

## 2023-01-08 RX ADMIN — Medication 650 MILLIGRAM(S): at 06:22

## 2023-01-08 RX ADMIN — ENOXAPARIN SODIUM 40 MILLIGRAM(S): 100 INJECTION SUBCUTANEOUS at 09:35

## 2023-01-08 RX ADMIN — Medication 5 MG/HR: at 11:11

## 2023-01-08 RX ADMIN — Medication 650 MILLIGRAM(S): at 06:47

## 2023-01-08 RX ADMIN — Medication 650 MILLIGRAM(S): at 12:37

## 2023-01-08 RX ADMIN — Medication 650 MILLIGRAM(S): at 18:15

## 2023-01-08 RX ADMIN — APIXABAN 5 MILLIGRAM(S): 2.5 TABLET, FILM COATED ORAL at 17:06

## 2023-01-08 RX ADMIN — Medication 650 MILLIGRAM(S): at 17:06

## 2023-01-08 NOTE — PROVIDER CONTACT NOTE (OTHER) - ASSESSMENT
Asymptomatic VSS
T= 98.4, HR 78, /90 RR 18 92% room air, asymptomatic, c/o only incisional pain & medicated PRN
Asymptomatic. VSS.
T 99.9  & irregular /92 RR 18 91% O2 on room air. c/o irregular HR & palpitations & being dehydrated.
asymptomatic. Clearing throat 5-10 times a min. States he has mucus and it hurts to cough. Offered pain medication.

## 2023-01-08 NOTE — PROVIDER CONTACT NOTE (OTHER) - ACTION/TREATMENT ORDERED:
ekg, labs, telemetry, place pt on zolle, iv lopressor given x2, 2L NC, transfer to [facility]
IV Hydralazine 10 mg ordered & given, will continue to monitor
Call only if HR sustains for more than 30 min at 150's or greater.
Pending return page.
SX verifying with Telemetry duration of increased HR. Pending orders.

## 2023-01-08 NOTE — CONSULT NOTE ADULT - ASSESSMENT
Cholelithiasis/ Cholangitis  - S/P OR for lap choley  - C/w IV ABX per ID, currently on Zosyn  - Currently on regular diet, monitor for tolerance   - Pain control  - Monitor CBC, Temp curve, VS and patient closely     Bacteremia   - BC 1/ 2 01/03 with Enterococcus  - F/u repeat BCx2  - F/u Echo to R/O vegetations  - Currently on ZOsyn per ID  - Monitor patient, VS, CBC and temp curve closely    New Onset Afib   - Care per EP appreciated  - Currently on Metoprolol 50 BID for rate control  - CHADSVASC score of 3, consider full dose AC if cleared from surgery standpoint  - F/u ECHO   - CTA PE protocol - Pre-Carbone neg for PE, F/u final   - Check TSH  - Monitor on Tele   - Monitor electrolytes closely to maintain K > 4 and Mg > 2.0     DM  - A1C of 7.3  - C/w CC diet  - C/w sliding scale   - Monitor glucose levels closely; Avoid hypo/hyperglycemia     HLD  - C/w Lipitor 10 home dose  - Check lipid panel     Full consult note to follow-- INCOMPLETE NOTE   Patient is a 68 year old male with a PMHx of DM, HTN, pancreatitis, who presented to [facility] with a chief complaint of nausea, vomiting, fevers and chills prior to arrival. Patient was found to have cholelithiasis on abdominal ultrasound. Patient underwent MRCP, ERCP and laparoscopic cholecystectomy with GI and Surgery. Patient was found to become tachycardic post - operatively and found to have Afib RVR. Internal Medicine has been consulted on Mr. Wechsler's care for medical management. Patient reports that he follows with Dr. Toribio Justice, cardiology. Patient denies history of atrial fibrillation or arrythmia in the past. Patient reports that his diabetes is controlled on Metformin 500 PO BID at home. States that he has not been on insulin for 7 years. Patient reports that his abdominal pain has improved following cholecystectomy. Patient denies chest pain, palpitations, SOB or dyspnea. Denies nausea or vomiting.       Cholelithiasis/ Cholangitis, S/P OR   - ABD US with --IMPRESSION: Cholelithiasis with equivocal sonographic evidence of acute cholecystitis. Hepatomegaly and hepatic steatosis. -- F/U GI and Surgery recs  - CT A/P  neg for acute intra-abdominal findings  - MRCP with Cholelithiasis, Pancreatic cysts. F/u GI recs for repeat imaging as an outpatient for further management and follow up   - S/P ERCP with  ascending cholangitis, with sludge, stone, pus removal   - S/P OR for lap choley; F/u pathology -- In lab   - C/w IV ABX per ID, currently on Zosyn  - Currently on regular diet, monitor for tolerance   - Pain control  - Monitor CBC, Temp curve, VS and patient closely   - Care per surgery and GI appreciated     Bacteremia   - BC 1/ 2 01/03 with Enterococcus  - F/u repeat BCx2  - F/u Echo to R/O vegetations  - Currently on Zosyn per ID  - Trend leukocytosis    - Monitor patient, VS, CBC and temp curve closely  - ID eval appreciated; F/u recs    New Onset Afib   - Patient states that he follows with Dr. Toribio Justice, cardiology. Patient would benefit from ischemic eval as an outpatient. Discussed with patient who states he will follow up with his cardiologist   - Care per EP appreciated  - S/P Cardizem gtt 01/08, Currently on Metoprolol 50 BID for rate control  - CHADSVASC score of 3, consider full dose AC once cleared from surgery standpoint  - F/u ECHO -- pending   - CTA PE protocol - Pre-Carbone neg for PE, F/u final   - Check TSH  - Monitor on Tele   - Monitor electrolytes closely to maintain K > 4 and Mg > 2.0   - Possible cardioversion per EP, F/u recs    DM  - A1C of 7.3  - C/w CC diet  - C/w sliding scale   - Monitor glucose levels closely; Avoid hypo/hyperglycemia     HLD  - C/w Lipitor 10 home dose  - Check lipid panel

## 2023-01-08 NOTE — CONSULT NOTE ADULT - SUBJECTIVE AND OBJECTIVE BOX
HPI:  Patient is a 68 year old male with PMHx significant for DM, HTN, and pancreatitis, presenting with nausea, vomiting, fevers, and chills of the past day. Symptoms started yesterday evening. Denies abdominal pain. Reports passing flatus and having BMs. Has a history of choledocholithiasis about 1 year ago for which he had an ERCP at that time; however, declined cholecystectomy at that time.    In ED, patient febrile and found to have elevated LFTs + elevated T. Bili. RUQ US in ED demonstrating gallstones. (04 Jan 2023 05:08)   HPI: Patient is a 68 year old male with a PMHx of DM, HTN, pancreatitis, who presented to [facility] with a chief complaint of nausea, vomiting, fevers and chills prior to arrival. Patient was found to have cholelithiasis on abdominal ultrasound. Patient underwent MRCP, ERCP and laparoscopic cholecystectomy with GI and Surgery. Patient was found to become tachycardic post - operatively and found to have Afib RVR. Internal Medicine has been consulted on Mr. Wechsler's care for medical management. Patient reports that he follows with Dr. Toribio Justice, cardiology. Patient denies history of atrial fibrillation or arrythmia in the past. Patient reports that his diabetes is controlled on Metformin 500 PO BID at home. States that he has not been on insulin for 7 years. Patient reports that his abdominal pain has improved following cholecystectomy. Patient denies chest pain, palpitations, SOB or dyspnea. Denies nausea or vomiting.       REVIEW OF SYSTEMS:    CONSTITUTIONAL: Generalized weakness   EYES/ENT: No visual changes;  No vertigo or throat pain   NECK: No pain or stiffness  RESPIRATORY: No cough, wheezing, hemoptysis; No shortness of breath  CARDIOVASCULAR: No chest pain or palpitations  GASTROINTESTINAL: S/P Lap choley, reports pain more controlled;  Drain; No nausea, vomiting, or hematemesis; No diarrhea or constipation. No melena or hematochezia.  GENITOURINARY: No dysuria, frequency or hematuria  NEUROLOGICAL: No numbness or weakness  SKIN: No itching, burning, rashes, or lesions   All other review of systems is negative unless indicated above.      PAST MEDICAL & SURGICAL HISTORY:  Back injury    H/O acute pancreatitis    Diabetes    No significant past surgical history        MEDICATIONS  (STANDING):  acetaminophen     Tablet .. 650 milliGRAM(s) Oral every 6 hours  aMIOdarone    Tablet 400 milliGRAM(s) Oral two times a day  apixaban 5 milliGRAM(s) Oral every 12 hours  atorvastatin 10 milliGRAM(s) Oral at bedtime  dextrose 5%. 1000 milliLiter(s) (50 mL/Hr) IV Continuous <Continuous>  dextrose 5%. 1000 milliLiter(s) (100 mL/Hr) IV Continuous <Continuous>  dextrose 50% Injectable 25 Gram(s) IV Push once  dextrose 50% Injectable 12.5 Gram(s) IV Push once  dextrose 50% Injectable 25 Gram(s) IV Push once  enalapril 10 milliGRAM(s) Oral daily  glucagon  Injectable 1 milliGRAM(s) IntraMuscular once  insulin lispro (ADMELOG) corrective regimen sliding scale   SubCutaneous at bedtime  insulin lispro (ADMELOG) corrective regimen sliding scale   SubCutaneous three times a day before meals  lidocaine   4% Patch 1 Patch Transdermal every 24 hours  metoprolol tartrate 50 milliGRAM(s) Oral two times a day  piperacillin/tazobactam IVPB.. 3.375 Gram(s) IV Intermittent every 8 hours          Allergies    No Known Allergies    Intolerances      Vital Signs Last 24 Hrs  T(C): 36.7 (08 Jan 2023 11:20), Max: 37.4 (07 Jan 2023 23:49)  T(F): 98 (08 Jan 2023 11:20), Max: 99.4 (07 Jan 2023 23:49)  HR: 78 (08 Jan 2023 12:39) (65 - 150)  BP: 125/87 (08 Jan 2023 12:39) (116/81 - 137/72)  BP(mean): 96 (08 Jan 2023 04:46) (93 - 96)  RR: 18 (08 Jan 2023 12:39) (16 - 18)  SpO2: 94% (08 Jan 2023 12:39) (91% - 96%)    Parameters below as of 08 Jan 2023 12:39  Patient On (Oxygen Delivery Method): room air          Appearance: Normal	  HEENT:   Normal oral mucosa, PERRL, EOMI	  Lymphatic: No lymphadenopathy , no edema  Cardiovascular: Irregularly irregular   Respiratory: Lungs clear to auscultation, normal effort 	  Gastrointestinal:  Soft, TTP in incision site; R sided ASTER drain with output   Skin: No rashes, No ecchymoses, No cyanosis, warm to touch  Musculoskeletal: Normal range of motion, normal strength  Psychiatry:  Mood & affect appropriate  Ext: No edema                              14.4   15.84 )-----------( 289      ( 08 Jan 2023 06:16 )             43.2               01-08    136  |  99  |  19  ----------------------------<  153<H>  3.8   |  23  |  1.17    Ca    9.1      08 Jan 2023 06:15  Phos  2.9     01-08  Mg     2.0     01-08    TPro  6.9  /  Alb  3.1<L>  /  TBili  1.2  /  DBili  x   /  AST  25  /  ALT  110<H>  /  AlkPhos  85  01-08           CARDIAC MARKERS ( 07 Jan 2023 01:22 )  x     / x     / x     / x     / 1.0 ng/mL            ABG - ( 07 Jan 2023 19:44 )  pH, Arterial: 7.49  pH, Blood: x     /  pCO2: 34    /  pO2: 73    / HCO3: 26    / Base Excess: 2.9   /  SaO2: 96.4            Culture - Blood (01.05.23 @ 17:30)   Specimen Source: .Blood Blood-Peripheral   Culture Results:   No growth to date.     Culture - Blood (01.05.23 @ 17:25)   Specimen Source: .Blood Blood-Peripheral   Culture Results:   No growth to date.     Culture - Urine (01.04.23 @ 06:37)   Specimen Source: Clean Catch Clean Catch (Midstream)   Culture Results:   No growth     Culture - Blood (01.03.23 @ 22:02)   - Gentamicin synergy: S <=500   - Streptomycin synergy: S <=1000   - Enterococcus species: Detec       Culture - Blood (01.03.23 @ 21:45)   Specimen Source: .Blood Blood-Peripheral   Culture Results:   No growth to date.               < from: US Abdomen Upper Quadrant Right (01.04.23 @ 02:02) >  IMPRESSION:  Cholelithiasis with equivocal sonographic evidence of acute cholecystitis.    Hepatomegaly and hepatic steatosis.    < end of copied text >          < from: CT Abdomen and Pelvis w/ IV Cont (01.03.23 @ 23:26) >  IMPRESSION:  No acute abdominopelvic abnormality.    < end of copied text >        < from: MR MRCP w/wo IV Cont (01.04.23 @ 22:26) >  IMPRESSION:  Cholelithiasis without choledocholithiasis.    Dilatation of the CBD measuring up to 1.1 cm.    Few pancreatic cysts measure less than 5 mm most consistent with side   branch IPMNs. Follow-up imaging in 12 months may be performed.    < end of copied text >      < from: ERCP (01.04.23 @ 15:18) >  Impression:          EGD:                       - LA Grade B esophagitis.                       - Non-bleeding erosive gastropathy.                       - Duodenal erosions without bleeding.                       EUS:      - Sludge and stone debris in a dilated common bile duct.                       - Sludge in the gallbladder                       ERCP:                       - Ascending cholangitis                       - Sludge, stones, and pus were completely removed from the dilated common                        bile duct with balloon sweeps.  Recommendation:      - Return patient to hospital [facility] for ongoing care.                       - May have clear liquid diet tonight from GI perspective.           - Trend CBC/LFTs                       - LR at 150 mL/hr x 2 liters then reassess.                       - Continue IV antibiotics for cholangitis for total course of 7-10 days.                       - Cholecystectomy, timing per surgery.    < end of copied text >      MRCP with -- -- pancreatic cysts     ERCP fond to have    found to have esophagitis

## 2023-01-08 NOTE — PROVIDER CONTACT NOTE (OTHER) - SITUATION
Patient Afib -150's.
Pt Afib 's .
Patient having frequent throat clearing since CT angio. Also Patient HR sustaining 140-150's
/90, systolic BP trend in 170's all day
c/o irregular HR & palpitations & being dehydrated

## 2023-01-08 NOTE — PROVIDER CONTACT NOTE (OTHER) - BACKGROUND
s/p mark anthony painter 1/5
S/P Lap choli Afib RVR new onset.
s/p lap coli 1/5
s/p mark anthony painter 1/5
Patient s/p lap choli 1/5  Afib rvr 1/7

## 2023-01-08 NOTE — PROGRESS NOTE ADULT - ASSESSMENT
68 year old male with PMHx significant for DM, HTN, and pancreatitis, presenting with nausea, vomiting, fevers, and chills of the past day.     RVP (1/3) Negative  UCx (1/4) No Growth  BCx (1/3) Enterococcus  BCx (1/5) NGTD    CT A/P (1/3) No acute abdominopelvic abnormality.  RUQ US (1/4) Cholelithiasis with equivocal sonographic evidence of acute cholecystitis.  MRCP (1/4) Cholelithiasis without choledocholithiasis. Dilatation of the CBD measuring up to 1.1 cm.    On 1/4 s/p ERCP with evidence of Ascending cholangitis with Sludge, stones, and pus were completely removed from the dilated common bile duct with balloon sweeps.    On 1/5 s/p Laparoscopic cholecystectomy    Antibiotic Course:   Zosyn: 1/4 -->    #Enterococcus Bacteremia, Cholangitis, Leukocytosis, Fever  --Follow up on TTE  --If persistent leukocytosis would repeat abdominal imaging  --Continue Zosyn (hope if continued defervescence, negative repeat cultures, unrevealing TTE to utilize PO for discharge)  --Continue to follow CBC with diff  --Continue to follow renal function (Cr/BUN)  --Continue to follow transaminases  --Continue to follow temperature curve  --Follow up on preliminary blood cultures    I will continue to follow. Please feel free to contact me with any further questions.    Anish Enriquez M.D.  [facility] Division of Infectious Disease  8AM-5PM Monday - Friday: Available on [employer] Teams  After Hours and Holidays (or if no response on [employer] Teams): Please contact the Infectious Diseases Office at [phone]

## 2023-01-08 NOTE — PROGRESS NOTE ADULT - ASSESSMENT
68y Male s/p laparoscopic cholecystectomy 1/5/23 c/b new onset Afib w/ RVR transferred to [facility] for telemetry monitoring.     PLAN:  - Appreciate EP recs; on Metoprolol BID  - Cardiac monitoring  - F/u ID recs for BCx 1/3   - Pain control; encourage Lidocaine patch  - Encourage IS  - Nausea control PRN  - Monitor vitals  - Diet: Regular   - Monitor I+Os  - OOB/ Ambulate  - DVT ppx    Red Team Surgery, [id number] 68y Male s/p laparoscopic cholecystectomy 1/5/23 c/b new onset Afib w/ RVR transferred to [facility] for telemetry monitoring.     PLAN:  - Appreciate EP recs; on Metoprolol BID, may need uptitration as rate is persistently high (>140)  - TSH, TTE as per EP  - Cardiac monitoring, tele  - F/u ID recs for BCx 1/3   - Pain control PRN  - Encourage IS  - Nausea control PRN  - Monitor vitals  - f/u am labs  - drain care  - Diet: Regular   - Monitor I+Os  - OOB/ Ambulate  - DVT ppx    Red Team Surgery, [id number]

## 2023-01-08 NOTE — PROGRESS NOTE ADULT - SUBJECTIVE AND OBJECTIVE BOX
GENERAL SURGERY PROGRESS NOTE    SUBJECTIVE  Overnight, patient received Diltiazem 10 IV following sustained tachycardia to 140-150s for approximately 50 minutes. During this period, the patient remained completely asymptomatic with a benign exam. Per EP recs, the patient received Diltiazem, following which his heart rate came down to about 90, and progressively fluctuated the remainder of the night between that number and 150, without being sustained over 140 for over thirty minutes. After receiving Diltiazem, the a CBC was also drawn which resulted normal, as well as an ABG which showed mild alkalosis, and an unremarkable CXR. CT chest with IV contrast was negative for PE on prelim read. Throughout the night, the patient remained asymptomatic and affable.    10-point review of systems completed and negative except as noted above.      OBJECTIVE    MEDICATIONS  acetaminophen     Tablet .. 650 milliGRAM(s) Oral every 6 hours  atorvastatin 10 milliGRAM(s) Oral at bedtime  dextrose 5%. 1000 milliLiter(s) IV Continuous <Continuous>  dextrose 5%. 1000 milliLiter(s) IV Continuous <Continuous>  dextrose 50% Injectable 25 Gram(s) IV Push once  dextrose 50% Injectable 12.5 Gram(s) IV Push once  dextrose 50% Injectable 25 Gram(s) IV Push once  dextrose Oral Gel 15 Gram(s) Oral once PRN  enalapril 10 milliGRAM(s) Oral daily  enoxaparin Injectable 40 milliGRAM(s) SubCutaneous every 24 hours  glucagon  Injectable 1 milliGRAM(s) IntraMuscular once  insulin lispro (ADMELOG) corrective regimen sliding scale   SubCutaneous at bedtime  insulin lispro (ADMELOG) corrective regimen sliding scale   SubCutaneous three times a day before meals  lidocaine   4% Patch 1 Patch Transdermal every 24 hours  metoprolol tartrate 50 milliGRAM(s) Oral two times a day  oxyCODONE    IR 2.5 milliGRAM(s) Oral every 4 hours PRN  oxyCODONE    IR 5 milliGRAM(s) Oral every 4 hours PRN  piperacillin/tazobactam IVPB.. 3.375 Gram(s) IV Intermittent every 8 hours      PHYSICAL EXAM  T(C): 36.7 (01-08-23 @ 04:46), Max: 37.4 (01-07-23 @ 23:49)  HR: 128 (01-08-23 @ 04:46) (76 - 150)  BP: 128/79 (01-08-23 @ 04:46) (100/70 - 137/72)  RR: 16 (01-08-23 @ 04:46) (16 - 18)  SpO2: 93% (01-08-23 @ 04:46) (91% - 96%)    01-06-23 @ 07:01  -  01-07-23 @ 07:00  --------------------------------------------------------  IN: 450 mL / OUT: 2025 mL / NET: -1575 mL    01-07-23 @ 07:01  -  01-08-23 @ 05:14  --------------------------------------------------------  IN: 720 mL / OUT: 550 mL / NET: 170 mL        Gen: NAD  LS: Respirations unlabored.   Card: Tachycardia, Afib w/ RVR.   GI: Soft. RUQ tenderness. Nondistended.   Ext: Warm, well perfused  LABS                        14.6   17.42 )-----------( 274      ( 07 Jan 2023 19:46 )             42.6     01-07    136  |  101  |  19  ----------------------------<  211<H>  4.1   |  20<L>  |  1.14    Ca    8.9      07 Jan 2023 19:46  Phos  3.2     01-07  Mg     2.0     01-07    TPro  7.1  /  Alb  3.2<L>  /  TBili  1.3<H>  /  DBili  x   /  AST  34  /  ALT  131<H>  /  AlkPhos  88  01-07    PT/INR - ( 06 Jan 2023 06:42 )   PT: 11.9 sec;   INR: 1.03 ratio               RADIOLOGY & ADDITIONAL STUDIES   GENERAL SURGERY PROGRESS NOTE    SUBJECTIVE  Overnight, patient received Diltiazem 10 IV following sustained tachycardia to 140-150s for approximately 50 minutes. During this period, the patient remained completely asymptomatic with a benign exam. Per EP recs, the patient received Diltiazem, following which his heart rate came down to about 90, and progressively fluctuated the remainder of the night between that number and 150, without being sustained over 140 for over thirty minutes. After receiving Diltiazem, the a CBC was also drawn which resulted normal, as well as an ABG which showed mild alkalosis, and an unremarkable CXR. CT chest with IV contrast was negative for PE on prelim read. Throughout the night, the patient remained asymptomatic and affable.    This morning, malencott drain fell out during administration of the rectal vancomycin and was put back in by surgical team.     10-point review of systems completed and negative except as noted above.      OBJECTIVE    MEDICATIONS  acetaminophen     Tablet .. 650 milliGRAM(s) Oral every 6 hours  atorvastatin 10 milliGRAM(s) Oral at bedtime  dextrose 5%. 1000 milliLiter(s) IV Continuous <Continuous>  dextrose 5%. 1000 milliLiter(s) IV Continuous <Continuous>  dextrose 50% Injectable 25 Gram(s) IV Push once  dextrose 50% Injectable 12.5 Gram(s) IV Push once  dextrose 50% Injectable 25 Gram(s) IV Push once  dextrose Oral Gel 15 Gram(s) Oral once PRN  enalapril 10 milliGRAM(s) Oral daily  enoxaparin Injectable 40 milliGRAM(s) SubCutaneous every 24 hours  glucagon  Injectable 1 milliGRAM(s) IntraMuscular once  insulin lispro (ADMELOG) corrective regimen sliding scale   SubCutaneous at bedtime  insulin lispro (ADMELOG) corrective regimen sliding scale   SubCutaneous three times a day before meals  lidocaine   4% Patch 1 Patch Transdermal every 24 hours  metoprolol tartrate 50 milliGRAM(s) Oral two times a day  oxyCODONE    IR 2.5 milliGRAM(s) Oral every 4 hours PRN  oxyCODONE    IR 5 milliGRAM(s) Oral every 4 hours PRN  piperacillin/tazobactam IVPB.. 3.375 Gram(s) IV Intermittent every 8 hours      PHYSICAL EXAM  T(C): 36.7 (01-08-23 @ 04:46), Max: 37.4 (01-07-23 @ 23:49)  HR: 128 (01-08-23 @ 04:46) (76 - 150)  BP: 128/79 (01-08-23 @ 04:46) (100/70 - 137/72)  RR: 16 (01-08-23 @ 04:46) (16 - 18)  SpO2: 93% (01-08-23 @ 04:46) (91% - 96%)    01-06-23 @ 07:01  -  01-07-23 @ 07:00  --------------------------------------------------------  IN: 450 mL / OUT: 2025 mL / NET: -1575 mL    01-07-23 @ 07:01  -  01-08-23 @ 05:14  --------------------------------------------------------  IN: 720 mL / OUT: 550 mL / NET: 170 mL        Gen: NAD  LS: Respirations unlabored.   Card: fast rate  GI: Soft. nondistended, tender in RUQ  Ext: Warm, well perfused    LABS                        14.6   17.42 )-----------( 274      ( 07 Jan 2023 19:46 )             42.6     01-07    136  |  101  |  19  ----------------------------<  211<H>  4.1   |  20<L>  |  1.14    Ca    8.9      07 Jan 2023 19:46  Phos  3.2     01-07  Mg     2.0     01-07    TPro  7.1  /  Alb  3.2<L>  /  TBili  1.3<H>  /  DBili  x   /  AST  34  /  ALT  131<H>  /  AlkPhos  88  01-07    PT/INR - ( 06 Jan 2023 06:42 )   PT: 11.9 sec;   INR: 1.03 ratio               RADIOLOGY & ADDITIONAL STUDIES   GENERAL SURGERY PROGRESS NOTE    SUBJECTIVE  Overnight, patient received Diltiazem 10 IV following sustained tachycardia to 140-150s for approximately 50 minutes. During this period, the patient remained completely asymptomatic with a benign exam. Per EP recs, the patient received Diltiazem, following which his heart rate came down to about 90, and progressively fluctuated the remainder of the night between that number and 150, without being sustained over 140 for over thirty minutes. After receiving Diltiazem, the a CBC was also drawn which resulted normal, as well as an ABG which showed mild alkalosis, and an unremarkable CXR. CT chest with IV contrast was negative for PE on prelim read. Throughout the night, the patient remained asymptomatic and affable.      10-point review of systems completed and negative except as noted above.      OBJECTIVE    MEDICATIONS  acetaminophen     Tablet .. 650 milliGRAM(s) Oral every 6 hours  atorvastatin 10 milliGRAM(s) Oral at bedtime  dextrose 5%. 1000 milliLiter(s) IV Continuous <Continuous>  dextrose 5%. 1000 milliLiter(s) IV Continuous <Continuous>  dextrose 50% Injectable 25 Gram(s) IV Push once  dextrose 50% Injectable 12.5 Gram(s) IV Push once  dextrose 50% Injectable 25 Gram(s) IV Push once  dextrose Oral Gel 15 Gram(s) Oral once PRN  enalapril 10 milliGRAM(s) Oral daily  enoxaparin Injectable 40 milliGRAM(s) SubCutaneous every 24 hours  glucagon  Injectable 1 milliGRAM(s) IntraMuscular once  insulin lispro (ADMELOG) corrective regimen sliding scale   SubCutaneous at bedtime  insulin lispro (ADMELOG) corrective regimen sliding scale   SubCutaneous three times a day before meals  lidocaine   4% Patch 1 Patch Transdermal every 24 hours  metoprolol tartrate 50 milliGRAM(s) Oral two times a day  oxyCODONE    IR 2.5 milliGRAM(s) Oral every 4 hours PRN  oxyCODONE    IR 5 milliGRAM(s) Oral every 4 hours PRN  piperacillin/tazobactam IVPB.. 3.375 Gram(s) IV Intermittent every 8 hours      PHYSICAL EXAM  T(C): 36.7 (01-08-23 @ 04:46), Max: 37.4 (01-07-23 @ 23:49)  HR: 128 (01-08-23 @ 04:46) (76 - 150)  BP: 128/79 (01-08-23 @ 04:46) (100/70 - 137/72)  RR: 16 (01-08-23 @ 04:46) (16 - 18)  SpO2: 93% (01-08-23 @ 04:46) (91% - 96%)    01-06-23 @ 07:01  -  01-07-23 @ 07:00  --------------------------------------------------------  IN: 450 mL / OUT: 2025 mL / NET: -1575 mL    01-07-23 @ 07:01  -  01-08-23 @ 05:14  --------------------------------------------------------  IN: 720 mL / OUT: 550 mL / NET: 170 mL        Gen: NAD  LS: Respirations unlabored.   Card: fast rate  GI: Soft. nondistended, tender in RUQ  Ext: Warm, well perfused    LABS                        14.6   17.42 )-----------( 274      ( 07 Jan 2023 19:46 )             42.6     01-07    136  |  101  |  19  ----------------------------<  211<H>  4.1   |  20<L>  |  1.14    Ca    8.9      07 Jan 2023 19:46  Phos  3.2     01-07  Mg     2.0     01-07    TPro  7.1  /  Alb  3.2<L>  /  TBili  1.3<H>  /  DBili  x   /  AST  34  /  ALT  131<H>  /  AlkPhos  88  01-07    PT/INR - ( 06 Jan 2023 06:42 )   PT: 11.9 sec;   INR: 1.03 ratio               RADIOLOGY & ADDITIONAL STUDIES

## 2023-01-08 NOTE — PROGRESS NOTE ADULT - SUBJECTIVE AND OBJECTIVE BOX
Follow Up:      Interval History:    REVIEW OF SYSTEMS  [  ] ROS unobtainable because:    [  ] All other systems negative except as noted below    Constitutional:  [ ] fever [ ] chills  [ ] weight loss  [ ] weakness  Skin:  [ ] rash [ ] phlebitis	  Eyes: [ ] icterus [ ] pain  [ ] discharge	  ENMT: [ ] sore throat  [ ] thrush [ ] ulcers [ ] exudates  Respiratory: [ ] dyspnea [ ] hemoptysis [ ] cough [ ] sputum	  Cardiovascular:  [ ] chest pain [ ] palpitations [ ] edema	  Gastrointestinal:  [ ] nausea [ ] vomiting [ ] diarrhea [ ] constipation [ ] pain	  Genitourinary:  [ ] dysuria [ ] frequency [ ] hematuria [ ] discharge [ ] flank pain  [ ] incontinence  Musculoskeletal:  [ ] myalgias [ ] arthralgias [ ] arthritis  [ ] back pain  Neurological:  [ ] headache [ ] seizures  [ ] confusion/altered mental status    Allergies  No Known Allergies        ANTIMICROBIALS:  piperacillin/tazobactam IVPB.. 3.375 every 8 hours      OTHER MEDS:  MEDICATIONS  (STANDING):  acetaminophen     Tablet .. 650 every 6 hours  aMIOdarone    Tablet 400 two times a day  apixaban 5 every 12 hours  atorvastatin 10 at bedtime  dextrose 50% Injectable 25 once  dextrose 50% Injectable 12.5 once  dextrose 50% Injectable 25 once  dextrose Oral Gel 15 once PRN  enalapril 10 daily  glucagon  Injectable 1 once  insulin lispro (ADMELOG) corrective regimen sliding scale  at bedtime  insulin lispro (ADMELOG) corrective regimen sliding scale  three times a day before meals  metoprolol tartrate 50 two times a day  oxyCODONE    IR 5 every 4 hours PRN  oxyCODONE    IR 2.5 every 4 hours PRN      Vital Signs Last 24 Hrs  T(C): 36.7 (08 Jan 2023 11:20), Max: 37.4 (07 Jan 2023 23:49)  T(F): 98 (08 Jan 2023 11:20), Max: 99.4 (07 Jan 2023 23:49)  HR: 78 (08 Jan 2023 12:39) (65 - 150)  BP: 125/87 (08 Jan 2023 12:39) (116/81 - 137/72)  BP(mean): 96 (08 Jan 2023 04:46) (93 - 96)  RR: 18 (08 Jan 2023 12:39) (16 - 18)  SpO2: 94% (08 Jan 2023 12:39) (91% - 96%)    Parameters below as of 08 Jan 2023 12:39  Patient On (Oxygen Delivery Method): room air        PHYSICAL EXAMINATION:  General: Alert and Awake, NAD  HEENT: PERRL, EOMI  Neck: Supple  Cardiac: RRR, No M/R/G  Resp: CTAB, No Wh/Rh/Ra  Abdomen: NBS, NT/ND, No HSM, No rigidity or guarding  MSK: No LE edema. No Calf tenderness  : No tanner  Skin: No rashes or lesions. Skin is warm and dry to the touch.   Neuro: Alert and Awake. CN 2-12 Grossly intact. Moves all four extremities spontaneously.  Psych: Calm, Pleasant, Cooperative                          14.4   15.84 )-----------( 289      ( 08 Jan 2023 06:16 )             43.2       01-08    136  |  99  |  19  ----------------------------<  153<H>  3.8   |  23  |  1.17    Ca    9.1      08 Jan 2023 06:15  Phos  2.9     01-08  Mg     2.0     01-08    TPro  6.9  /  Alb  3.1<L>  /  TBili  1.2  /  DBili  x   /  AST  25  /  ALT  110<H>  /  AlkPhos  85  01-08          MICROBIOLOGY:  v  .Blood Blood-Peripheral  01-05-23   No growth to date.  --  --      .Blood Blood-Peripheral  01-05-23   No growth to date.  --  --      Clean Catch Clean Catch (Midstream)  01-04-23   No growth  --  --      .Blood Blood-Peripheral  01-03-23   Growth in anaerobic bottle: Enterococcus gallinarum  ***Blood Panel PCR results on this specimen are available  approximately 3 hours after the Gram stain result.***  Gram stain, PCR, and/or culture results may not always  correspond due to difference in methodologies.  ************************************************************  This PCR assay was performed by multiplex PCR. This  Assay tests for 66 bacterial and resistance gene targets.  Please refer to the [facility] test directory  at [email or web address] for details.  --  Blood Culture PCR  Enterococcus gallinarum      .Blood Blood-Peripheral  01-03-23   No growth to date.  --  --          Rapid RVP Result: Heavenly (01-03 @ 22:13)        RADIOLOGY:    <The imaging below has been reviewed and visualized by me independently. Findings as detailed in report below> Follow Up:  Bacteremia    Interval History: A-Fib with RVR overnight. minimal pain at RUQ    REVIEW OF SYSTEMS  [  ] ROS unobtainable because:    [ x ] All other systems negative except as noted below:	    Constitutional:  [ ] fever [ ] chills  [ ] weight loss  [ ] weakness  Skin:  [ ] rash [ ] phlebitis	  Eyes: [ ] icterus [ ] pain  [ ] discharge	  ENMT: [ ] sore throat  [ ] thrush [ ] ulcers [ ] exudates  Respiratory: [ ] dyspnea [ ] hemoptysis [ ] cough [ ] sputum	  Cardiovascular:  [ ] chest pain [ ] palpitations [ ] edema	  Gastrointestinal:  [ ] nausea [ ] vomiting [ ] diarrhea [ ] constipation [ x] pain	  Genitourinary:  [ ] dysuria [ ] frequency [ ] hematuria [ ] discharge [ ] flank pain  [ ] incontinence  Musculoskeletal:  [ ] myalgias [ ] arthralgias [ ] arthritis  [ ] back pain  Neurological:  [ ] headache [ ] seizures  [ ] confusion/altered mental status  Psychiatric:  [ ] anxiety [ ] depression	    Allergies  No Known Allergies        ANTIMICROBIALS:  piperacillin/tazobactam IVPB.. 3.375 every 8 hours      OTHER MEDS:  MEDICATIONS  (STANDING):  acetaminophen     Tablet .. 650 every 6 hours  aMIOdarone    Tablet 400 two times a day  apixaban 5 every 12 hours  atorvastatin 10 at bedtime  dextrose 50% Injectable 25 once  dextrose 50% Injectable 12.5 once  dextrose 50% Injectable 25 once  dextrose Oral Gel 15 once PRN  enalapril 10 daily  glucagon  Injectable 1 once  insulin lispro (ADMELOG) corrective regimen sliding scale  at bedtime  insulin lispro (ADMELOG) corrective regimen sliding scale  three times a day before meals  metoprolol tartrate 50 two times a day  oxyCODONE    IR 5 every 4 hours PRN  oxyCODONE    IR 2.5 every 4 hours PRN      Vital Signs Last 24 Hrs  T(C): 36.7 (08 Jan 2023 11:20), Max: 37.4 (07 Jan 2023 23:49)  T(F): 98 (08 Jan 2023 11:20), Max: 99.4 (07 Jan 2023 23:49)  HR: 78 (08 Jan 2023 12:39) (65 - 150)  BP: 125/87 (08 Jan 2023 12:39) (116/81 - 137/72)  BP(mean): 96 (08 Jan 2023 04:46) (93 - 96)  RR: 18 (08 Jan 2023 12:39) (16 - 18)  SpO2: 94% (08 Jan 2023 12:39) (91% - 96%)    Parameters below as of 08 Jan 2023 12:39  Patient On (Oxygen Delivery Method): room air    PHYSICAL EXAMINATION:  General: Alert and Awake, NAD  HEENT: PERRL, EOMI  Neck: Supple-  Cardiac: RRR, No M/R/G  Resp: CTAB, No Wh/Rh/Ra  Abdomen: NBS, Mild RUQ tenderness to palpation, ND  MSK: No LE edema. No stigmata of IE. No evidence of phlebitis. No evidence of synovitis.  : No tanner  Skin: No rashes or lesions. Skin is warm and dry to the touch.   Neuro: Alert and Awake. CN 2-12 Grossly intact. Moves all four extremities spontaneously.  Psych: Calm, Pleasant, Cooperative                            14.4   15.84 )-----------( 289      ( 08 Jan 2023 06:16 )             43.2       01-08    136  |  99  |  19  ----------------------------<  153<H>  3.8   |  23  |  1.17    Ca    9.1      08 Jan 2023 06:15  Phos  2.9     01-08  Mg     2.0     01-08    TPro  6.9  /  Alb  3.1<L>  /  TBili  1.2  /  DBili  x   /  AST  25  /  ALT  110<H>  /  AlkPhos  85  01-08    MICROBIOLOGY:    .Blood Blood-Peripheral  01-05-23   No growth to date.  --  --    .Blood Blood-Peripheral  01-05-23   No growth to date.  --  --    Clean Catch Clean Catch (Midstream)  01-04-23   No growth  --  --    .Blood Blood-Peripheral  01-03-23   Growth in anaerobic bottle: Enterococcus gallinarum  ***Blood Panel PCR results on this specimen are available  approximately 3 hours after the Gram stain result.***  Gram stain, PCR, and/or culture results may not always  correspond due to difference in methodologies.  ************************************************************  This PCR assay was performed by multiplex PCR. This  Assay tests for 66 bacterial and resistance gene targets.  Please refer to the [facility] Labs test directory  at [email or web address] for details.  --  Blood Culture PCR  Enterococcus gallinarum      .Blood Blood-Peripheral  01-03-23   No growth to date.  --  --          Rapid RVP Result: NotDetec (01-03 @ 22:13)        RADIOLOGY:    <The imaging below has been reviewed and visualized by me independently. Findings as detailed in report below>    < from: CT Angio Chest PE Protocol w/ IV Cont (01.07.23 @ 21:01) >  IMPRESSION:    1.  No pulmonary thromboembolism    2.  Nonspecific ill-defined groundglass density in the posterior right   upper lobe. Reassessment can be made on follow-up imaging.    < end of copied text >

## 2023-01-09 LAB
ANION GAP SERPL CALC-SCNC: 12 MMOL/L — SIGNIFICANT CHANGE UP (ref 5–17)
APTT BLD: 37.5 SEC — HIGH (ref 27.5–35.5)
BLD GP AB SCN SERPL QL: NEGATIVE — SIGNIFICANT CHANGE UP
BUN SERPL-MCNC: 27 MG/DL — HIGH (ref 7–23)
CALCIUM SERPL-MCNC: 9.2 MG/DL — SIGNIFICANT CHANGE UP (ref 8.4–10.5)
CHLORIDE SERPL-SCNC: 102 MMOL/L — SIGNIFICANT CHANGE UP (ref 96–108)
CO2 SERPL-SCNC: 24 MMOL/L — SIGNIFICANT CHANGE UP (ref 22–31)
CREAT SERPL-MCNC: 1.35 MG/DL — HIGH (ref 0.5–1.3)
CULTURE RESULTS: SIGNIFICANT CHANGE UP
EGFR: 58 ML/MIN/1.73M2 — LOW
GLUCOSE BLDC GLUCOMTR-MCNC: 144 MG/DL — HIGH (ref 70–99)
GLUCOSE BLDC GLUCOMTR-MCNC: 165 MG/DL — HIGH (ref 70–99)
GLUCOSE BLDC GLUCOMTR-MCNC: 185 MG/DL — HIGH (ref 70–99)
GLUCOSE BLDC GLUCOMTR-MCNC: 196 MG/DL — HIGH (ref 70–99)
GLUCOSE SERPL-MCNC: 174 MG/DL — HIGH (ref 70–99)
HCT VFR BLD CALC: 42 % — SIGNIFICANT CHANGE UP (ref 39–50)
HGB BLD-MCNC: 14.2 G/DL — SIGNIFICANT CHANGE UP (ref 13–17)
INR BLD: 1.32 RATIO — HIGH (ref 0.88–1.16)
MAGNESIUM SERPL-MCNC: 2.2 MG/DL — SIGNIFICANT CHANGE UP (ref 1.6–2.6)
MCHC RBC-ENTMCNC: 28.9 PG — SIGNIFICANT CHANGE UP (ref 27–34)
MCHC RBC-ENTMCNC: 33.8 GM/DL — SIGNIFICANT CHANGE UP (ref 32–36)
MCV RBC AUTO: 85.5 FL — SIGNIFICANT CHANGE UP (ref 80–100)
NRBC # BLD: 0 /100 WBCS — SIGNIFICANT CHANGE UP (ref 0–0)
PHOSPHATE SERPL-MCNC: 3.1 MG/DL — SIGNIFICANT CHANGE UP (ref 2.5–4.5)
PLATELET # BLD AUTO: 336 K/UL — SIGNIFICANT CHANGE UP (ref 150–400)
POTASSIUM SERPL-MCNC: 3.7 MMOL/L — SIGNIFICANT CHANGE UP (ref 3.5–5.3)
POTASSIUM SERPL-SCNC: 3.7 MMOL/L — SIGNIFICANT CHANGE UP (ref 3.5–5.3)
PROTHROM AB SERPL-ACNC: 15.4 SEC — HIGH (ref 10.5–13.4)
RBC # BLD: 4.91 M/UL — SIGNIFICANT CHANGE UP (ref 4.2–5.8)
RBC # FLD: 14.4 % — SIGNIFICANT CHANGE UP (ref 10.3–14.5)
RH IG SCN BLD-IMP: POSITIVE — SIGNIFICANT CHANGE UP
SARS-COV-2 RNA SPEC QL NAA+PROBE: SIGNIFICANT CHANGE UP
SODIUM SERPL-SCNC: 138 MMOL/L — SIGNIFICANT CHANGE UP (ref 135–145)
SPECIMEN SOURCE: SIGNIFICANT CHANGE UP
WBC # BLD: 16.02 K/UL — HIGH (ref 3.8–10.5)
WBC # FLD AUTO: 16.02 K/UL — HIGH (ref 3.8–10.5)

## 2023-01-09 PROCEDURE — 99233 SBSQ HOSP IP/OBS HIGH 50: CPT

## 2023-01-09 PROCEDURE — 93306 TTE W/DOPPLER COMPLETE: CPT | Mod: 26

## 2023-01-09 PROCEDURE — 99232 SBSQ HOSP IP/OBS MODERATE 35: CPT | Mod: FS

## 2023-01-09 RX ORDER — POTASSIUM CHLORIDE 20 MEQ
20 PACKET (EA) ORAL ONCE
Refills: 0 | Status: COMPLETED | OUTPATIENT
Start: 2023-01-09 | End: 2023-01-09

## 2023-01-09 RX ORDER — DEXTROSE MONOHYDRATE, SODIUM CHLORIDE, AND POTASSIUM CHLORIDE 50; .745; 4.5 G/1000ML; G/1000ML; G/1000ML
1000 INJECTION, SOLUTION INTRAVENOUS
Refills: 0 | Status: DISCONTINUED | OUTPATIENT
Start: 2023-01-09 | End: 2023-01-10

## 2023-01-09 RX ADMIN — APIXABAN 5 MILLIGRAM(S): 2.5 TABLET, FILM COATED ORAL at 18:06

## 2023-01-09 RX ADMIN — DEXTROSE MONOHYDRATE, SODIUM CHLORIDE, AND POTASSIUM CHLORIDE 75 MILLILITER(S): 50; .745; 4.5 INJECTION, SOLUTION INTRAVENOUS at 18:05

## 2023-01-09 RX ADMIN — Medication 650 MILLIGRAM(S): at 12:48

## 2023-01-09 RX ADMIN — PIPERACILLIN AND TAZOBACTAM 25 GRAM(S): 4; .5 INJECTION, POWDER, LYOPHILIZED, FOR SOLUTION INTRAVENOUS at 12:47

## 2023-01-09 RX ADMIN — Medication 650 MILLIGRAM(S): at 22:39

## 2023-01-09 RX ADMIN — PIPERACILLIN AND TAZOBACTAM 25 GRAM(S): 4; .5 INJECTION, POWDER, LYOPHILIZED, FOR SOLUTION INTRAVENOUS at 05:08

## 2023-01-09 RX ADMIN — Medication 650 MILLIGRAM(S): at 23:10

## 2023-01-09 RX ADMIN — DEXTROSE MONOHYDRATE, SODIUM CHLORIDE, AND POTASSIUM CHLORIDE 125 MILLILITER(S): 50; .745; 4.5 INJECTION, SOLUTION INTRAVENOUS at 06:36

## 2023-01-09 RX ADMIN — Medication 650 MILLIGRAM(S): at 19:00

## 2023-01-09 RX ADMIN — Medication 650 MILLIGRAM(S): at 18:06

## 2023-01-09 RX ADMIN — Medication 650 MILLIGRAM(S): at 05:07

## 2023-01-09 RX ADMIN — Medication 650 MILLIGRAM(S): at 06:05

## 2023-01-09 RX ADMIN — PIPERACILLIN AND TAZOBACTAM 25 GRAM(S): 4; .5 INJECTION, POWDER, LYOPHILIZED, FOR SOLUTION INTRAVENOUS at 22:39

## 2023-01-09 RX ADMIN — Medication 1: at 08:48

## 2023-01-09 RX ADMIN — ATORVASTATIN CALCIUM 10 MILLIGRAM(S): 80 TABLET, FILM COATED ORAL at 22:39

## 2023-01-09 RX ADMIN — Medication 50 MILLIGRAM(S): at 05:07

## 2023-01-09 RX ADMIN — APIXABAN 5 MILLIGRAM(S): 2.5 TABLET, FILM COATED ORAL at 05:08

## 2023-01-09 RX ADMIN — Medication 650 MILLIGRAM(S): at 13:45

## 2023-01-09 RX ADMIN — AMIODARONE HYDROCHLORIDE 400 MILLIGRAM(S): 400 TABLET ORAL at 05:08

## 2023-01-09 RX ADMIN — Medication 1: at 12:48

## 2023-01-09 RX ADMIN — Medication 20 MILLIEQUIVALENT(S): at 06:35

## 2023-01-09 RX ADMIN — Medication 50 MILLIGRAM(S): at 18:06

## 2023-01-09 RX ADMIN — AMIODARONE HYDROCHLORIDE 400 MILLIGRAM(S): 400 TABLET ORAL at 18:05

## 2023-01-09 RX ADMIN — Medication 10 MILLIGRAM(S): at 05:07

## 2023-01-09 NOTE — PROGRESS NOTE ADULT - SUBJECTIVE AND OBJECTIVE BOX
Follow Up:      Interval History:    REVIEW OF SYSTEMS  [  ] ROS unobtainable because:    [  ] All other systems negative except as noted below    Constitutional:  [ ] fever [ ] chills  [ ] weight loss  [ ] weakness  Skin:  [ ] rash [ ] phlebitis	  Eyes: [ ] icterus [ ] pain  [ ] discharge	  ENMT: [ ] sore throat  [ ] thrush [ ] ulcers [ ] exudates  Respiratory: [ ] dyspnea [ ] hemoptysis [ ] cough [ ] sputum	  Cardiovascular:  [ ] chest pain [ ] palpitations [ ] edema	  Gastrointestinal:  [ ] nausea [ ] vomiting [ ] diarrhea [ ] constipation [ ] pain	  Genitourinary:  [ ] dysuria [ ] frequency [ ] hematuria [ ] discharge [ ] flank pain  [ ] incontinence  Musculoskeletal:  [ ] myalgias [ ] arthralgias [ ] arthritis  [ ] back pain  Neurological:  [ ] headache [ ] seizures  [ ] confusion/altered mental status    Allergies  No Known Allergies        ANTIMICROBIALS:  piperacillin/tazobactam IVPB.. 3.375 every 8 hours      OTHER MEDS:  MEDICATIONS  (STANDING):  acetaminophen     Tablet .. 650 every 6 hours  aMIOdarone    Tablet 400 two times a day  apixaban 5 every 12 hours  atorvastatin 10 at bedtime  dextrose 50% Injectable 25 once  dextrose 50% Injectable 12.5 once  dextrose 50% Injectable 25 once  dextrose Oral Gel 15 once PRN  enalapril 10 daily  glucagon  Injectable 1 once  insulin lispro (ADMELOG) corrective regimen sliding scale  at bedtime  insulin lispro (ADMELOG) corrective regimen sliding scale  three times a day before meals  metoprolol tartrate 50 two times a day  oxyCODONE    IR 5 every 4 hours PRN  oxyCODONE    IR 2.5 every 4 hours PRN      Vital Signs Last 24 Hrs  T(C): 36.7 (09 Jan 2023 12:22), Max: 37.1 (08 Jan 2023 20:52)  T(F): 98.1 (09 Jan 2023 12:22), Max: 98.7 (08 Jan 2023 20:52)  HR: 71 (09 Jan 2023 17:12) (67 - 74)  BP: 175/94 (09 Jan 2023 17:12) (145/90 - 175/94)  BP(mean): --  RR: 18 (09 Jan 2023 12:22) (18 - 18)  SpO2: 96% (09 Jan 2023 12:22) (95% - 96%)    Parameters below as of 09 Jan 2023 12:22  Patient On (Oxygen Delivery Method): room air        PHYSICAL EXAMINATION:  General: Alert and Awake, NAD  HEENT: PERRL, EOMI  Neck: Supple  Cardiac: RRR, No M/R/G  Resp: CTAB, No Wh/Rh/Ra  Abdomen: NBS, NT/ND, No HSM, No rigidity or guarding  MSK: No LE edema. No Calf tenderness  : No tanner  Skin: No rashes or lesions. Skin is warm and dry to the touch.   Neuro: Alert and Awake. CN 2-12 Grossly intact. Moves all four extremities spontaneously.  Psych: Calm, Pleasant, Cooperative                          14.2   16.02 )-----------( 336      ( 09 Jan 2023 05:00 )             42.0       01-09    138  |  102  |  27<H>  ----------------------------<  174<H>  3.7   |  24  |  1.35<H>    Ca    9.2      09 Jan 2023 05:00  Phos  3.1     01-09  Mg     2.2     01-09    TPro  6.9  /  Alb  3.1<L>  /  TBili  1.2  /  DBili  x   /  AST  25  /  ALT  110<H>  /  AlkPhos  85  01-08          MICROBIOLOGY:  v  .Blood Blood-Peripheral  01-05-23   No growth to date.  --  --      .Blood Blood-Peripheral  01-05-23   No growth to date.  --  --      Clean Catch Clean Catch (Midstream)  01-04-23   No growth  --  --      .Blood Blood-Peripheral  01-03-23   Growth in anaerobic bottle: Enterococcus gallinarum  ***Blood Panel PCR results on this specimen are available  approximately 3 hours after the Gram stain result.***  Gram stain, PCR, and/or culture results may not always  correspond due to difference in methodologies.  ************************************************************  This PCR assay was performed by multiplex PCR. This  Assay tests for 66 bacterial and resistance gene targets.  Please refer to the [facility] test directory  at [email or web address] for details.  --  Blood Culture PCR  Enterococcus gallinarum      .Blood Blood-Peripheral  01-03-23   No Growth Final  --  --          Rapid RVP Result: Heavenly (01-03 @ 22:13)        RADIOLOGY:    <The imaging below has been reviewed and visualized by me independently. Findings as detailed in report below> Follow Up:  Bacteremia    Interval History: afebrile. continued RUQ pain.     REVIEW OF SYSTEMS  [  ] ROS unobtainable because:    [ x ] All other systems negative except as noted below:	    Constitutional:  [ ] fever [ ] chills  [ ] weight loss  [ ] weakness  Skin:  [ ] rash [ ] phlebitis	  Eyes: [ ] icterus [ ] pain  [ ] discharge	  ENMT: [ ] sore throat  [ ] thrush [ ] ulcers [ ] exudates  Respiratory: [ ] dyspnea [ ] hemoptysis [ ] cough [ ] sputum	  Cardiovascular:  [ ] chest pain [ ] palpitations [ ] edema	  Gastrointestinal:  [ ] nausea [ ] vomiting [ ] diarrhea [ ] constipation [ x] pain	  Genitourinary:  [ ] dysuria [ ] frequency [ ] hematuria [ ] discharge [ ] flank pain  [ ] incontinence  Musculoskeletal:  [ ] myalgias [ ] arthralgias [ ] arthritis  [ ] back pain  Neurological:  [ ] headache [ ] seizures  [ ] confusion/altered mental status  Psychiatric:  [ ] anxiety [ ] depression	    Allergies  No Known Allergies        ANTIMICROBIALS:  piperacillin/tazobactam IVPB.. 3.375 every 8 hours      OTHER MEDS:  MEDICATIONS  (STANDING):  acetaminophen     Tablet .. 650 every 6 hours  aMIOdarone    Tablet 400 two times a day  apixaban 5 every 12 hours  atorvastatin 10 at bedtime  dextrose 50% Injectable 25 once  dextrose 50% Injectable 12.5 once  dextrose 50% Injectable 25 once  dextrose Oral Gel 15 once PRN  enalapril 10 daily  glucagon  Injectable 1 once  insulin lispro (ADMELOG) corrective regimen sliding scale  at bedtime  insulin lispro (ADMELOG) corrective regimen sliding scale  three times a day before meals  metoprolol tartrate 50 two times a day  oxyCODONE    IR 5 every 4 hours PRN  oxyCODONE    IR 2.5 every 4 hours PRN      Vital Signs Last 24 Hrs  T(C): 36.7 (09 Jan 2023 12:22), Max: 37.1 (08 Jan 2023 20:52)  T(F): 98.1 (09 Jan 2023 12:22), Max: 98.7 (08 Jan 2023 20:52)  HR: 71 (09 Jan 2023 17:12) (67 - 74)  BP: 175/94 (09 Jan 2023 17:12) (145/90 - 175/94)  BP(mean): --  RR: 18 (09 Jan 2023 12:22) (18 - 18)  SpO2: 96% (09 Jan 2023 12:22) (95% - 96%)    Parameters below as of 09 Jan 2023 12:22  Patient On (Oxygen Delivery Method): room air    PHYSICAL EXAMINATION:  General: Alert and Awake, NAD  HEENT: Normocephalic / Atraumatic  Resp: No accessory muscles of respiration utilized  Abdomen: NBS, Mild RUQ tenderness to palpation, ND  MSK: No LE edema.   : No tanner  Skin: No rashes or lesions.    Neuro: Alert and Awake. CN 2-12 Grossly intact. Moves all four extremities spontaneously.  Psych: Calm, Pleasant, Cooperative                        14.2   16.02 )-----------( 336      ( 09 Jan 2023 05:00 )             42.0       01-09    138  |  102  |  27<H>  ----------------------------<  174<H>  3.7   |  24  |  1.35<H>    Ca    9.2      09 Jan 2023 05:00  Phos  3.1     01-09  Mg     2.2     01-09    TPro  6.9  /  Alb  3.1<L>  /  TBili  1.2  /  DBili  x   /  AST  25  /  ALT  110<H>  /  AlkPhos  85  01-08          MICROBIOLOGY:  v  .Blood Blood-Peripheral  01-05-23   No growth to date.  --  --      .Blood Blood-Peripheral  01-05-23   No growth to date.  --  --      Clean Catch Clean Catch (Midstream)  01-04-23   No growth  --  --      .Blood Blood-Peripheral  01-03-23   Growth in anaerobic bottle: Enterococcus gallinarum  ***Blood Panel PCR results on this specimen are available  approximately 3 hours after the Gram stain result.***  Gram stain, PCR, and/or culture results may not always  correspond due to difference in methodologies.  ************************************************************  This PCR assay was performed by multiplex PCR. This  Assay tests for 66 bacterial and resistance gene targets.  Please refer to the [facility] test directory  at [email or web address] for details.  --  Blood Culture PCR  Enterococcus gallinarum      .Blood Blood-Peripheral  01-03-23   No Growth Final  --  --          Rapid RVP Result: NotDetec (01-03 @ 22:13)        RADIOLOGY:    <The imaging below has been reviewed and visualized by me independently. Findings as detailed in report below>    < from: CT Angio Chest PE Protocol w/ IV Cont (01.07.23 @ 21:01) >  IMPRESSION:    1.  No pulmonary thromboembolism    2.  Nonspecific ill-defined groundglass density in the posterior right   upper lobe. Reassessment can be made on follow-up imaging.    < end of copied text >

## 2023-01-09 NOTE — PROGRESS NOTE ADULT - ATTENDING COMMENTS
DATE OF SERVICE: 01-05-23 @ 15:45    S/p ERCP with sludge/pus evacuation yesterday.  Bilis downtrending  WBC NL    Plan for lap carlene today  NPO  IV abx  Repeat labs including LFTs in AM
DATE OF SERVICE: 01-07-23 @ 14:57    Seen and examined.  Overnight patient felt palpitations, found to be in AFib RVR. Given metoprolol/cardizem. Started PO metoprolol. Labs sent at that time showed elevated WBC, Bilirubin stable and LFTs downtrending, Cr improved from yesterday  This AM states he feels better, mild incisional pain better from yesterday  Vitals reviewed  Abdomen soft, mild incisional TTP; ASTER serosanguinous    + Bcx prior to ERCP, repeat negative, will consult ID for further recs  Cardiology following, appreciate their recs  Medicine consult for DM  Monitor HR  LFTs improving today, will repeat in AM  repeat Hb in AM, if stable ok for AC from surgical perspective  Monitor drain output  Monitor HR for rate/rhythm
DATE OF SERVICE: 01-09-23 @ 17:27    seen and examined. in sinus now on Amio.  No abdominal pain. Afebrile  WBC 16 today    ASTER Serosanguinous    Abdomen soft NT/ND    Continue abx  Am labs w CBC  remains afebrile at this time, drain output is serosanguinous  Cardiology follow up given echo  Fu ID for recs  Appreciate medical team mgt
DATE OF SERVICE: 01-08-23 @ 15:52    Seen and examined. Persistent AFib yesterday, treated with intermittent cardizem push and PO metoprolol. Started on cardizem infusion this morning with good response. ABG yesterday with mild hypoxia, CTA PE negative for PE.     No complaints per pt.   WBC downtrending, LFTs downtrending, Cr WNL    Abdomen soft NT/ND, ASTER serosanguinous     Continue cardizem infusion  FU cardiology for additional recs  pending TTE  ID following  Appreciate medicine team input  Diet as tolerated  AM labs  Monitor HR/vitals

## 2023-01-09 NOTE — PROGRESS NOTE ADULT - ASSESSMENT
69 y/o M with DM HTN Pancreatitis p/w cholecystitis s/p laparoscopic cholecystectomy 1/5/23, course c/b AF RVR for which EP was consulted. CHADSVASC 3 (age, DM, HTN).  TTE with EF 50-55%.     1.  AFib with RVR in the setting of cholecystitis  2.  Probable Moderate to severe MR, poorly visualized on TTE        - Telemetry review with NSR 60's-70's, converted on 1/8 AM no conversion pause  - Continue Metoprolol Tartrate to 50mg PO BID    - Started on Amiodarone 400mg PO BID on 1/8 AM, patient has received 800mg thus far.    Amiodarone load instructions: Amiodarone 400mg BID for 1 week, then 200mg BID for 1 week, then 200mg daily there after. Upon hospital discharge can change Amiodarone to 200mg once daily.   - While on Amiodarone, monitor TSH, LFT's.  TSH 4.15 on 1/8, AST 25,  on 1/8.   On Amiodarone out patient, will need monitoring of TSH, LFT's, Opthalmology evaluation and pulmonary function tests.  Black box warnings reinforced with patient.    - Continue on DOAC, Eliquis 5mg BID    - Patient will follow up with his Cardiologist Dr. Toribio Justice   - EP will sign off, reconsult as needed       ALMA Tavarez [facility]  [phone]

## 2023-01-09 NOTE — PROGRESS NOTE ADULT - SUBJECTIVE AND OBJECTIVE BOX
Name of Patient : JEFFREY WECHSLER  MRN: [id number]  Date of visit: 01-09-23 @ 14:17      Subjective: Patient seen and examined. No new events except as noted.   Patient seen earlier this AM.   Reports feeling better. Reports his abdominal discomfort is controlled.  Planned for TTE today    REVIEW OF SYSTEMS:    CONSTITUTIONAL: No weakness, fevers or chills  EYES/ENT: No visual changes;  No vertigo or throat pain   NECK: No pain or stiffness  RESPIRATORY: No cough, wheezing, hemoptysis; No shortness of breath  CARDIOVASCULAR: No chest pain or palpitations  GASTROINTESTINAL: + ASTER Drain; S/P Lap choley, reports pain more controlled; No abdominal or epigastric pain. No nausea, vomiting, or hematemesis; No diarrhea or constipation. No melena or hematochezia.  GENITOURINARY: No dysuria, frequency or hematuria  NEUROLOGICAL: No numbness or weakness  SKIN: No itching, burning, rashes, or lesions   All other review of systems is negative unless indicated above.    MEDICATIONS:  MEDICATIONS  (STANDING):  acetaminophen     Tablet .. 650 milliGRAM(s) Oral every 6 hours  aMIOdarone    Tablet 400 milliGRAM(s) Oral two times a day  apixaban 5 milliGRAM(s) Oral every 12 hours  atorvastatin 10 milliGRAM(s) Oral at bedtime  dextrose 5% + sodium chloride 0.45% with potassium chloride 20 mEq/L 1000 milliLiter(s) (125 mL/Hr) IV Continuous <Continuous>  dextrose 5%. 1000 milliLiter(s) (100 mL/Hr) IV Continuous <Continuous>  dextrose 5%. 1000 milliLiter(s) (50 mL/Hr) IV Continuous <Continuous>  dextrose 50% Injectable 25 Gram(s) IV Push once  dextrose 50% Injectable 12.5 Gram(s) IV Push once  dextrose 50% Injectable 25 Gram(s) IV Push once  enalapril 10 milliGRAM(s) Oral daily  glucagon  Injectable 1 milliGRAM(s) IntraMuscular once  insulin lispro (ADMELOG) corrective regimen sliding scale   SubCutaneous at bedtime  insulin lispro (ADMELOG) corrective regimen sliding scale   SubCutaneous three times a day before meals  lidocaine   4% Patch 1 Patch Transdermal every 24 hours  metoprolol tartrate 50 milliGRAM(s) Oral two times a day  piperacillin/tazobactam IVPB.. 3.375 Gram(s) IV Intermittent every 8 hours      PHYSICAL EXAM:  T(C): 36.7 (01-09-23 @ 12:22), Max: 37.1 (01-08-23 @ 20:52)  HR: 70 (01-09-23 @ 12:22) (67 - 74)  BP: 158/89 (01-09-23 @ 12:30) (120/75 - 170/115)  RR: 18 (01-09-23 @ 12:22) (18 - 18)  SpO2: 96% (01-09-23 @ 12:22) (94% - 96%)  Wt(kg): --  I&O's Summary    08 Jan 2023 07:01  -  09 Jan 2023 07:00  --------------------------------------------------------  IN: 945 mL / OUT: 25 mL / NET: 920 mL    09 Jan 2023 07:01  -  09 Jan 2023 14:17  --------------------------------------------------------  IN: 100 mL / OUT: 35 mL / NET: 65 mL          Appearance: Normal	  HEENT:  PERRLA   Lymphatic: No lymphadenopathy   Cardiovascular: Normal S1 S2, no JVD  Respiratory: normal effort , clear  Gastrointestinal:  Soft, TTP in incision site; R sided ASTER drain   Skin: No rashes,  warm to touch  Psychiatry:  Mood & affect appropriate  Musculoskeletal: No edema        01-08-23 @ 07:01  -  01-09-23 @ 07:00  --------------------------------------------------------  IN: 945 mL / OUT: 25 mL / NET: 920 mL    01-09-23 @ 07:01  -  01-09-23 @ 14:17  --------------------------------------------------------  IN: 100 mL / OUT: 35 mL / NET: 65 mL                                14.2   16.02 )-----------( 336      ( 09 Jan 2023 05:00 )             42.0               01-09    138  |  102  |  27<H>  ----------------------------<  174<H>  3.7   |  24  |  1.35<H>    Ca    9.2      09 Jan 2023 05:00  Phos  3.1     01-09  Mg     2.2     01-09    TPro  6.9  /  Alb  3.1<L>  /  TBili  1.2  /  DBili  x   /  AST  25  /  ALT  110<H>  /  AlkPhos  85  01-08    PT/INR - ( 09 Jan 2023 08:50 )   PT: 15.4 sec;   INR: 1.32 ratio         PTT - ( 09 Jan 2023 08:50 )  PTT:37.5 sec                 ABG - ( 07 Jan 2023 19:44 )  pH, Arterial: 7.49  pH, Blood: x     /  pCO2: 34    /  pO2: 73    / HCO3: 26    / Base Excess: 2.9   /  SaO2: 96.4                  Culture - Blood (01.05.23 @ 17:30)   Specimen Source: .Blood Blood-Peripheral   Culture Results: No growth to date.     Culture - Blood (01.05.23 @ 17:25)   Specimen Source: .Blood Blood-Peripheral   Culture Results: No growth to date.     Culture - Urine (01.04.23 @ 06:37)   Specimen Source: Clean Catch Clean Catch (Midstream)   Culture Results: No growth       < from: TTE with Doppler (w/Cont) (01.09.23 @ 09:23) >    Patient name: WECHSLER, JEFFREY  YOB: 1955   Age: 68 (M)   MR#: [id number]  Study Date: 1/9/2023  [id number]  Study quality: Technically fair  Referring Physician: Martin Lopez MD  Blood Pressure: 158/96 mmHg  Height: 178 cm  Weight: 101 kg  BSA: 2.2 m2  Heart Rate: 64 mmHg  ------------------------------------------------------------------------  PROCEDURE: Transthoracic echocardiogram with 2-D, M-Mode  and complete spectral and color flow Doppler. Verbal  consent was obtained for injection of  Ultrasonic Enhancing  Agent following a discussion of risks and benefits.  Following intravenous injection of Ultrasonic Enhancing  Agent, harmonic imaging was performed.  INDICATION: Abnormal electrocardiogram (ECG) (EKG) (R94.31)  ------------------------------------------------------------------------  Dimensions:    Normal Values:  LA:     4.9    2.0 - 4.0 cm  Ao:     3.5    2.0 - 3.8 cm  SEPTUM: 1.1    0.6 - 1.2 cm  PWT:    1.3  0.6 - 1.1 cm  LVIDd:  5.2    3.0 - 5.6 cm  LVIDs:  3.3    1.8 - 4.0 cm  Derived variables:  LVMI: 114 g/m2  RWT: 0.50  Fractional short: 37 %  EF (Visual Estimate): 50-55 %  Doppler Peak Velocity (m/sec): AoV=1.3  ------------------------------------------------------------------------  Observations:  Mitral Valve: Mitral valve is not well visualized. The  severity of  mitral regurgitation is not well visualized.  It is probably at least moderate-severe and it is  horizontal in direction. Due to this appearance, there is  likely a flail scallop.  Aortic Valve/Aorta: Normal trileaflet aortic valve. Peak  transaortic valve gradient equals 7 mm Hg. Minimal aortic  regurgitation.  Peak left ventricular outflow tract  gradient equals 3 mm Hg, mean gradient is equal to 2 mm Hg,  LVOT velocity time integral equals 18 cm.  Aortic Root: 3.5 cm.  Ascending Aorta: 3.4 cm.  LVOT diameter: 2.1 cm.  Left Atrium: Mildly dilated left atrium.  LA volume index =  38 cc/m2.  Left Ventricle: Endocardial visualization enhanced with  intravenous injection of Ultrasonic Enhancing Agent  (Definity). Hypokinesis of the anteroseptum, septum, and  inferolateral wall. Visually, LVEF is 50-55%. Low normal  left ventricular systolic function. Normal left ventricular  internal dimensions and wall thicknesses. Moderate  diastolic dysfunction (Stage II). Increased E/e'  is  consistent with elevated left ventricular filling pressure.  Right Heart: Normal right atrium. Normal right ventricular  size and function.Normal tricuspid valve. Mild tricuspid  regurgitation. Normal pulmonic valve. Mild pulmonic  regurgitation.  Pericardium/Pleura: No pericardial effusion seen.  Hemodynamic: Estimated right ventricular systolic pressure  equals 17 mm Hg, assuming right atrial pressure equals 3 mm  Hg, consistent with normal pulmonary pressures.  ------------------------------------------------------------------------  Conclusions:  1. Mitral valve is not well visualized. The severity of  mitral regurgitation is not well visualized. It is probably  at least moderate-severe and it is horizontal in direction.  Due to this appearance, there is likely a flail scallop.  Would suggest BERNARDO for further evaluation of the mitral  valve.  2. Normal trileaflet aortic valve.  3. Mildly dilated left atrium.  LA volume index = 38  cc/m2.Endocardial visualization enhanced with intravenous  injection of Ultrasonic Enhancing Agent (Definity).  Hypokinesis of the anteroseptum, septum, and inferolateral  wall. Visually, LVEF is 50-55%. Low normal left ventricular  systolic function.  Moderate diastolic dysfunction (Stage II). Increased E/e'  is consistent with elevated left ventricular filling  pressure.  4. Normal right atrium.Normal right ventricular size and  function.  5. Normal tricuspid valve. Mild tricuspid  regurgitation.Estimated pulmonary artery systolic pressure  equals 17 mm Hg, assuming right atrial pressure equals 3  mm Hg, consistent with normal pulmonary pressures.  6. No pericardial effusion seen.  ***No previous Echo exam.  ------------------------------------------------------------------------  Confirmed on  1/9/2023 - 11:43:50 by Laura Souza M.D.  ------------------------------------------------------------------------    < end of copied text >

## 2023-01-09 NOTE — PROGRESS NOTE ADULT - ASSESSMENT
68y Male s/p laparoscopic cholecystectomy 1/5/23 c/b new onset Afib w/ RVR transferred to [facility] for telemetry monitoring.     PLAN:  - Appreciate EP recs; on Metoprolol BID, Amiodarone; f/u need for +/- cardioversion  - Diet: Regular   - Cardiac monitoring, tele  - F/u ID recs for BCx 1/3; f/u TTE  - Pain control PRN  - Encourage IS  - Nausea control PRN  - Monitor vitals  - f/u am labs  - drain care  - Monitor I+Os  - OOB/ Ambulate  - DVT ppx    Red Team Surgery, [id number]

## 2023-01-09 NOTE — PROGRESS NOTE ADULT - SUBJECTIVE AND OBJECTIVE BOX
Surgery Red Team Daily Progress Note   WECHSLER, JEFFREY | [id number]  --------------------------------------------------------------------------------------------------------------------  SUBJECTIVE / 24H EVENTS  Patient seen and examined on morning rounds. No acute events overnight.  --------------------------------------------------------------------------------------------------------------------  OBJECTIVE:    VITAL SIGNS:  T(C): 36.7 (23 @ 05:24), Max: 37.1 (23 @ 20:52)  HR: 67 (23 @ 05:24) (65 - 135)  BP: 158/96 (23 @ 05:24) (120/75 - 158/96)  RR: 18 (23 @ 05:24) (18 - 18)  SpO2: 95% (23 @ 05:24) (94% - 96%)  Daily     Daily Weight in k.6 (2023 06:46)  POCT Blood Glucose.: 169 mg/dL (23 @ 21:11)  POCT Blood Glucose.: 129 mg/dL (23 @ 16:55)  POCT Blood Glucose.: 185 mg/dL (23 @ 11:38)      PHYSICAL EXAM:  Gen: NAD  LS: Respirations unlabored.   Card: RRR.   GI: Soft. Nontender. Nondistended. ASTER w/ s/s output holding suction.  Ext: Warm, well perfused      23 @ 07:01  -  23 @ 07:00  --------------------------------------------------------  IN:    IV PiggyBack: 200 mL    Oral Fluid: 745 mL  Total IN: 945 mL    OUT:    Bulb (mL): 25 mL  Total OUT: 25 mL    Total NET: 920 mL          LAB VALUES:      138  |  102  |  27<H>  ----------------------------<  174<H>  3.7   |  24  |  1.35<H>    Ca    9.2      2023 05:00  Phos  3.1       Mg     2.2         TPro  6.9  /  Alb  3.1<L>  /  TBili  1.2  /  DBili  x   /  AST  25  /  ALT  110<H>  /  AlkPhos  85                                 14.2   16.02 )-----------( 336      ( 2023 05:00 )             42.0     LIVER FUNCTIONS - ( 2023 06:15 )  Alb: 3.1 g/dL / Pro: 6.9 g/dL / ALK PHOS: 85 U/L / ALT: 110 U/L / AST: 25 U/L / GGT: x             ABG - ( 2023 19:44 )  pH, Arterial: 7.49  pH, Blood: x     /  pCO2: 34    /  pO2: 73    / HCO3: 26    / Base Excess: 2.9   /  SaO2: 96.4                      MICROBIOLOGY:    No new microbiology data for review.     RADIOLOGY:    No new radiographic images for review.    MEDICATIONS  (STANDING):  acetaminophen     Tablet .. 650 milliGRAM(s) Oral every 6 hours  aMIOdarone    Tablet 400 milliGRAM(s) Oral two times a day  apixaban 5 milliGRAM(s) Oral every 12 hours  atorvastatin 10 milliGRAM(s) Oral at bedtime  dextrose 5% + sodium chloride 0.45% with potassium chloride 20 mEq/L 1000 milliLiter(s) (125 mL/Hr) IV Continuous <Continuous>  dextrose 5%. 1000 milliLiter(s) (100 mL/Hr) IV Continuous <Continuous>  dextrose 5%. 1000 milliLiter(s) (50 mL/Hr) IV Continuous <Continuous>  dextrose 50% Injectable 25 Gram(s) IV Push once  dextrose 50% Injectable 12.5 Gram(s) IV Push once  dextrose 50% Injectable 25 Gram(s) IV Push once  enalapril 10 milliGRAM(s) Oral daily  glucagon  Injectable 1 milliGRAM(s) IntraMuscular once  insulin lispro (ADMELOG) corrective regimen sliding scale   SubCutaneous at bedtime  insulin lispro (ADMELOG) corrective regimen sliding scale   SubCutaneous three times a day before meals  lidocaine   4% Patch 1 Patch Transdermal every 24 hours  metoprolol tartrate 50 milliGRAM(s) Oral two times a day  piperacillin/tazobactam IVPB.. 3.375 Gram(s) IV Intermittent every 8 hours    MEDICATIONS  (PRN):  dextrose Oral Gel 15 Gram(s) Oral once PRN Blood Glucose LESS THAN 70 milliGRAM(s)/deciliter  oxyCODONE    IR 5 milliGRAM(s) Oral every 4 hours PRN Severe Pain (7 - 10)  oxyCODONE    IR 2.5 milliGRAM(s) Oral every 4 hours PRN Moderate Pain (4 - 6)

## 2023-01-09 NOTE — PROGRESS NOTE ADULT - ASSESSMENT
Patient is a 68 year old male with a PMHx of DM, HTN, pancreatitis, who presented to [facility] with a chief complaint of nausea, vomiting, fevers and chills prior to arrival. Patient was found to have cholelithiasis on abdominal ultrasound. Patient underwent MRCP, ERCP and laparoscopic cholecystectomy with GI and Surgery. Patient was found to become tachycardic post - operatively and found to have Afib RVR. Internal Medicine has been consulted on Mr. Wechsler's care for medical management. Patient reports that he follows with Dr. Toribio Justice, cardiology. Patient denies history of atrial fibrillation or arrythmia in the past. Patient reports that his diabetes is controlled on Metformin 500 PO BID at home. States that he has not been on insulin for 7 years. Patient reports that his abdominal pain has improved following cholecystectomy. Patient denies chest pain, palpitations, SOB or dyspnea. Denies nausea or vomiting.       Cholelithiasis/ Cholangitis, S/P OR   - ABD US with --IMPRESSION: Cholelithiasis with equivocal sonographic evidence of acute cholecystitis. Hepatomegaly and hepatic steatosis. -- F/U GI and Surgery recs  - CT A/P  neg for acute intra-abdominal findings  - MRCP with Cholelithiasis, Pancreatic cysts. F/u GI recs for repeat imaging as an outpatient for further management and follow up   - S/P ERCP with  ascending cholangitis, with sludge, stone, pus removal   - S/P OR for lap choley; F/u pathology -- In lab   - C/w IV ABX per ID, currently on Zosyn  - Currently on regular diet, monitor for tolerance   - Pain control  - Monitor CBC, Temp curve, VS and patient closely   - Care per surgery and GI appreciated   - Monitor leukocytosis; trend to normal     Bacteremia   - BC 1/ 2 01/03 with Enterococcus  - F/u repeat BCx2-- NGTD; F/u final   - F/u Echo to R/O vegetations  - Currently on Zosyn per ID  - Trend leukocytosis    - Monitor patient, VS, CBC and temp curve closely  - ID eval appreciated; F/u recs    New Onset Afib   - Patient states that he follows with Dr. Toribio Justice, cardiology. Patient would benefit from ischemic eval as an outpatient. Discussed with patient who states he will follow up with his cardiologist   - Care per EP appreciated  - S/P Cardizem gtt 01/08, Currently on Metoprolol 50 BID for rate control  - CHADSVASC score of 3, consider full dose AC once cleared from surgery standpoint - pt now on Eliquis, monitor H/H closely   - F/u ECHO -- as noted; Patient will require close outpatient follow up with cardiology for further ischemic work up and management of Mod-Severe MR    - CTA PE protocol - Neg for PE; Ill - defined lesion --> Patient will require close outpatient follow up for repeat imaging as an outpatient   - TSH WNL  4.15   - Monitor on Tele   - Monitor electrolytes closely to maintain K > 4 and Mg > 2.0   - Possible cardioversion per EP, F/u recs    ZANE  - Check bladder scan to R/O retention   - C/w IVF for hydration  - Avoid nephrotoxic agents  - Monitor and trend Cr in AM     DM  - A1C of 7.3  - C/w CC diet  - C/w sliding scale   - Monitor glucose levels closely; Avoid hypo/hyperglycemia     HLD  - C/w Lipitor 10 home dose  - Check lipid panel      Patient is a 68 year old male with a PMHx of DM, HTN, pancreatitis, who presented to [facility] with a chief complaint of nausea, vomiting, fevers and chills prior to arrival. Patient was found to have cholelithiasis on abdominal ultrasound. Patient underwent MRCP, ERCP and laparoscopic cholecystectomy with GI and Surgery. Patient was found to become tachycardic post - operatively and found to have Afib RVR. Internal Medicine has been consulted on Mr. Wechsler's care for medical management. Patient reports that he follows with Dr. Toribio Justice, cardiology. Patient denies history of atrial fibrillation or arrythmia in the past. Patient reports that his diabetes is controlled on Metformin 500 PO BID at home. States that he has not been on insulin for 7 years. Patient reports that his abdominal pain has improved following cholecystectomy. Patient denies chest pain, palpitations, SOB or dyspnea. Denies nausea or vomiting.       Cholelithiasis/ Cholangitis, S/P OR   - ABD US with --IMPRESSION: Cholelithiasis with equivocal sonographic evidence of acute cholecystitis. Hepatomegaly and hepatic steatosis. -- F/U GI and Surgery recs  - CT A/P  neg for acute intra-abdominal findings  - MRCP with Cholelithiasis, Pancreatic cysts. F/u GI recs for repeat imaging as an outpatient for further management and follow up   - S/P ERCP with  ascending cholangitis, with sludge, stone, pus removal   - S/P OR for lap choley; F/u pathology -- In lab   - C/w IV ABX per ID, currently on Zosyn  - Currently on regular diet, monitor for tolerance   - Pain control  - Monitor CBC, Temp curve, VS and patient closely   - Care per surgery and GI appreciated   - Monitor leukocytosis; trend to normal     Bacteremia   - BC 1/ 2 01/03 with Enterococcus  - F/u repeat BCx2-- NGTD; F/u final   - F/u Echo to R/O vegetations  - Currently on Zosyn per ID  - Trend leukocytosis    - Monitor patient, VS, CBC and temp curve closely  - ID eval appreciated; F/u recs    New Onset Afib   - Patient states that he follows with Dr. Toribio Justice, cardiology. Patient would benefit from ischemic eval as an outpatient. Discussed with patient who states he will follow up with his cardiologist   - Care per EP appreciated  - S/P Cardizem gtt 01/08, Currently on Metoprolol 50 BID for rate control  - CHADSVASC score of 3, consider full dose AC once cleared from surgery standpoint - pt now on Eliquis, monitor H/H closely   - F/u ECHO -- as noted; Patient will require close outpatient follow up with cardiology for further ischemic work up and management of Mod-Severe MR    - CTA PE protocol - Neg for PE; Ill - defined lesion --> Patient will require close outpatient follow up for repeat imaging as an outpatient for close monitoring; F/u with PCP within 1 week of discharge   - TSH WNL  4.15   - Monitor on Tele   - Monitor electrolytes closely to maintain K > 4 and Mg > 2.0   - Possible cardioversion per EP, F/u recs    ZANE  - Check bladder scan to R/O retention   - C/w IVF for hydration  - Avoid nephrotoxic agents  - Monitor and trend Cr in AM     DM  - A1C of 7.3  - C/w CC diet  - C/w sliding scale   - Monitor glucose levels closely; Avoid hypo/hyperglycemia     HLD  - C/w Lipitor 10 home dose  - Check lipid panel      Patient is a 68 year old male with a PMHx of DM, HTN, pancreatitis, who presented to [facility] with a chief complaint of nausea, vomiting, fevers and chills prior to arrival. Patient was found to have cholelithiasis on abdominal ultrasound. Patient underwent MRCP, ERCP and laparoscopic cholecystectomy with GI and Surgery. Patient was found to become tachycardic post - operatively and found to have Afib RVR. Internal Medicine has been consulted on Mr. Wechsler's care for medical management. Patient reports that he follows with Dr. Toribio Justice, cardiology. Patient denies history of atrial fibrillation or arrythmia in the past. Patient reports that his diabetes is controlled on Metformin 500 PO BID at home. States that he has not been on insulin for 7 years. Patient reports that his abdominal pain has improved following cholecystectomy. Patient denies chest pain, palpitations, SOB or dyspnea. Denies nausea or vomiting.       Cholelithiasis/ Cholangitis, S/P OR   - ABD US with --IMPRESSION: Cholelithiasis with equivocal sonographic evidence of acute cholecystitis. Hepatomegaly and hepatic steatosis. -- F/U GI and Surgery recs  - CT A/P  neg for acute intra-abdominal findings  - MRCP with Cholelithiasis, Pancreatic cysts. F/u GI recs for repeat imaging as an outpatient for further management and follow up   - S/P ERCP with  ascending cholangitis, with sludge, stone, pus removal   - S/P OR for lap choley; F/u pathology -- In lab   - C/w IV ABX per ID, currently on Zosyn  - Currently on regular diet, monitor for tolerance   - Pain control  - Monitor CBC, Temp curve, VS and patient closely   - Care per surgery and GI appreciated   - Monitor leukocytosis; trend to normal     Bacteremia   - BC 1/ 2 01/03 with Enterococcus  - F/u repeat BCx2-- NGTD; F/u final   - F/u Echo to R/O vegetations  - Currently on Zosyn per ID  - Trend leukocytosis    - Monitor patient, VS, CBC and temp curve closely  - ID eval appreciated; F/u recs    New Onset Afib   - Patient states that he follows with Dr. Toribio Justice, cardiology. Patient would benefit from ischemic eval as an outpatient. Discussed with patient who states he will follow up with his cardiologist   - Care per EP appreciated  - S/P Cardizem gtt 01/08, Currently on Metoprolol 50 BID for rate control  - CHADSVASC score of 3, consider full dose AC once cleared from surgery standpoint - pt now on Eliquis, monitor H/H closely   - F/u ECHO -- as noted; Patient will require close outpatient follow up with cardiology for further ischemic work up and management of Mod-Severe MR    - CTA PE protocol - Neg for PE; Ill - defined lesion --> Patient will require close outpatient follow up for repeat imaging as an outpatient for close monitoring; F/u with PCP within 1 week of discharge   - TSH WNL  4.15   - Monitor on Tele   - Monitor electrolytes closely to maintain K > 4 and Mg > 2.0   - Possible cardioversion per EP, F/u recs    ZANE  - Check bladder scan to R/O retention   - C/w IVF for hydration  - Avoid nephrotoxic agents  - Monitor and trend Cr in AM     DM  - A1C of 7.3  - C/w CC diet  - C/w sliding scale   - Monitor glucose levels closely; Avoid hypo/hyperglycemia     HLD  - C/w Lipitor 10 home dose  - Check lipid panel       PPX  - Eliquis 5 BID

## 2023-01-09 NOTE — PROGRESS NOTE ADULT - ASSESSMENT
68 year old male with PMHx significant for DM, HTN, and pancreatitis, presenting with nausea, vomiting, fevers, and chills of the past day.     RVP (1/3) Negative  UCx (1/4) No Growth  BCx (1/3) Enterococcus  BCx (1/5) NGTD    CT A/P (1/3) No acute abdominopelvic abnormality.  RUQ US (1/4) Cholelithiasis with equivocal sonographic evidence of acute cholecystitis.  MRCP (1/4) Cholelithiasis without choledocholithiasis. Dilatation of the CBD measuring up to 1.1 cm.    On 1/4 s/p ERCP with evidence of Ascending cholangitis with Sludge, stones, and pus were completely removed from the dilated common bile duct with balloon sweeps.    On 1/5 s/p Laparoscopic cholecystectomy    Antibiotic Course:   Zosyn: 1/4 -->    #Enterococcus Bacteremia, Cholangitis, Leukocytosis, Fever  --Given persistent leukocytosis recommend CT A/P   --TTE with no obvious vegetations but with noted Moderate to Severe MR: consider cardiology input and/or BERNARDO  --Continue Zosyn (hope if continued defervescence, negative repeat cultures, unrevealing TTE to utilize PO for discharge)  --Continue to follow CBC with diff  --Continue to follow renal function (Cr/BUN)  --Continue to follow transaminases  --Continue to follow temperature curve  --Follow up on preliminary blood cultures    I will continue to follow. Please feel free to contact me with any further questions.    Anish Enriquez M.D.  [facility] Division of Infectious Disease  8AM-5PM Monday - Friday: Available on [employer] Teams  After Hours and Holidays (or if no response on [employer] Teams): Please contact the Infectious Diseases Office at [phone]    The above assessment and plan were discussed with Surgery Team

## 2023-01-10 ENCOUNTER — TRANSCRIPTION ENCOUNTER (OUTPATIENT)
Age: 68
End: 2023-01-10

## 2023-01-10 VITALS
HEART RATE: 59 BPM | SYSTOLIC BLOOD PRESSURE: 169 MMHG | TEMPERATURE: 99 F | RESPIRATION RATE: 16 BRPM | DIASTOLIC BLOOD PRESSURE: 95 MMHG | OXYGEN SATURATION: 96 %

## 2023-01-10 DIAGNOSIS — Z90.49 ACQUIRED ABSENCE OF OTHER SPECIFIED PARTS OF DIGESTIVE TRACT: ICD-10-CM

## 2023-01-10 DIAGNOSIS — I48.91 UNSPECIFIED ATRIAL FIBRILLATION: ICD-10-CM

## 2023-01-10 DIAGNOSIS — R78.81 BACTEREMIA: ICD-10-CM

## 2023-01-10 DIAGNOSIS — I34.0 NONRHEUMATIC MITRAL (VALVE) INSUFFICIENCY: ICD-10-CM

## 2023-01-10 LAB
ALBUMIN SERPL ELPH-MCNC: 3.4 G/DL — SIGNIFICANT CHANGE UP (ref 3.3–5)
ALP SERPL-CCNC: 70 U/L — SIGNIFICANT CHANGE UP (ref 40–120)
ALT FLD-CCNC: 70 U/L — HIGH (ref 10–45)
ANION GAP SERPL CALC-SCNC: 13 MMOL/L — SIGNIFICANT CHANGE UP (ref 5–17)
AST SERPL-CCNC: 25 U/L — SIGNIFICANT CHANGE UP (ref 10–40)
BILIRUB SERPL-MCNC: 0.9 MG/DL — SIGNIFICANT CHANGE UP (ref 0.2–1.2)
BUN SERPL-MCNC: 21 MG/DL — SIGNIFICANT CHANGE UP (ref 7–23)
CALCIUM SERPL-MCNC: 9.3 MG/DL — SIGNIFICANT CHANGE UP (ref 8.4–10.5)
CHLORIDE SERPL-SCNC: 102 MMOL/L — SIGNIFICANT CHANGE UP (ref 96–108)
CO2 SERPL-SCNC: 22 MMOL/L — SIGNIFICANT CHANGE UP (ref 22–31)
CREAT SERPL-MCNC: 1.28 MG/DL — SIGNIFICANT CHANGE UP (ref 0.5–1.3)
CULTURE RESULTS: SIGNIFICANT CHANGE UP
CULTURE RESULTS: SIGNIFICANT CHANGE UP
EGFR: 61 ML/MIN/1.73M2 — SIGNIFICANT CHANGE UP
GLUCOSE BLDC GLUCOMTR-MCNC: 112 MG/DL — HIGH (ref 70–99)
GLUCOSE BLDC GLUCOMTR-MCNC: 162 MG/DL — HIGH (ref 70–99)
GLUCOSE BLDC GLUCOMTR-MCNC: 164 MG/DL — HIGH (ref 70–99)
GLUCOSE SERPL-MCNC: 173 MG/DL — HIGH (ref 70–99)
HCT VFR BLD CALC: 39.8 % — SIGNIFICANT CHANGE UP (ref 39–50)
HGB BLD-MCNC: 13.4 G/DL — SIGNIFICANT CHANGE UP (ref 13–17)
MAGNESIUM SERPL-MCNC: 2.1 MG/DL — SIGNIFICANT CHANGE UP (ref 1.6–2.6)
MCHC RBC-ENTMCNC: 28.9 PG — SIGNIFICANT CHANGE UP (ref 27–34)
MCHC RBC-ENTMCNC: 33.7 GM/DL — SIGNIFICANT CHANGE UP (ref 32–36)
MCV RBC AUTO: 85.8 FL — SIGNIFICANT CHANGE UP (ref 80–100)
NRBC # BLD: 0 /100 WBCS — SIGNIFICANT CHANGE UP (ref 0–0)
PHOSPHATE SERPL-MCNC: 2.8 MG/DL — SIGNIFICANT CHANGE UP (ref 2.5–4.5)
PLATELET # BLD AUTO: 340 K/UL — SIGNIFICANT CHANGE UP (ref 150–400)
POTASSIUM SERPL-MCNC: 4.1 MMOL/L — SIGNIFICANT CHANGE UP (ref 3.5–5.3)
POTASSIUM SERPL-SCNC: 4.1 MMOL/L — SIGNIFICANT CHANGE UP (ref 3.5–5.3)
PROT SERPL-MCNC: 7.1 G/DL — SIGNIFICANT CHANGE UP (ref 6–8.3)
RBC # BLD: 4.64 M/UL — SIGNIFICANT CHANGE UP (ref 4.2–5.8)
RBC # FLD: 14.2 % — SIGNIFICANT CHANGE UP (ref 10.3–14.5)
SODIUM SERPL-SCNC: 137 MMOL/L — SIGNIFICANT CHANGE UP (ref 135–145)
SPECIMEN SOURCE: SIGNIFICANT CHANGE UP
SPECIMEN SOURCE: SIGNIFICANT CHANGE UP
WBC # BLD: 13.65 K/UL — HIGH (ref 3.8–10.5)
WBC # FLD AUTO: 13.65 K/UL — HIGH (ref 3.8–10.5)

## 2023-01-10 PROCEDURE — 85610 PROTHROMBIN TIME: CPT

## 2023-01-10 PROCEDURE — 85027 COMPLETE CBC AUTOMATED: CPT

## 2023-01-10 PROCEDURE — U0003: CPT

## 2023-01-10 PROCEDURE — 82553 CREATINE MB FRACTION: CPT

## 2023-01-10 PROCEDURE — 74177 CT ABD & PELVIS W/CONTRAST: CPT | Mod: MA

## 2023-01-10 PROCEDURE — 87077 CULTURE AEROBIC IDENTIFY: CPT

## 2023-01-10 PROCEDURE — C8929: CPT

## 2023-01-10 PROCEDURE — 88304 TISSUE EXAM BY PATHOLOGIST: CPT

## 2023-01-10 PROCEDURE — 86900 BLOOD TYPING SEROLOGIC ABO: CPT

## 2023-01-10 PROCEDURE — 84484 ASSAY OF TROPONIN QUANT: CPT

## 2023-01-10 PROCEDURE — 85018 HEMOGLOBIN: CPT

## 2023-01-10 PROCEDURE — C1769: CPT

## 2023-01-10 PROCEDURE — 87040 BLOOD CULTURE FOR BACTERIA: CPT

## 2023-01-10 PROCEDURE — C9399: CPT

## 2023-01-10 PROCEDURE — 36415 COLL VENOUS BLD VENIPUNCTURE: CPT

## 2023-01-10 PROCEDURE — 87086 URINE CULTURE/COLONY COUNT: CPT

## 2023-01-10 PROCEDURE — 85730 THROMBOPLASTIN TIME PARTIAL: CPT

## 2023-01-10 PROCEDURE — 84100 ASSAY OF PHOSPHORUS: CPT

## 2023-01-10 PROCEDURE — 84443 ASSAY THYROID STIM HORMONE: CPT

## 2023-01-10 PROCEDURE — 83690 ASSAY OF LIPASE: CPT

## 2023-01-10 PROCEDURE — 80076 HEPATIC FUNCTION PANEL: CPT

## 2023-01-10 PROCEDURE — 83735 ASSAY OF MAGNESIUM: CPT

## 2023-01-10 PROCEDURE — 82962 GLUCOSE BLOOD TEST: CPT

## 2023-01-10 PROCEDURE — 83605 ASSAY OF LACTIC ACID: CPT

## 2023-01-10 PROCEDURE — 87150 DNA/RNA AMPLIFIED PROBE: CPT

## 2023-01-10 PROCEDURE — 86850 RBC ANTIBODY SCREEN: CPT

## 2023-01-10 PROCEDURE — 83036 HEMOGLOBIN GLYCOSYLATED A1C: CPT

## 2023-01-10 PROCEDURE — 0225U NFCT DS DNA&RNA 21 SARSCOV2: CPT

## 2023-01-10 PROCEDURE — 85014 HEMATOCRIT: CPT

## 2023-01-10 PROCEDURE — 82435 ASSAY OF BLOOD CHLORIDE: CPT

## 2023-01-10 PROCEDURE — 85025 COMPLETE CBC W/AUTO DIFF WBC: CPT

## 2023-01-10 PROCEDURE — 81001 URINALYSIS AUTO W/SCOPE: CPT

## 2023-01-10 PROCEDURE — A9585: CPT

## 2023-01-10 PROCEDURE — 71045 X-RAY EXAM CHEST 1 VIEW: CPT

## 2023-01-10 PROCEDURE — 80048 BASIC METABOLIC PNL TOTAL CA: CPT

## 2023-01-10 PROCEDURE — 86803 HEPATITIS C AB TEST: CPT

## 2023-01-10 PROCEDURE — U0005: CPT

## 2023-01-10 PROCEDURE — C1889: CPT

## 2023-01-10 PROCEDURE — 80053 COMPREHEN METABOLIC PANEL: CPT

## 2023-01-10 PROCEDURE — 99232 SBSQ HOSP IP/OBS MODERATE 35: CPT

## 2023-01-10 PROCEDURE — 74330 X-RAY BILE/PANC ENDOSCOPY: CPT

## 2023-01-10 PROCEDURE — 86901 BLOOD TYPING SEROLOGIC RH(D): CPT

## 2023-01-10 PROCEDURE — 71275 CT ANGIOGRAPHY CHEST: CPT

## 2023-01-10 PROCEDURE — 76705 ECHO EXAM OF ABDOMEN: CPT

## 2023-01-10 PROCEDURE — 74183 MRI ABD W/O CNTR FLWD CNTR: CPT

## 2023-01-10 PROCEDURE — 93005 ELECTROCARDIOGRAM TRACING: CPT

## 2023-01-10 PROCEDURE — 82947 ASSAY GLUCOSE BLOOD QUANT: CPT

## 2023-01-10 PROCEDURE — 82803 BLOOD GASES ANY COMBINATION: CPT

## 2023-01-10 PROCEDURE — 87186 SC STD MICRODIL/AGAR DIL: CPT

## 2023-01-10 PROCEDURE — 99285 EMERGENCY DEPT VISIT HI MDM: CPT

## 2023-01-10 PROCEDURE — 84132 ASSAY OF SERUM POTASSIUM: CPT

## 2023-01-10 PROCEDURE — 84295 ASSAY OF SERUM SODIUM: CPT

## 2023-01-10 PROCEDURE — 82330 ASSAY OF CALCIUM: CPT

## 2023-01-10 RX ORDER — APIXABAN 2.5 MG/1
1 TABLET, FILM COATED ORAL
Qty: 60 | Refills: 0
Start: 2023-01-10 | End: 2023-02-08

## 2023-01-10 RX ORDER — AMIODARONE HYDROCHLORIDE 400 MG/1
1 TABLET ORAL
Qty: 14 | Refills: 0
Start: 2023-01-10 | End: 2023-01-23

## 2023-01-10 RX ORDER — METOPROLOL TARTRATE 50 MG
1 TABLET ORAL
Qty: 60 | Refills: 0
Start: 2023-01-10 | End: 2023-02-08

## 2023-01-10 RX ORDER — OXYCODONE HYDROCHLORIDE 5 MG/1
1 TABLET ORAL
Qty: 0 | Refills: 0 | DISCHARGE
Start: 2023-01-10

## 2023-01-10 RX ORDER — CHLORTHALIDONE 50 MG
1 TABLET ORAL
Qty: 0 | Refills: 0 | DISCHARGE

## 2023-01-10 RX ORDER — METOPROLOL TARTRATE 50 MG
1 TABLET ORAL
Qty: 0 | Refills: 0 | DISCHARGE
Start: 2023-01-10

## 2023-01-10 RX ADMIN — Medication 1: at 12:51

## 2023-01-10 RX ADMIN — APIXABAN 5 MILLIGRAM(S): 2.5 TABLET, FILM COATED ORAL at 18:41

## 2023-01-10 RX ADMIN — Medication 650 MILLIGRAM(S): at 12:51

## 2023-01-10 RX ADMIN — Medication 50 MILLIGRAM(S): at 18:41

## 2023-01-10 RX ADMIN — PIPERACILLIN AND TAZOBACTAM 25 GRAM(S): 4; .5 INJECTION, POWDER, LYOPHILIZED, FOR SOLUTION INTRAVENOUS at 04:57

## 2023-01-10 RX ADMIN — Medication 1: at 08:25

## 2023-01-10 RX ADMIN — Medication 650 MILLIGRAM(S): at 06:53

## 2023-01-10 RX ADMIN — AMIODARONE HYDROCHLORIDE 400 MILLIGRAM(S): 400 TABLET ORAL at 04:57

## 2023-01-10 RX ADMIN — Medication 650 MILLIGRAM(S): at 13:50

## 2023-01-10 RX ADMIN — Medication 10 MILLIGRAM(S): at 04:56

## 2023-01-10 RX ADMIN — Medication 650 MILLIGRAM(S): at 18:42

## 2023-01-10 RX ADMIN — DEXTROSE MONOHYDRATE, SODIUM CHLORIDE, AND POTASSIUM CHLORIDE 75 MILLILITER(S): 50; .745; 4.5 INJECTION, SOLUTION INTRAVENOUS at 02:34

## 2023-01-10 RX ADMIN — DEXTROSE MONOHYDRATE, SODIUM CHLORIDE, AND POTASSIUM CHLORIDE 75 MILLILITER(S): 50; .745; 4.5 INJECTION, SOLUTION INTRAVENOUS at 08:26

## 2023-01-10 RX ADMIN — AMIODARONE HYDROCHLORIDE 400 MILLIGRAM(S): 400 TABLET ORAL at 18:42

## 2023-01-10 RX ADMIN — Medication 650 MILLIGRAM(S): at 04:57

## 2023-01-10 RX ADMIN — APIXABAN 5 MILLIGRAM(S): 2.5 TABLET, FILM COATED ORAL at 04:58

## 2023-01-10 RX ADMIN — PIPERACILLIN AND TAZOBACTAM 25 GRAM(S): 4; .5 INJECTION, POWDER, LYOPHILIZED, FOR SOLUTION INTRAVENOUS at 13:48

## 2023-01-10 RX ADMIN — Medication 50 MILLIGRAM(S): at 04:57

## 2023-01-10 NOTE — PROGRESS NOTE ADULT - ASSESSMENT
68 year old male with PMHx significant for DM, HTN, and pancreatitis, presenting with nausea, vomiting, fevers, and chills of the past day.     RVP (1/3) Negative  UCx (1/4) No Growth  BCx (1/3) Enterococcus  BCx (1/5) NGTD    CT A/P (1/3) No acute abdominopelvic abnormality.  RUQ US (1/4) Cholelithiasis with equivocal sonographic evidence of acute cholecystitis.  MRCP (1/4) Cholelithiasis without choledocholithiasis. Dilatation of the CBD measuring up to 1.1 cm.    On 1/4 s/p ERCP with evidence of Ascending cholangitis with Sludge, stones, and pus were completely removed from the dilated common bile duct with balloon sweeps.    On 1/5 s/p Laparoscopic cholecystectomy    Leukocytosis now improving and examination improving; reasonable to discharge with plan for 7 more days of Augmentin on discharge.     Antibiotic Course:   Zosyn: 1/4 -->    #Enterococcus Bacteremia, Cholangitis, Leukocytosis, Fever  --Recommend discharge with Augmentin 875 mg PO Q12H to complete 7 more days  --Patient should follow up with me in the Infectious Diseases Office at [address] [place] [address] in 2-3 weeks time. Will pursue surveillance blood cultures as outpatient. Office contact number is [phone]  --Patient recommended to pursue BERNARDO but patient refuses at this time and wishes to pursue as outpatient.     I will sign off at this time. Please feel free to contact me with any further questions or concerns.    Anish Enriquez M.D.  [facility] Division of Infectious Disease  8AM-5PM Monday - Friday: Available on [employer] Teams  After Hours and Holidays (or if no response on [employer] Teams): Please contact the Infectious Diseases Office at [phone]     The above assessment and plan were discussed with Dr Lopez

## 2023-01-10 NOTE — CONSULT NOTE ADULT - NS ATTEND AMEND GEN_ALL_CORE FT
Patient seen and examined.  Agree with above.   On ac with eliquis for paf and cva prevention   Check BERNARDO to assess MR  Further workup pending above and structural heart workup     Kaylen Mcfarlane MD

## 2023-01-10 NOTE — CONSULT NOTE ADULT - PROBLEM SELECTOR RECOMMENDATION 4
Patient is currently in SR. He did note he felt it when he was in AFib. Continue with Amiodarone and metoprolol at current dosage.

## 2023-01-10 NOTE — PROGRESS NOTE ADULT - SUBJECTIVE AND OBJECTIVE BOX
RED SURGERY DAILY PROGRESS NOTE    SUBJECTIVE:  Patient seen and examined at bedside. No overnight events. Patient feeling well.    Vital Signs Last 24 Hrs  T(C): 36.6 (10 Mitchell 2023 04:56), Max: 36.7 (09 Jan 2023 12:22)  T(F): 97.9 (10 Mitchell 2023 04:56), Max: 98.1 (09 Jan 2023 12:22)  HR: 60 (10 Mitchell 2023 04:56) (57 - 71)  BP: 165/90 (10 Mitchell 2023 04:56) (121/72 - 175/94)  BP(mean): --  RR: 16 (10 Mitchell 2023 04:56) (16 - 18)  SpO2: 96% (10 Mitchell 2023 04:56) (93% - 96%)    Parameters below as of 10 Mitchell 2023 04:56  Patient On (Oxygen Delivery Method): room air      I&Os    01-09-23 @ 07:01  -  01-10-23 @ 07:00  --------------------------------------------------------  IN: 2690 mL / OUT: 70 mL / NET: 2620 mL      PHYSICAL EXAM:  GENERAL: NAD, resting comfortably   HEAD:  Atraumatic, Normocephalic  CHEST/LUNG: Nonlabored.   HEART: RRR  ABDOMEN: Incisions clean, dry and intact. ASTER with SS output. Soft, nondistended. Nontender.   EXT: Warm, well perfused.  NEUROLOGY: responding appropriately    MEDICATIONS:  acetaminophen     Tablet .. 650 milliGRAM(s) Oral every 6 hours  aMIOdarone    Tablet 400 milliGRAM(s) Oral two times a day  apixaban 5 milliGRAM(s) Oral every 12 hours  atorvastatin 10 milliGRAM(s) Oral at bedtime  dextrose 5% + sodium chloride 0.45% with potassium chloride 20 mEq/L 1000 milliLiter(s) IV Continuous <Continuous>  dextrose 5%. 1000 milliLiter(s) IV Continuous <Continuous>  dextrose 5%. 1000 milliLiter(s) IV Continuous <Continuous>  dextrose 50% Injectable 25 Gram(s) IV Push once  dextrose 50% Injectable 12.5 Gram(s) IV Push once  dextrose 50% Injectable 25 Gram(s) IV Push once  dextrose Oral Gel 15 Gram(s) Oral once PRN  enalapril 10 milliGRAM(s) Oral daily  glucagon  Injectable 1 milliGRAM(s) IntraMuscular once  insulin lispro (ADMELOG) corrective regimen sliding scale   SubCutaneous at bedtime  insulin lispro (ADMELOG) corrective regimen sliding scale   SubCutaneous three times a day before meals  lidocaine   4% Patch 1 Patch Transdermal every 24 hours  metoprolol tartrate 50 milliGRAM(s) Oral two times a day  oxyCODONE    IR 2.5 milliGRAM(s) Oral every 4 hours PRN  oxyCODONE    IR 5 milliGRAM(s) Oral every 4 hours PRN  piperacillin/tazobactam IVPB.. 3.375 Gram(s) IV Intermittent every 8 hours      LABS:                        13.4   13.65 )-----------( 340      ( 10 Mitchell 2023 05:29 )             39.8     01-10    137  |  102  |  21  ----------------------------<  173<H>  4.1   |  22  |  1.28    Ca    9.3      10 Mitchell 2023 05:27  Phos  2.8     01-10  Mg     2.1     01-10    TPro  7.1  /  Alb  3.4  /  TBili  0.9  /  DBili  x   /  AST  25  /  ALT  70<H>  /  AlkPhos  70  01-10

## 2023-01-10 NOTE — PROGRESS NOTE ADULT - NS ATTEND AMEND GEN_ALL_CORE FT
DATE OF SERVICE: 01-06-23 @ 12:24    Seen and examined. POD 1 s/p lap carlene  Feeling well, mild incisional pain  Tolerating diet    Lab sstable, LFTs downtrending    Abdomen soft mild incisional TTP, non distended, ASTER serosanguinous    Ok for DC home today  Diet as tolerated  Pain control  Will remove ASTER in office  Continue abx after Cholangitis
DATE OF SERVICE: 01-10-23 @ 12:56    Seen and examined  WBC 13 today, no abdominal pain, afebrile    Abdomen soft NT/ND; ASTER SS    No plan for imaging at this time given normal exam, normal vitals, downtrending WBC  Convert to PO abx for DC per ID  Cardiology following, pending BERNARDO, possibly as outpatient?  Diet as tolerated  DC planning pending BERNARDO
c/w amio. Will need periodic PFTs, TFTs, LFTs, and opthalmologic assessments.
Pt care and plan discussed and reviewed with PA. Plan as outlined above edited by me to reflect our discussion. Advanced care planning/advanced directives discussed with patient/family. DNR status including forceful chest compressions to attempt to restart the heart, ventilator support/artificial breathing, electric shock, artificial nutrition, health care proxy, Molst form all discussed with pt. Pt wishes to consider.
Pt care and plan discussed and reviewed with PA. Plan as outlined above edited by me to reflect our discussion. Advanced care planning/advanced directives discussed with patient/family. DNR status including forceful chest compressions to attempt to restart the heart, ventilator support/artificial breathing, electric shock, artificial nutrition, health care proxy, Molst form all discussed with pt. Pt wishes to consider.

## 2023-01-10 NOTE — DISCHARGE NOTE NURSING/CASE MANAGEMENT/SOCIAL WORK - NSDCPEFALRISK_GEN_ALL_CORE
For information on Fall & Injury Prevention, visit: [email or web address] OR  [email or web address] OR  https://www.cdc.gov/steadi/patient.html

## 2023-01-10 NOTE — PROGRESS NOTE ADULT - SUBJECTIVE AND OBJECTIVE BOX
Follow Up:      Interval History:    REVIEW OF SYSTEMS  [  ] ROS unobtainable because:    [  ] All other systems negative except as noted below    Constitutional:  [ ] fever [ ] chills  [ ] weight loss  [ ] weakness  Skin:  [ ] rash [ ] phlebitis	  Eyes: [ ] icterus [ ] pain  [ ] discharge	  ENMT: [ ] sore throat  [ ] thrush [ ] ulcers [ ] exudates  Respiratory: [ ] dyspnea [ ] hemoptysis [ ] cough [ ] sputum	  Cardiovascular:  [ ] chest pain [ ] palpitations [ ] edema	  Gastrointestinal:  [ ] nausea [ ] vomiting [ ] diarrhea [ ] constipation [ ] pain	  Genitourinary:  [ ] dysuria [ ] frequency [ ] hematuria [ ] discharge [ ] flank pain  [ ] incontinence  Musculoskeletal:  [ ] myalgias [ ] arthralgias [ ] arthritis  [ ] back pain  Neurological:  [ ] headache [ ] seizures  [ ] confusion/altered mental status    Allergies  No Known Allergies        ANTIMICROBIALS:  piperacillin/tazobactam IVPB.. 3.375 every 8 hours      OTHER MEDS:  MEDICATIONS  (STANDING):  acetaminophen     Tablet .. 650 every 6 hours  aMIOdarone    Tablet 400 two times a day  apixaban 5 every 12 hours  atorvastatin 10 at bedtime  dextrose 50% Injectable 25 once  dextrose 50% Injectable 12.5 once  dextrose 50% Injectable 25 once  dextrose Oral Gel 15 once PRN  enalapril 10 daily  glucagon  Injectable 1 once  insulin lispro (ADMELOG) corrective regimen sliding scale  at bedtime  insulin lispro (ADMELOG) corrective regimen sliding scale  three times a day before meals  metoprolol tartrate 50 two times a day  oxyCODONE    IR 5 every 4 hours PRN  oxyCODONE    IR 2.5 every 4 hours PRN      Vital Signs Last 24 Hrs  T(C): 37.1 (10 Mitchell 2023 12:34), Max: 37.1 (10 Mitchell 2023 12:34)  T(F): 98.8 (10 Mitchell 2023 12:34), Max: 98.8 (10 Mitchell 2023 12:34)  HR: 59 (10 Mitchell 2023 12:34) (57 - 60)  BP: 169/95 (10 Mitchell 2023 12:34) (121/72 - 169/95)  BP(mean): --  RR: 16 (10 Mitchell 2023 12:34) (16 - 18)  SpO2: 96% (10 Mitchell 2023 12:34) (93% - 96%)    Parameters below as of 10 Mitchell 2023 12:34  Patient On (Oxygen Delivery Method): room air        PHYSICAL EXAMINATION:  General: Alert and Awake, NAD  HEENT: PERRL, EOMI  Neck: Supple  Cardiac: RRR, No M/R/G  Resp: CTAB, No Wh/Rh/Ra  Abdomen: NBS, NT/ND, No HSM, No rigidity or guarding  MSK: No LE edema. No Calf tenderness  : No tanner  Skin: No rashes or lesions. Skin is warm and dry to the touch.   Neuro: Alert and Awake. CN 2-12 Grossly intact. Moves all four extremities spontaneously.  Psych: Calm, Pleasant, Cooperative                          13.4   13.65 )-----------( 340      ( 10 Mitchell 2023 05:29 )             39.8       01-10    137  |  102  |  21  ----------------------------<  173<H>  4.1   |  22  |  1.28    Ca    9.3      10 Mitchell 2023 05:27  Phos  2.8     01-10  Mg     2.1     01-10    TPro  7.1  /  Alb  3.4  /  TBili  0.9  /  DBili  x   /  AST  25  /  ALT  70<H>  /  AlkPhos  70  01-10          MICROBIOLOGY:  v  .Blood Blood-Peripheral  01-05-23   No growth to date.  --  --      .Blood Blood-Peripheral  01-05-23   No growth to date.  --  --      Clean Catch Clean Catch (Midstream)  01-04-23   No growth  --  --      .Blood Blood-Peripheral  01-03-23   Growth in anaerobic bottle: Enterococcus gallinarum  ***Blood Panel PCR results on this specimen are available  approximately 3 hours after the Gram stain result.***  Gram stain, PCR, and/or culture results may not always  correspond due to difference in methodologies.  ************************************************************  This PCR assay was performed by multiplex PCR. This  Assay tests for 66 bacterial and resistance gene targets.  Please refer to the [facility] test directory  at [email or web address] for details.  --  Blood Culture PCR  Enterococcus gallinarum      .Blood Blood-Peripheral  01-03-23   No Growth Final  --  --          Rapid RVP Result: Heavenly (01-03 @ 22:13)        RADIOLOGY:    <The imaging below has been reviewed and visualized by me independently. Findings as detailed in report below> Follow Up:  Bacteremia    Interval History: afebrile. minimal RUQ pain.     REVIEW OF SYSTEMS  [  ] ROS unobtainable because:    [ x ] All other systems negative except as noted below:	    Constitutional:  [ ] fever [ ] chills  [ ] weight loss  [ ] weakness  Skin:  [ ] rash [ ] phlebitis	  Eyes: [ ] icterus [ ] pain  [ ] discharge	  ENMT: [ ] sore throat  [ ] thrush [ ] ulcers [ ] exudates  Respiratory: [ ] dyspnea [ ] hemoptysis [ ] cough [ ] sputum	  Cardiovascular:  [ ] chest pain [ ] palpitations [ ] edema	  Gastrointestinal:  [ ] nausea [ ] vomiting [ ] diarrhea [ ] constipation [ x] pain	  Genitourinary:  [ ] dysuria [ ] frequency [ ] hematuria [ ] discharge [ ] flank pain  [ ] incontinence  Musculoskeletal:  [ ] myalgias [ ] arthralgias [ ] arthritis  [ ] back pain  Neurological:  [ ] headache [ ] seizures  [ ] confusion/altered mental status  Psychiatric:  [ ] anxiety [ ] depression	    Allergies  No Known Allergies        ANTIMICROBIALS:  piperacillin/tazobactam IVPB.. 3.375 every 8 hours      OTHER MEDS:  MEDICATIONS  (STANDING):  acetaminophen     Tablet .. 650 every 6 hours  aMIOdarone    Tablet 400 two times a day  apixaban 5 every 12 hours  atorvastatin 10 at bedtime  dextrose 50% Injectable 25 once  dextrose 50% Injectable 12.5 once  dextrose 50% Injectable 25 once  dextrose Oral Gel 15 once PRN  enalapril 10 daily  glucagon  Injectable 1 once  insulin lispro (ADMELOG) corrective regimen sliding scale  at bedtime  insulin lispro (ADMELOG) corrective regimen sliding scale  three times a day before meals  metoprolol tartrate 50 two times a day  oxyCODONE    IR 5 every 4 hours PRN  oxyCODONE    IR 2.5 every 4 hours PRN      Vital Signs Last 24 Hrs  T(C): 37.1 (10 Mitchell 2023 12:34), Max: 37.1 (10 Mitchell 2023 12:34)  T(F): 98.8 (10 Mitchell 2023 12:34), Max: 98.8 (10 Mitchell 2023 12:34)  HR: 59 (10 Mitchell 2023 12:34) (57 - 60)  BP: 169/95 (10 Mitchell 2023 12:34) (121/72 - 169/95)  BP(mean): --  RR: 16 (10 Mitchell 2023 12:34) (16 - 18)  SpO2: 96% (10 Mitchell 2023 12:34) (93% - 96%)    Parameters below as of 10 Mitchell 2023 12:34  Patient On (Oxygen Delivery Method): room air    PHYSICAL EXAMINATION:  General: Alert and Awake, NAD  HEENT: Normocephalic / Atraumatic  Resp: No accessory muscles of respiration utilized  Abdomen: NBS, Mild RUQ tenderness to palpation, ND  MSK: No LE edema.   : No tanner  Skin: No rashes or lesions.    Neuro: Alert and Awake. CN 2-12 Grossly intact. Moves all four extremities spontaneously.  Psych: Calm, Pleasant, Cooperative                          13.4   13.65 )-----------( 340      ( 10 Mitchell 2023 05:29 )             39.8       01-10    137  |  102  |  21  ----------------------------<  173<H>  4.1   |  22  |  1.28    Ca    9.3      10 Mitchell 2023 05:27  Phos  2.8     01-10  Mg     2.1     01-10    TPro  7.1  /  Alb  3.4  /  TBili  0.9  /  DBili  x   /  AST  25  /  ALT  70<H>  /  AlkPhos  70  01-10          MICROBIOLOGY:  v  .Blood Blood-Peripheral  01-05-23   No growth to date.  --  --      .Blood Blood-Peripheral  01-05-23   No growth to date.  --  --      Clean Catch Clean Catch (Midstream)  01-04-23   No growth  --  --      .Blood Blood-Peripheral  01-03-23   Growth in anaerobic bottle: Enterococcus gallinarum  ***Blood Panel PCR results on this specimen are available  approximately 3 hours after the Gram stain result.***  Gram stain, PCR, and/or culture results may not always  correspond due to difference in methodologies.  ************************************************************  This PCR assay was performed by multiplex PCR. This  Assay tests for 66 bacterial and resistance gene targets.  Please refer to the [facility] test directory  at [email or web address] for details.  --  Blood Culture PCR  Enterococcus gallinarum      .Blood Blood-Peripheral  01-03-23   No Growth Final  --  --          Rapid RVP Result: NotDetec (01-03 @ 22:13)        RADIOLOGY:    <The imaging below has been reviewed and visualized by me independently. Findings as detailed in report below>    < from: CT Angio Chest PE Protocol w/ IV Cont (01.07.23 @ 21:01) >  IMPRESSION:    1.  No pulmonary thromboembolism    2.  Nonspecific ill-defined groundglass density in the posterior right   upper lobe. Reassessment can be made on follow-up imaging.    < end of copied text >

## 2023-01-10 NOTE — CONSULT NOTE ADULT - SUBJECTIVE AND OBJECTIVE BOX
Structural Heart Team    HPI:  Patient is a 68 year old male with PMHx significant for DM, HTN, and pancreatitis, presenting with nausea, vomiting, fevers, and chills of the past day. Symptoms started yesterday evening. Denies abdominal pain. Reports passing flatus and having BMs. Has a history of choledocholithiasis about 1 year ago for which he had an ERCP at that time; however, declined cholecystectomy at that time.    In ED, patient febrile and found to have elevated LFTs + elevated T. Bili. RUQ US in ED demonstrating gallstones. He had an ERCP and Lap Flakita done on 1/4 and 1/5 respectively, and is also being treated for enterococcus bacteremia. Post operatively he had an episode of new onset Atrial Fibrillation which converted after being medically treated. TTE shows moderate to severe MR.    Prior to admission, the patient denied any cardiac related symptoms. He states though he isn't as active as he should be, his usual activities do not cause him any SOB, CP, or pedal edema. 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 He ambulates and walks 1-2 blocks without issue, but does have sciatica which at times limits his mobility. He lives at home with his wife, and is independent in his ADL's. He never smoked.     STS MVR 3.9%/ MV Repair 1.99%              01-09 @ 07:01  -  01-10 @ 07:00  --------------------------------------------------------  IN: 2690 mL / OUT: 70 mL / NET: 2620 mL        PAST MEDICAL & SURGICAL HISTORY:  Back injury      H/O acute pancreatitis      Diabetes      No significant past surgical history          FAMILY HISTORY:            No Known Allergies    acetaminophen     Tablet .. 650 milliGRAM(s) Oral every 6 hours  aMIOdarone    Tablet 400 milliGRAM(s) Oral two times a day  apixaban 5 milliGRAM(s) Oral every 12 hours  atorvastatin 10 milliGRAM(s) Oral at bedtime  enalapril 10 milliGRAM(s) Oral daily  glucagon  Injectable 1 milliGRAM(s) IntraMuscular once  insulin lispro (ADMELOG) corrective regimen sliding scale   SubCutaneous at bedtime  insulin lispro (ADMELOG) corrective regimen sliding scale   SubCutaneous three times a day before meals  lidocaine   4% Patch 1 Patch Transdermal every 24 hours  metoprolol tartrate 50 milliGRAM(s) Oral two times a day  piperacillin/tazobactam IVPB.. 3.375 Gram(s) IV Intermittent every 8 hours      T(C): 36.6 (01-10-23 @ 04:56), Max: 36.7 (01-09-23 @ 12:22)  HR: 60 (01-10-23 @ 04:56) (57 - 71)  BP: 165/90 (01-10-23 @ 04:56) (121/72 - 175/94)  RR: 16 (01-10-23 @ 04:56) (16 - 18)  SpO2: 96% (01-10-23 @ 04:56) (93% - 96%)  Wt(kg): --      Review of Symptoms:  General: Alert, Follows commands  Respiratory: Denies  Cardiac: Denies CP, Denies Palpitations  Gastrointestinal: Denies Pain at this time, Denies N/V  Extremities: Denies Pedal Edema, No Joint pain  Vascular: Negative  Neurological: Negative  Endocrine: negative      Physical Exam:  Gen: A/Ox3, NAD  HEENT: No JVD, Neck Supple, Trachea midline, No masses  Pulmonary: CTAB,  No accessory muscle use for respiration  Cardiac: S1S2, RRR,  II/VI ANNEL at LUSB, no murmur noted at apex, II/VI RUSB  No Gallops/Rubs  ECG: SR  Gastrointestinal: Soft, + Buld drain, mildly tender , + Bowel Sounds  Extremities:  No Edema,  No joint pain or swelling +PMSx4  Vascular: 1+ pulses B/L, No Bruits  Neurological: Non Focal, = motor and sensory      Laboratory:                        13.4   13.65 )-----------( 340      ( 10 Mitchell 2023 05:29 )             39.8    01-10    137  |  102  |  21  ----------------------------<  173<H>  4.1   |  22  |  1.28    Ca    9.3      10 Mitchell 2023 05:27  Phos  2.8     01-10  Mg     2.1     01-10    TPro  7.1  /  Alb  3.4  /  TBili  0.9  /  DBili  x   /  AST  25  /  ALT  70<H>  /  AlkPhos  70  01-10   PT/INR - ( 09 Jan 2023 08:50 )   PT: 15.4 sec;   INR: 1.32 ratio         PTT - ( 09 Jan 2023 08:50 )  PTT:37.5 sec    Pro-BNP:      Transthoracic Echo:  < from: TTE with Doppler (w/Cont) (01.09.23 @ 09:23) >  EF (Visual Estimate): 50-55 %  Doppler Peak Velocity (m/sec): AoV=1.3  ------------------------------------------------------------------------  Observations:  Mitral Valve: Mitral valve is not well visualized. The  severity of  mitral regurgitation is not well visualized.  It is probably at least moderate-severe and it is  horizontal in direction. Due to this appearance, there is  likely a flail scallop.  Aortic Valve/Aorta: Normal trileaflet aortic valve. Peak  transaortic valve gradient equals 7 mm Hg. Minimal aortic  regurgitation.  Peak left ventricular outflow tract  gradient equals 3 mm Hg, mean gradient is equal to 2 mm Hg,  LVOT velocity time integral equals 18 cm.  Aortic Root: 3.5 cm.  Ascending Aorta: 3.4 cm.  LVOT diameter: 2.1 cm.  Left Atrium: Mildly dilated left atrium.  LA volume index =  38 cc/m2.  Left Ventricle: Endocardial visualization enhanced with  intravenous injection of Ultrasonic Enhancing Agent  (Definity). Hypokinesis of the anteroseptum, septum, and  inferolateral wall. Visually, LVEF is 50-55%. Low normal  left ventricular systolic function. Normal left ventricular  internal dimensions and wall thicknesses. Moderate  diastolic dysfunction (Stage II). Increased E/e'  is  consistent with elevated left ventricular filling pressure.  Right Heart: Normal right atrium. Normal right ventricular  size and function.Normal tricuspid valve. Mild tricuspid  regurgitation. Normal pulmonic valve. Mild pulmonic  regurgitation.  Pericardium/Pleura: No pericardial effusion seen.  Hemodynamic: Estimated right ventricular systolic pressure  equals 17 mm Hg, assuming right atrial pressure equals 3 mm  Hg, consistent with normal pulmonary pressures.  ------------------------------------------------------------------------  Conclusions:  1. Mitral valve is not well visualized. The severity of  mitral regurgitation is not well visualized. It is probably  at least moderate-severe and it is horizontal in direction.  Due to this appearance, there is likely a flail scallop.  Would suggest BERNARDO for further evaluation of the mitral  valve.  2. Normal trileaflet aortic valve.  3. Mildly dilated left atrium.  LA volume index = 38  cc/m2.Endocardial visualization enhanced with intravenous  injection of Ultrasonic Enhancing Agent (Definity).  Hypokinesis of the anteroseptum, septum, and inferolateral  wall. Visually, LVEF is 50-55%. Low normal left ventricular  systolic function.  Moderate diastolic dysfunction (Stage II). Increased E/e'  is consistent with elevated left ventricular filling  pressure.  4. Normal right atrium.Normal right ventricular size and  function.  5. Normal tricuspid valve. Mild tricuspid  regurgitation.Estimated pulmonary artery systolic pressure  equals 17 mm Hg, assuming right atrial pressure equals 3  mm Hg, consistent with normal pulmonary pressures.  6. No pericardial effusion seen.  ***No previous Echo exam.    < end of copied text >

## 2023-01-10 NOTE — PROGRESS NOTE ADULT - PROVIDER SPECIALTY LIST ADULT
Infectious Disease
Surgery
Infectious Disease
Surgery
Electrophysiology
Gastroenterology
Infectious Disease
Internal Medicine
Surgery
Electrophysiology
Internal Medicine
Surgery

## 2023-01-10 NOTE — CONSULT NOTE ADULT - SUBJECTIVE AND OBJECTIVE BOX
C A R D I O L O G Y  *********************    DATE OF SERVICE: 01-10-23    HISTORY OF PRESENT ILLNESS: HPI:  Patient is a 67 y/o male with PMH of DM, HTN, and pancreatitis who presented with nausea, vomiting, fevers, and chills admitted with enterococcus bacteremia, cholangitis 2/2 choledocholithiasis now s/p lap cholecystectomy with course c/b new onset PAF and echo with mod-severe MR. Cardiology consulted for further evaluation. Patient feeling much better. Denies exertional chest pain or SOB. No LE edema or decreased exercise tolerance. Denies chest pain, SOB, palpitations, dizziness, or syncope.     PAST MEDICAL & SURGICAL HISTORY:  Back injury      H/O acute pancreatitis      Diabetes      No significant past surgical history      MEDICATIONS:  MEDICATIONS  (STANDING):  acetaminophen     Tablet .. 650 milliGRAM(s) Oral every 6 hours  aMIOdarone    Tablet 400 milliGRAM(s) Oral two times a day  apixaban 5 milliGRAM(s) Oral every 12 hours  atorvastatin 10 milliGRAM(s) Oral at bedtime  dextrose 5%. 1000 milliLiter(s) (50 mL/Hr) IV Continuous <Continuous>  dextrose 5%. 1000 milliLiter(s) (100 mL/Hr) IV Continuous <Continuous>  dextrose 50% Injectable 25 Gram(s) IV Push once  dextrose 50% Injectable 12.5 Gram(s) IV Push once  dextrose 50% Injectable 25 Gram(s) IV Push once  enalapril 10 milliGRAM(s) Oral daily  glucagon  Injectable 1 milliGRAM(s) IntraMuscular once  insulin lispro (ADMELOG) corrective regimen sliding scale   SubCutaneous at bedtime  insulin lispro (ADMELOG) corrective regimen sliding scale   SubCutaneous three times a day before meals  lidocaine   4% Patch 1 Patch Transdermal every 24 hours  metoprolol tartrate 50 milliGRAM(s) Oral two times a day  piperacillin/tazobactam IVPB.. 3.375 Gram(s) IV Intermittent every 8 hours      Allergies    No Known Allergies    Intolerances        FAMILY HISTORY:    Non-contributary for premature coronary disease or sudden cardiac death    SOCIAL HISTORY:    [x ] Non-smoker  [ ] Smoker  [ ] Alcohol    FLU VACCINE THIS YEAR STARTS IN AUGUST:  [ ] Yes    [ ] No    IF OVER 65 HAVE YOU EVER HAD A PNA VACCINE:  [ ] Yes    [ ] No       [ ] N/A      REVIEW OF SYSTEMS:  [ ]chest pain  [  ]shortness of breath  [  ]palpitations  [  ]syncope  [ ]near syncope [ ]upper extremity weakness   [ ] lower extremity weakness  [  ]diplopia  [  ]altered mental status   [ x ]fevers  [x ]chills [ x]nausea  [ x]vomiting  [  ]dysphagia    [ ]abdominal pain  [ ]melena  [ ]BRBPR    [  ]epistaxis  [  ]rash    [ ]lower extremity edema        [X] All others negative	  [ ] Unable to obtain      LABS:	 	    CARDIAC MARKERS:                              13.4   13.65 )-----------( 340      ( 10 Mitchell 2023 05:29 )             39.8     Hb Trend: 13.4<--    01-10    137  |  102  |  21  ----------------------------<  173<H>  4.1   |  22  |  1.28    Ca    9.3      10 Mitchell 2023 05:27  Phos  2.8     01-10  Mg     2.1     01-10    TPro  7.1  /  Alb  3.4  /  TBili  0.9  /  DBili  x   /  AST  25  /  ALT  70<H>  /  AlkPhos  70  01-10    Creatinine Trend: 1.28<--, 1.35<--, 1.17<--, 1.14<--, 1.31<--, 1.52<--    Coags:      proBNP:   Lipid Profile:   HgA1c:   TSH:         PHYSICAL EXAM:  T(C): 36.6 (01-10-23 @ 04:56), Max: 36.7 (01-09-23 @ 21:58)  HR: 60 (01-10-23 @ 04:56) (57 - 71)  BP: 165/90 (01-10-23 @ 04:56) (121/72 - 175/94)  RR: 16 (01-10-23 @ 04:56) (16 - 18)  SpO2: 96% (01-10-23 @ 04:56) (93% - 96%)  Wt(kg): --   BMI (kg/m2): 31.9 (01-05-23 @ 12:36)  I&O's Summary    09 Jan 2023 07:01  -  10 Mitchell 2023 07:00  --------------------------------------------------------  IN: 2690 mL / OUT: 70 mL / NET: 2620 mL        Gen: NAD  HEENT:  (-)icterus (-)pallor  CV: N S1 S2 2/6 ANNEL (+)2 Pulses B/l  Resp:  Clear to auscultation B/L, normal effort  GI: (+) BS Soft, NT, ND  Lymph:  (-)Edema, (-)obvious lymphadenopathy  Skin: Warm to touch, Normal turgor  Psych: Appropriate mood and affect      TELEMETRY: SB/SR 55-70s	      ECG: NSR 	    < from: TTE with Doppler (w/Cont) (01.09.23 @ 09:23) >  Conclusions:  1. Mitral valve is not well visualized. The severity of  mitral regurgitation is not well visualized. It is probably  at least moderate-severe and it is horizontal in direction.  Due to this appearance, there is likely a flail scallop.  Would suggest BERNARDO for further evaluation of the mitral  valve.  2. Normal trileaflet aortic valve.  3. Mildly dilated left atrium.  LA volume index = 38  cc/m2.Endocardial visualization enhanced with intravenous  injection of Ultrasonic Enhancing Agent (Definity).  Hypokinesis of the anteroseptum, septum, and inferolateral  wall. Visually, LVEF is 50-55%. Low normal left ventricular  systolic function.  Moderate diastolic dysfunction (Stage II). Increased E/e'  is consistent with elevated left ventricular filling  pressure.  4. Normal right atrium.Normal right ventricular size and  function.  5. Normal tricuspid valve. Mild tricuspid  regurgitation.Estimated pulmonary artery systolic pressure  equals 17 mm Hg, assuming right atrial pressure equals 3  mm Hg, consistent with normal pulmonary pressures.  6. No pericardial effusion seen.  ***No previous Echo exam.    < end of copied text >      RADIOLOGY:         CXR: < from: Xray Chest 1 View- PORTABLE-Urgent (Xray Chest 1 View- PORTABLE-Urgent .) (01.07.23 @ 19:53) >  IMPRESSION:    Low lung volumes with crowded lung markings, but no focal consolidation.    --- End of Report ---    < end of copied text >      ASSESSMENT/PLAN: Patient is a 67 y/o male with PMH of DM, HTN, and pancreatitis who presented with nausea, vomiting, fevers, and chills admitted with enterococcus bacteremia, cholangitis 2/2 choledocholithiasis now s/p lap cholecystectomy with course c/b new onset PAF and echo with mod-severe MR. Cardiology consulted for further evaluation.    #Moderate-Severe Mitral Regurgitation  - Not in clinical HF  - Structural heart consult appreciated - Keep NPO after midnight for BERNARDO tomorrow if patient agreeable    #New Onset PAF  - EP eval appreciated  - Continue Amio to maintain NSR per EP - would consider short term use given age and potential side effects    #Enterococcus Bacteremia  - Abx per ID - repeat BCx negative  - BERNARDO pending which will also r/o endocarditis    #Cholangitis  - s/p ERCP and lap cholecystectomy  - Post op care per surgery  - Abx per ID    - Patient to f/u with his outpatient cardiologist Dr. Toribio Justice after discharge    Dwayne Allen PA-C  Pager: [phone]   C A R D I O L O G Y  *********************    DATE OF SERVICE: 01-10-23    HISTORY OF PRESENT ILLNESS: HPI:  Patient is a 69 y/o male with PMH of DM, HTN, and pancreatitis who presented with nausea, vomiting, fevers, and chills admitted with enterococcus bacteremia, cholangitis 2/2 choledocholithiasis now s/p lap cholecystectomy with course c/b new onset PAF and echo with mod-severe MR. Cardiology consulted for further evaluation. Patient feeling much better. Denies exertional chest pain or SOB. No LE edema or decreased exercise tolerance. Denies chest pain, SOB, palpitations, dizziness, or syncope.     PAST MEDICAL & SURGICAL HISTORY:  Back injury      H/O acute pancreatitis      Diabetes      No significant past surgical history      MEDICATIONS:  MEDICATIONS  (STANDING):  acetaminophen     Tablet .. 650 milliGRAM(s) Oral every 6 hours  aMIOdarone    Tablet 400 milliGRAM(s) Oral two times a day  apixaban 5 milliGRAM(s) Oral every 12 hours  atorvastatin 10 milliGRAM(s) Oral at bedtime  dextrose 5%. 1000 milliLiter(s) (50 mL/Hr) IV Continuous <Continuous>  dextrose 5%. 1000 milliLiter(s) (100 mL/Hr) IV Continuous <Continuous>  dextrose 50% Injectable 25 Gram(s) IV Push once  dextrose 50% Injectable 12.5 Gram(s) IV Push once  dextrose 50% Injectable 25 Gram(s) IV Push once  enalapril 10 milliGRAM(s) Oral daily  glucagon  Injectable 1 milliGRAM(s) IntraMuscular once  insulin lispro (ADMELOG) corrective regimen sliding scale   SubCutaneous at bedtime  insulin lispro (ADMELOG) corrective regimen sliding scale   SubCutaneous three times a day before meals  lidocaine   4% Patch 1 Patch Transdermal every 24 hours  metoprolol tartrate 50 milliGRAM(s) Oral two times a day  piperacillin/tazobactam IVPB.. 3.375 Gram(s) IV Intermittent every 8 hours      Allergies    No Known Allergies    Intolerances        FAMILY HISTORY:    Non-contributary for premature coronary disease or sudden cardiac death    SOCIAL HISTORY:    [x ] Non-smoker  [ ] Smoker  [ ] Alcohol    FLU VACCINE THIS YEAR STARTS IN AUGUST:  [ ] Yes    [ ] No    IF OVER 65 HAVE YOU EVER HAD A PNA VACCINE:  [ ] Yes    [ ] No       [ ] N/A      REVIEW OF SYSTEMS:  [ ]chest pain  [  ]shortness of breath  [  ]palpitations  [  ]syncope  [ ]near syncope [ ]upper extremity weakness   [ ] lower extremity weakness  [  ]diplopia  [  ]altered mental status   [ x ]fevers  [x ]chills [ x]nausea  [ x]vomiting  [  ]dysphagia    [ ]abdominal pain  [ ]melena  [ ]BRBPR    [  ]epistaxis  [  ]rash    [ ]lower extremity edema        [X] All others negative	  [ ] Unable to obtain      LABS:	 	    CARDIAC MARKERS:                              13.4   13.65 )-----------( 340      ( 10 Mitchell 2023 05:29 )             39.8     Hb Trend: 13.4<--    01-10    137  |  102  |  21  ----------------------------<  173<H>  4.1   |  22  |  1.28    Ca    9.3      10 Mitchell 2023 05:27  Phos  2.8     01-10  Mg     2.1     01-10    TPro  7.1  /  Alb  3.4  /  TBili  0.9  /  DBili  x   /  AST  25  /  ALT  70<H>  /  AlkPhos  70  01-10    Creatinine Trend: 1.28<--, 1.35<--, 1.17<--, 1.14<--, 1.31<--, 1.52<--    Coags:      proBNP:   Lipid Profile:   HgA1c:   TSH:         PHYSICAL EXAM:  T(C): 36.6 (01-10-23 @ 04:56), Max: 36.7 (01-09-23 @ 21:58)  HR: 60 (01-10-23 @ 04:56) (57 - 71)  BP: 165/90 (01-10-23 @ 04:56) (121/72 - 175/94)  RR: 16 (01-10-23 @ 04:56) (16 - 18)  SpO2: 96% (01-10-23 @ 04:56) (93% - 96%)  Wt(kg): --   BMI (kg/m2): 31.9 (01-05-23 @ 12:36)  I&O's Summary    09 Jan 2023 07:01  -  10 Mitchell 2023 07:00  --------------------------------------------------------  IN: 2690 mL / OUT: 70 mL / NET: 2620 mL        Gen: NAD  HEENT:  (-)icterus (-)pallor  CV: N S1 S2 2/6 ANNEL (+)2 Pulses B/l  Resp:  Clear to auscultation B/L, normal effort  GI: (+) BS Soft, NT, ND  Lymph:  (-)Edema, (-)obvious lymphadenopathy  Skin: Warm to touch, Normal turgor  Psych: Appropriate mood and affect      TELEMETRY: SB/SR 55-70s	      ECG: NSR 	    < from: TTE with Doppler (w/Cont) (01.09.23 @ 09:23) >  Conclusions:  1. Mitral valve is not well visualized. The severity of  mitral regurgitation is not well visualized. It is probably  at least moderate-severe and it is horizontal in direction.  Due to this appearance, there is likely a flail scallop.  Would suggest BERNARDO for further evaluation of the mitral  valve.  2. Normal trileaflet aortic valve.  3. Mildly dilated left atrium.  LA volume index = 38  cc/m2.Endocardial visualization enhanced with intravenous  injection of Ultrasonic Enhancing Agent (Definity).  Hypokinesis of the anteroseptum, septum, and inferolateral  wall. Visually, LVEF is 50-55%. Low normal left ventricular  systolic function.  Moderate diastolic dysfunction (Stage II). Increased E/e'  is consistent with elevated left ventricular filling  pressure.  4. Normal right atrium.Normal right ventricular size and  function.  5. Normal tricuspid valve. Mild tricuspid  regurgitation.Estimated pulmonary artery systolic pressure  equals 17 mm Hg, assuming right atrial pressure equals 3  mm Hg, consistent with normal pulmonary pressures.  6. No pericardial effusion seen.  ***No previous Echo exam.    < end of copied text >      RADIOLOGY:         CXR: < from: Xray Chest 1 View- PORTABLE-Urgent (Xray Chest 1 View- PORTABLE-Urgent .) (01.07.23 @ 19:53) >  IMPRESSION:    Low lung volumes with crowded lung markings, but no focal consolidation.    --- End of Report ---    < end of copied text >      ASSESSMENT/PLAN: Patient is a 69 y/o male with PMH of DM, HTN, and pancreatitis who presented with nausea, vomiting, fevers, and chills admitted with enterococcus bacteremia, cholangitis 2/2 choledocholithiasis now s/p lap cholecystectomy with course c/b new onset PAF and echo with low normal LV function, mod-severe MR. Cardiology consulted for further evaluation.    #Moderate-Severe Mitral Regurgitation  - Not in clinical HF  - Structural heart consult appreciated - Keep NPO after midnight for BERNARDO tomorrow if patient agreeable    #New Onset PAF  - EP eval appreciated  - Continue AC with Eliquis 5mg BID for stroke prevention  - Rate control with metoprolol 50mg BID to prevent AF RVR  - Continue Amio to maintain NSR per EP - would consider short term use given age and potential side effects  - TSH WNL    #Enterococcus Bacteremia  - Abx per ID - repeat BCx negative  - BERNARDO pending which will also r/o endocarditis    #Cholangitis  - s/p ERCP and lap cholecystectomy  - Post op care per surgery  - Abx per ID    - Patient to f/u with his outpatient cardiologist Dr. Toribio Justice after discharge    WERNER DohertyC  Pager: [phone]

## 2023-01-10 NOTE — PROGRESS NOTE ADULT - ASSESSMENT
68y Male s/p laparoscopic cholecystectomy 1/5/23 c/b new onset Afib w/ RVR transferred to [facility] for telemetry monitoring.     PLAN:  Continue cardiac monitoring on tele  Continue regular diet  Continue Zosyn. F/u ID recs for transition to PO abx upon discharge   Pain control PRN  Encourage IS, OOB/Ambulate as tolerated  ASTER drain care, record outputs  Appreciate EP recs; on Metoprolol BID, f/u amiodarone d/c plan. Outpt cardio f/u.   Continue [facility]     Red Team Surgery  [id number]

## 2023-01-10 NOTE — PROGRESS NOTE ADULT - REASON FOR ADMISSION
Choledocholithiasis

## 2023-01-10 NOTE — CONSULT NOTE ADULT - PROBLEM SELECTOR RECOMMENDATION 9
Patient had TTE which reveals Moderate to severe MR, with horizontal jet, possibly indicating flail leaflet. Patient states asymptomatic from a cardiac standpoint, and does not currently appear to be in Heart Failure. Would recommend BERNARDO to get better visualization of the Mitral valve to better understand the pathology. Patient is currently on Apixiban for new AFib. Discussed case with Dr. Larry and with Dr. Justice. I will place order for BERNARDO.

## 2023-01-10 NOTE — PROGRESS NOTE ADULT - SUBJECTIVE AND OBJECTIVE BOX
Name of Patient : JEFFREY WECHSLER  MRN: [id number]  Date of visit: 01-10-23 @ 11:59      Subjective: Patient seen and examined. No new events except as noted.   Patient seen earlier this AM. Lying down in bed. States that he feels well.   Reports his abdominal pain is controlled.  States he spoke with Cardiology and Structural heart this morning.   Offers no new complaints.     REVIEW OF SYSTEMS:    CONSTITUTIONAL: No weakness, fevers or chills  EYES/ENT: No visual changes;  No vertigo or throat pain   NECK: No pain or stiffness  RESPIRATORY: No cough, wheezing, hemoptysis; No shortness of breath  CARDIOVASCULAR: No chest pain or palpitations  GASTROINTESTINAL: +S/P lap choley; ASTER drain; No nausea, vomiting, or hematemesis; No diarrhea or constipation. No melena or hematochezia.  GENITOURINARY: No dysuria, frequency or hematuria  NEUROLOGICAL: No numbness or weakness  SKIN: No itching, burning, rashes, or lesions   All other review of systems is negative unless indicated above.    MEDICATIONS:  MEDICATIONS  (STANDING):  acetaminophen     Tablet .. 650 milliGRAM(s) Oral every 6 hours  aMIOdarone    Tablet 400 milliGRAM(s) Oral two times a day  apixaban 5 milliGRAM(s) Oral every 12 hours  atorvastatin 10 milliGRAM(s) Oral at bedtime  dextrose 5%. 1000 milliLiter(s) (50 mL/Hr) IV Continuous <Continuous>  dextrose 5%. 1000 milliLiter(s) (100 mL/Hr) IV Continuous <Continuous>  dextrose 50% Injectable 25 Gram(s) IV Push once  dextrose 50% Injectable 12.5 Gram(s) IV Push once  dextrose 50% Injectable 25 Gram(s) IV Push once  enalapril 10 milliGRAM(s) Oral daily  glucagon  Injectable 1 milliGRAM(s) IntraMuscular once  insulin lispro (ADMELOG) corrective regimen sliding scale   SubCutaneous at bedtime  insulin lispro (ADMELOG) corrective regimen sliding scale   SubCutaneous three times a day before meals  lidocaine   4% Patch 1 Patch Transdermal every 24 hours  metoprolol tartrate 50 milliGRAM(s) Oral two times a day  piperacillin/tazobactam IVPB.. 3.375 Gram(s) IV Intermittent every 8 hours      PHYSICAL EXAM:  T(C): 36.6 (01-10-23 @ 04:56), Max: 36.7 (01-09-23 @ 12:22)  HR: 60 (01-10-23 @ 04:56) (57 - 71)  BP: 165/90 (01-10-23 @ 04:56) (121/72 - 175/94)  RR: 16 (01-10-23 @ 04:56) (16 - 18)  SpO2: 96% (01-10-23 @ 04:56) (93% - 96%)  Wt(kg): --  I&O's Summary    09 Jan 2023 07:01  -  10 Mitchell 2023 07:00  --------------------------------------------------------  IN: 2690 mL / OUT: 70 mL / NET: 2620 mL          Appearance: Normal	  HEENT:  Eyes are open   Lymphatic: No lymphadenopathy   Cardiovascular: Normal S1 S2, no JVD  Respiratory: normal effort , clear  Gastrointestinal:  Soft, + Rt sided ASTER drain   Skin: No rashes,  warm to touch  Psychiatry:  Mood & affect appropriate  Musculoskeletal: No edema        01-09-23 @ 07:01  -  01-10-23 @ 07:00  --------------------------------------------------------  IN: 2690 mL / OUT: 70 mL / NET: 2620 mL                            13.4   13.65 )-----------( 340      ( 10 Mitchell 2023 05:29 )             39.8               01-10    137  |  102  |  21  ----------------------------<  173<H>  4.1   |  22  |  1.28    Ca    9.3      10 Mitchell 2023 05:27  Phos  2.8     01-10  Mg     2.1     01-10    TPro  7.1  /  Alb  3.4  /  TBili  0.9  /  DBili  x   /  AST  25  /  ALT  70<H>  /  AlkPhos  70  01-10    PT/INR - ( 09 Jan 2023 08:50 )   PT: 15.4 sec;   INR: 1.32 ratio         PTT - ( 09 Jan 2023 08:50 )  PTT:37.5 sec                     Culture - Blood (01.05.23 @ 17:30)   Specimen Source: .Blood Blood-Peripheral   Culture Results: No growth to date.     Culture - Blood (01.05.23 @ 17:25)   Specimen Source: .Blood Blood-Peripheral   Culture Results: No growth to date.     Culture - Urine (01.04.23 @ 06:37)   Specimen Source: Clean Catch Clean Catch (Midstream)   Culture Results: No growth

## 2023-01-10 NOTE — PROGRESS NOTE ADULT - ASSESSMENT
Patient is a 68 year old male with a PMHx of DM, HTN, pancreatitis, who presented to [facility] with a chief complaint of nausea, vomiting, fevers and chills prior to arrival. Patient was found to have cholelithiasis on abdominal ultrasound. Patient underwent MRCP, ERCP and laparoscopic cholecystectomy with GI and Surgery. Patient was found to become tachycardic post - operatively and found to have Afib RVR. Internal Medicine has been consulted on Mr. Wechsler's care for medical management. Patient reports that he follows with Dr. Toribio Justice, cardiology. Patient denies history of atrial fibrillation or arrythmia in the past. Patient reports that his diabetes is controlled on Metformin 500 PO BID at home. States that he has not been on insulin for 7 years. Patient reports that his abdominal pain has improved following cholecystectomy. Patient denies chest pain, palpitations, SOB or dyspnea. Denies nausea or vomiting.       Cholelithiasis/ Cholangitis, S/P OR   - ABD US with --IMPRESSION: Cholelithiasis with equivocal sonographic evidence of acute cholecystitis. Hepatomegaly and hepatic steatosis. -- F/U GI and Surgery recs  - CT A/P  neg for acute intra-abdominal findings  - MRCP with Cholelithiasis, Pancreatic cysts. F/u GI recs for repeat imaging as an outpatient for further management and follow up   - S/P ERCP with  ascending cholangitis, with sludge, stone, pus removal   - S/P OR for lap choley; F/u pathology -- In lab   - C/w IV ABX per ID, currently on Zosyn  - Currently on regular diet, monitor for tolerance   - Pain control  - Monitor CBC, Temp curve, VS and patient closely   - Care per surgery and GI appreciated   - Monitor leukocytosis; trend to normal     Bacteremia   - BC 1/ 2 01/03 with Enterococcus  - F/u repeat BCx2-- NGTD; F/u final   - F/u Echo to R/O vegetations  - Currently on Zosyn per ID  - Trend leukocytosis    - Monitor patient, VS, CBC and temp curve closely  - ID eval appreciated; F/u recs    New Onset Afib   - Patient states that he follows with Dr. Toribio Justice, cardiology. Patient would benefit from ischemic eval as an outpatient. Discussed with patient who states he will follow up with his cardiologist   - Care per EP appreciated  - S/P Cardizem gtt 01/08, Currently on Metoprolol 50 BID and Amiodarone for rate control  - CHADSVASC score of 3, consider full dose AC once cleared from surgery standpoint - pt now on Eliquis, monitor H/H closely   - F/u ECHO -- as noted; Patient will require close outpatient follow up with cardiology for further ischemic work up and management of Mod-Severe MR    - CTA PE protocol - Neg for PE; Ill - defined lesion --> Patient will require close outpatient follow up for repeat imaging as an outpatient for close monitoring; F/u with PCP within 1 week of discharge   - TSH WNL  4.15   - Monitor on Tele; Monitor electrolytes closely to maintain K > 4 and Mg > 2.0   - Possible cardioversion per EP, F/u recs --> EP has signed off.     Abnormal Echo   - TTE as noted with EF of 50-55%, Mod-Severe MR, Minimal AR, Mildly dilated Afib, Hypokinesis of the anteroseptum, septum, and inferolateral wall, Moderate diastolic (Stage II) dysfunction, Mild TR, Mild MI  - Structural heart eval appreciated; F/u recs -->Planned for BERNARDO     ZANE  - Check bladder scan to R/O retention   - C/w IVF for hydration  - Avoid nephrotoxic agents  - Monitor and trend Cr in AM   - Cr down-trending     DM  - A1C of 7.3  - C/w CC diet  - C/w sliding scale   - Monitor glucose levels closely; Avoid hypo/hyperglycemia     HLD  - C/w Lipitor 10 home dose  - Check lipid panel       PPX  - Eliquis 5 BID

## 2023-01-10 NOTE — CONSULT NOTE ADULT - CONSULT REQUESTED DATE/TIME
07-Jan-2023 03:30
08-Jan-2023 09:04
04-Jan-2023 10:30
07-Jan-2023 15:12
10-Mitchell-2023 12:24
10-Mitchell-2023 09:56

## 2023-01-10 NOTE — CONSULT NOTE ADULT - CONSULT REASON
AF RVR
Medical management
Mitral Regurgitation
Bacteremia
Mitral Regurgitation
concern for choledocho

## 2023-01-13 PROBLEM — E11.9 TYPE 2 DIABETES MELLITUS WITHOUT COMPLICATIONS: Chronic | Status: ACTIVE | Noted: 2023-01-03

## 2023-01-13 PROBLEM — Z87.19 PERSONAL HISTORY OF OTHER DISEASES OF THE DIGESTIVE SYSTEM: Chronic | Status: ACTIVE | Noted: 2023-01-03

## 2023-01-13 LAB — SURGICAL PATHOLOGY STUDY: SIGNIFICANT CHANGE UP

## 2023-01-17 DIAGNOSIS — I34.0 NONRHEUMATIC MITRAL (VALVE) INSUFFICIENCY: ICD-10-CM

## 2023-01-22 ENCOUNTER — EMERGENCY (EMERGENCY)
Facility: HOSPITAL | Age: 68
LOS: 0 days | Discharge: HOME | End: 2023-01-22
Attending: EMERGENCY MEDICINE
Payer: MEDICARE

## 2023-01-22 VITALS
OXYGEN SATURATION: 96 % | HEART RATE: 57 BPM | SYSTOLIC BLOOD PRESSURE: 170 MMHG | DIASTOLIC BLOOD PRESSURE: 84 MMHG | RESPIRATION RATE: 16 BRPM | TEMPERATURE: 98 F | HEIGHT: 70 IN | WEIGHT: 210.1 LBS

## 2023-01-22 DIAGNOSIS — R01.1 CARDIAC MURMUR, UNSPECIFIED: ICD-10-CM

## 2023-01-22 DIAGNOSIS — Z90.49 ACQUIRED ABSENCE OF OTHER SPECIFIED PARTS OF DIGESTIVE TRACT: ICD-10-CM

## 2023-01-22 DIAGNOSIS — I10 ESSENTIAL (PRIMARY) HYPERTENSION: ICD-10-CM

## 2023-01-22 DIAGNOSIS — Z79.01 LONG TERM (CURRENT) USE OF ANTICOAGULANTS: ICD-10-CM

## 2023-01-22 DIAGNOSIS — R11.2 NAUSEA WITH VOMITING, UNSPECIFIED: ICD-10-CM

## 2023-01-22 DIAGNOSIS — E11.9 TYPE 2 DIABETES MELLITUS WITHOUT COMPLICATIONS: ICD-10-CM

## 2023-01-22 DIAGNOSIS — Z20.822 CONTACT WITH AND (SUSPECTED) EXPOSURE TO COVID-19: ICD-10-CM

## 2023-01-22 DIAGNOSIS — R74.02 ELEVATION OF LEVELS OF LACTIC ACID DEHYDROGENASE [LDH]: ICD-10-CM

## 2023-01-22 DIAGNOSIS — I48.91 UNSPECIFIED ATRIAL FIBRILLATION: ICD-10-CM

## 2023-01-22 DIAGNOSIS — D72.829 ELEVATED WHITE BLOOD CELL COUNT, UNSPECIFIED: ICD-10-CM

## 2023-01-22 DIAGNOSIS — Z79.84 LONG TERM (CURRENT) USE OF ORAL HYPOGLYCEMIC DRUGS: ICD-10-CM

## 2023-01-22 DIAGNOSIS — R42 DIZZINESS AND GIDDINESS: ICD-10-CM

## 2023-01-22 DIAGNOSIS — Z90.49 ACQUIRED ABSENCE OF OTHER SPECIFIED PARTS OF DIGESTIVE TRACT: Chronic | ICD-10-CM

## 2023-01-22 LAB
ALBUMIN SERPL ELPH-MCNC: 3.4 G/DL — SIGNIFICANT CHANGE UP (ref 3.3–5)
ALP SERPL-CCNC: 77 U/L — SIGNIFICANT CHANGE UP (ref 40–120)
ALT FLD-CCNC: 59 U/L — SIGNIFICANT CHANGE UP (ref 12–78)
ANION GAP SERPL CALC-SCNC: 8 MMOL/L — SIGNIFICANT CHANGE UP (ref 5–17)
APPEARANCE UR: CLEAR — SIGNIFICANT CHANGE UP
AST SERPL-CCNC: 30 U/L — SIGNIFICANT CHANGE UP (ref 15–37)
BASOPHILS # BLD AUTO: 0.09 K/UL — SIGNIFICANT CHANGE UP (ref 0–0.2)
BASOPHILS NFR BLD AUTO: 0.6 % — SIGNIFICANT CHANGE UP (ref 0–2)
BILIRUB SERPL-MCNC: 0.7 MG/DL — SIGNIFICANT CHANGE UP (ref 0.2–1.2)
BILIRUB UR-MCNC: NEGATIVE — SIGNIFICANT CHANGE UP
BUN SERPL-MCNC: 24 MG/DL — HIGH (ref 7–23)
CALCIUM SERPL-MCNC: 9.4 MG/DL — SIGNIFICANT CHANGE UP (ref 8.5–10.1)
CHLORIDE SERPL-SCNC: 103 MMOL/L — SIGNIFICANT CHANGE UP (ref 96–108)
CO2 SERPL-SCNC: 26 MMOL/L — SIGNIFICANT CHANGE UP (ref 22–31)
COLOR SPEC: YELLOW — SIGNIFICANT CHANGE UP
CREAT SERPL-MCNC: 1.4 MG/DL — HIGH (ref 0.5–1.3)
DIFF PNL FLD: NEGATIVE — SIGNIFICANT CHANGE UP
EGFR: 55 ML/MIN/1.73M2 — LOW
EOSINOPHIL # BLD AUTO: 0.34 K/UL — SIGNIFICANT CHANGE UP (ref 0–0.5)
EOSINOPHIL NFR BLD AUTO: 2.3 % — SIGNIFICANT CHANGE UP (ref 0–6)
FLUAV AG NPH QL: SIGNIFICANT CHANGE UP
FLUBV AG NPH QL: SIGNIFICANT CHANGE UP
GLUCOSE SERPL-MCNC: 184 MG/DL — HIGH (ref 70–99)
GLUCOSE UR QL: NEGATIVE — SIGNIFICANT CHANGE UP
HCT VFR BLD CALC: 43.3 % — SIGNIFICANT CHANGE UP (ref 39–50)
HGB BLD-MCNC: 14.6 G/DL — SIGNIFICANT CHANGE UP (ref 13–17)
IMM GRANULOCYTES NFR BLD AUTO: 0.5 % — SIGNIFICANT CHANGE UP (ref 0–0.9)
KETONES UR-MCNC: ABNORMAL
LACTATE SERPL-SCNC: 2.3 MMOL/L — HIGH (ref 0.7–2)
LEUKOCYTE ESTERASE UR-ACNC: NEGATIVE — SIGNIFICANT CHANGE UP
LIDOCAIN IGE QN: 140 U/L — SIGNIFICANT CHANGE UP (ref 73–393)
LYMPHOCYTES # BLD AUTO: 16.3 % — SIGNIFICANT CHANGE UP (ref 13–44)
LYMPHOCYTES # BLD AUTO: 2.46 K/UL — SIGNIFICANT CHANGE UP (ref 1–3.3)
MAGNESIUM SERPL-MCNC: 1.6 MG/DL — SIGNIFICANT CHANGE UP (ref 1.6–2.6)
MCHC RBC-ENTMCNC: 28.6 PG — SIGNIFICANT CHANGE UP (ref 27–34)
MCHC RBC-ENTMCNC: 33.7 GM/DL — SIGNIFICANT CHANGE UP (ref 32–36)
MCV RBC AUTO: 84.7 FL — SIGNIFICANT CHANGE UP (ref 80–100)
MONOCYTES # BLD AUTO: 0.97 K/UL — HIGH (ref 0–0.9)
MONOCYTES NFR BLD AUTO: 6.4 % — SIGNIFICANT CHANGE UP (ref 2–14)
NEUTROPHILS # BLD AUTO: 11.14 K/UL — HIGH (ref 1.8–7.4)
NEUTROPHILS NFR BLD AUTO: 73.9 % — SIGNIFICANT CHANGE UP (ref 43–77)
NITRITE UR-MCNC: NEGATIVE — SIGNIFICANT CHANGE UP
PH UR: 5 — SIGNIFICANT CHANGE UP (ref 5–8)
PLATELET # BLD AUTO: 479 K/UL — HIGH (ref 150–400)
POTASSIUM SERPL-MCNC: 3.7 MMOL/L — SIGNIFICANT CHANGE UP (ref 3.5–5.3)
POTASSIUM SERPL-SCNC: 3.7 MMOL/L — SIGNIFICANT CHANGE UP (ref 3.5–5.3)
PROT SERPL-MCNC: 8.1 GM/DL — SIGNIFICANT CHANGE UP (ref 6–8.3)
PROT UR-MCNC: 30 MG/DL
RBC # BLD: 5.11 M/UL — SIGNIFICANT CHANGE UP (ref 4.2–5.8)
RBC # FLD: 13.1 % — SIGNIFICANT CHANGE UP (ref 10.3–14.5)
RSV RNA NPH QL NAA+NON-PROBE: SIGNIFICANT CHANGE UP
SARS-COV-2 RNA SPEC QL NAA+PROBE: SIGNIFICANT CHANGE UP
SODIUM SERPL-SCNC: 137 MMOL/L — SIGNIFICANT CHANGE UP (ref 135–145)
SP GR SPEC: 1.01 — SIGNIFICANT CHANGE UP (ref 1.01–1.02)
TROPONIN I, HIGH SENSITIVITY RESULT: 5.71 NG/L — SIGNIFICANT CHANGE UP
UROBILINOGEN FLD QL: NEGATIVE — SIGNIFICANT CHANGE UP
WBC # BLD: 15.08 K/UL — HIGH (ref 3.8–10.5)
WBC # FLD AUTO: 15.08 K/UL — HIGH (ref 3.8–10.5)

## 2023-01-22 PROCEDURE — 85025 COMPLETE CBC W/AUTO DIFF WBC: CPT

## 2023-01-22 PROCEDURE — 36415 COLL VENOUS BLD VENIPUNCTURE: CPT

## 2023-01-22 PROCEDURE — 74177 CT ABD & PELVIS W/CONTRAST: CPT | Mod: MA

## 2023-01-22 PROCEDURE — 96374 THER/PROPH/DIAG INJ IV PUSH: CPT | Mod: XU

## 2023-01-22 PROCEDURE — 93005 ELECTROCARDIOGRAM TRACING: CPT

## 2023-01-22 PROCEDURE — 84484 ASSAY OF TROPONIN QUANT: CPT

## 2023-01-22 PROCEDURE — 70450 CT HEAD/BRAIN W/O DYE: CPT | Mod: 26,MA

## 2023-01-22 PROCEDURE — 83690 ASSAY OF LIPASE: CPT

## 2023-01-22 PROCEDURE — 99285 EMERGENCY DEPT VISIT HI MDM: CPT | Mod: 25

## 2023-01-22 PROCEDURE — 87086 URINE CULTURE/COLONY COUNT: CPT

## 2023-01-22 PROCEDURE — 0241U: CPT

## 2023-01-22 PROCEDURE — 81001 URINALYSIS AUTO W/SCOPE: CPT

## 2023-01-22 PROCEDURE — 74177 CT ABD & PELVIS W/CONTRAST: CPT | Mod: 26,MA

## 2023-01-22 PROCEDURE — 80053 COMPREHEN METABOLIC PANEL: CPT

## 2023-01-22 PROCEDURE — 71045 X-RAY EXAM CHEST 1 VIEW: CPT

## 2023-01-22 PROCEDURE — 99285 EMERGENCY DEPT VISIT HI MDM: CPT

## 2023-01-22 PROCEDURE — 83735 ASSAY OF MAGNESIUM: CPT

## 2023-01-22 PROCEDURE — 71045 X-RAY EXAM CHEST 1 VIEW: CPT | Mod: 26

## 2023-01-22 PROCEDURE — 70450 CT HEAD/BRAIN W/O DYE: CPT | Mod: MA

## 2023-01-22 PROCEDURE — 93010 ELECTROCARDIOGRAM REPORT: CPT

## 2023-01-22 PROCEDURE — 83605 ASSAY OF LACTIC ACID: CPT

## 2023-01-22 RX ORDER — ONDANSETRON 8 MG/1
4 TABLET, FILM COATED ORAL ONCE
Refills: 0 | Status: COMPLETED | OUTPATIENT
Start: 2023-01-22 | End: 2023-01-22

## 2023-01-22 RX ORDER — APIXABAN 2.5 MG/1
5 TABLET, FILM COATED ORAL ONCE
Refills: 0 | Status: COMPLETED | OUTPATIENT
Start: 2023-01-22 | End: 2023-01-22

## 2023-01-22 RX ORDER — SODIUM CHLORIDE 9 MG/ML
1000 INJECTION INTRAMUSCULAR; INTRAVENOUS; SUBCUTANEOUS ONCE
Refills: 0 | Status: COMPLETED | OUTPATIENT
Start: 2023-01-22 | End: 2023-01-22

## 2023-01-22 RX ORDER — ONDANSETRON 8 MG/1
1 TABLET, FILM COATED ORAL
Qty: 12 | Refills: 0
Start: 2023-01-22

## 2023-01-22 RX ORDER — MORPHINE SULFATE 50 MG/1
4 CAPSULE, EXTENDED RELEASE ORAL ONCE
Refills: 0 | Status: DISCONTINUED | OUTPATIENT
Start: 2023-01-22 | End: 2023-01-22

## 2023-01-22 RX ADMIN — SODIUM CHLORIDE 2000 MILLILITER(S): 9 INJECTION INTRAMUSCULAR; INTRAVENOUS; SUBCUTANEOUS at 21:46

## 2023-01-22 RX ADMIN — ONDANSETRON 4 MILLIGRAM(S): 8 TABLET, FILM COATED ORAL at 18:31

## 2023-01-22 RX ADMIN — ONDANSETRON 4 MILLIGRAM(S): 8 TABLET, FILM COATED ORAL at 22:46

## 2023-01-22 RX ADMIN — SODIUM CHLORIDE 2000 MILLILITER(S): 9 INJECTION INTRAMUSCULAR; INTRAVENOUS; SUBCUTANEOUS at 18:31

## 2023-01-22 RX ADMIN — APIXABAN 5 MILLIGRAM(S): 2.5 TABLET, FILM COATED ORAL at 20:35

## 2023-01-22 NOTE — ED ADULT NURSE NOTE - OBJECTIVE STATEMENT
pt presents with nausea, vomiting, dizziness that began 1 hour PTA. pt with hx of cholecystomy 1 week ago and [facility].

## 2023-01-22 NOTE — ED PROVIDER NOTE - CLINICAL SUMMARY MEDICAL DECISION MAKING FREE TEXT BOX
Pt given IV fluids and Zofran. Labs and imaging to be obtained. Pt given IV fluids and Zofran. Labs and imaging to be obtained. Pt's labs reveal lactate 2.3, cr. 1.4, wbc is 15 (around baseline) UA is clear, CT abd/pelvis did not reveal any acute process, I did d/w GI given recent hospitalization, stated he can f/u outpatient. After IVF, lactate improved, likely 2/2 dehydration/ Pt's family also requested imaging of head bc he has had intermittent dizziness for weeks, this was ordered.

## 2023-01-22 NOTE — ED ADULT TRIAGE NOTE - CHIEF COMPLAINT QUOTE
pt presents with nausea, vomiting, dizziness that began 1 hour PTA. pt with hx of cholecystomy 1 week ago and [facility].

## 2023-01-22 NOTE — ED PROVIDER NOTE - NSICDXPASTMEDICALHX_GEN_ALL_CORE_FT
PAST MEDICAL HISTORY:  Back injury     Diabetes     H/O acute pancreatitis       Atrial fibrillation

## 2023-01-22 NOTE — ED ADULT NURSE NOTE - CHIEF COMPLAINT QUOTE
pt presents with nausea, vomiting, dizziness that began 1 hour PTA. pt with hx of cholecystomy 1 week ago and [facility].

## 2023-01-22 NOTE — ED PROVIDER NOTE - NS ED ROS FT
Constitutional: No fever or chills  Eyes: No visual changes  HEENT: No throat pain  CV: No chest pain  Resp: No SOB no cough  GI: + Nausea, vomiting  : No dysuria  MSK: No musculoskeletal pain  Skin: No rash  Neuro: No headache

## 2023-01-22 NOTE — ED PROVIDER NOTE - PHYSICAL EXAMINATION
Constitutional: AAOx3, pt actively vomiting  Eyes: PERRL, EOMI  Head: Normocephalic atraumatic  Mouth: dry mucus membranes  Cardiac: 3-5 systolic murmur, otherwise regular   Resp: Lungs CTAB  GI: Abd s/nt/nd  Neuro: CN2-12 intact  Extremities: Intact distal pulses b/l, no calf tenderness, normal ROM b/l UE and LE   Skin: No rashes

## 2023-01-22 NOTE — ED PROVIDER NOTE - OBJECTIVE STATEMENT
69 y/o male with PMHx of DM, HTN, pancreatitis, Afib, and cholelithiasis 1 week s/p cholecystectomy at [facility] presents to ED c/o nausea, vomiting, and dizziness. Pt reports symptoms began 1 hour PTA, when he ate some shrimp at a birthday party and suddenly felt dizzy and nauseous. Wife reports following cholecystectomy 1 week ago pt suddenly developed Afib, but pt naturally went out of Afib a few days later. Drain was placed following the surgery due to an infection, which was removed a few days later. Pt was subsequently placed on PO antibiotics, which he completed 1 week ago. Reports normal BMs and is passing gas. Per wife, pt has been c/o chills, cough, and feeling fatigued.

## 2023-01-22 NOTE — ED PROVIDER NOTE - PATIENT PORTAL LINK FT
You can access the FollowMyHealth Patient Portal offered by [facility] by registering at the following website: [email or web address]. By joining [employer]’s FollowMyHealth portal, you will also be able to view your health information using other applications (apps) compatible with our system.

## 2023-01-24 PROBLEM — I48.91 UNSPECIFIED ATRIAL FIBRILLATION: Chronic | Status: ACTIVE | Noted: 2023-01-22

## 2023-01-24 LAB
CULTURE RESULTS: SIGNIFICANT CHANGE UP
SPECIMEN SOURCE: SIGNIFICANT CHANGE UP

## 2023-01-25 ENCOUNTER — APPOINTMENT (OUTPATIENT)
Dept: ULTRASOUND IMAGING | Facility: CLINIC | Age: 68
End: 2023-01-25

## 2023-01-26 ENCOUNTER — OUTPATIENT (OUTPATIENT)
Dept: OUTPATIENT SERVICES | Facility: HOSPITAL | Age: 68
LOS: 1 days | End: 2023-01-26
Payer: MEDICARE

## 2023-01-26 ENCOUNTER — APPOINTMENT (OUTPATIENT)
Dept: CV DIAGNOSITCS | Facility: HOSPITAL | Age: 68
End: 2023-01-26

## 2023-01-26 VITALS
OXYGEN SATURATION: 94 % | RESPIRATION RATE: 11 BRPM | DIASTOLIC BLOOD PRESSURE: 85 MMHG | SYSTOLIC BLOOD PRESSURE: 141 MMHG | HEART RATE: 46 BPM

## 2023-01-26 VITALS — HEIGHT: 70 IN | WEIGHT: 220.02 LBS

## 2023-01-26 DIAGNOSIS — Z90.49 ACQUIRED ABSENCE OF OTHER SPECIFIED PARTS OF DIGESTIVE TRACT: Chronic | ICD-10-CM

## 2023-01-26 DIAGNOSIS — I34.0 NONRHEUMATIC MITRAL (VALVE) INSUFFICIENCY: ICD-10-CM

## 2023-01-26 PROCEDURE — 93312 ECHO TRANSESOPHAGEAL: CPT | Mod: 26

## 2023-01-26 PROCEDURE — 93320 DOPPLER ECHO COMPLETE: CPT

## 2023-01-26 PROCEDURE — 76377 3D RENDER W/INTRP POSTPROCES: CPT

## 2023-01-26 PROCEDURE — 76377 3D RENDER W/INTRP POSTPROCES: CPT | Mod: 26

## 2023-01-26 PROCEDURE — 93325 DOPPLER ECHO COLOR FLOW MAPG: CPT | Mod: 26

## 2023-01-26 PROCEDURE — 93325 DOPPLER ECHO COLOR FLOW MAPG: CPT

## 2023-01-26 PROCEDURE — 93320 DOPPLER ECHO COMPLETE: CPT | Mod: 26

## 2023-01-26 PROCEDURE — 93312 ECHO TRANSESOPHAGEAL: CPT

## 2023-01-26 PROCEDURE — 82962 GLUCOSE BLOOD TEST: CPT

## 2023-01-26 NOTE — PRE-ANESTHESIA EVALUATION ADULT - NSANTHOSAYNRD_GEN_A_CORE
No. CHARI screening performed.  STOP BANG Legend: 0-2 = LOW Risk; 3-4 = INTERMEDIATE Risk; 5-8 = HIGH Risk

## 2023-01-31 ENCOUNTER — APPOINTMENT (OUTPATIENT)
Dept: INFECTIOUS DISEASE | Facility: CLINIC | Age: 68
End: 2023-01-31
Payer: MEDICARE

## 2023-01-31 VITALS
BODY MASS INDEX: 31.5 KG/M2 | HEART RATE: 58 BPM | WEIGHT: 220 LBS | TEMPERATURE: 98.5 F | OXYGEN SATURATION: 95 % | SYSTOLIC BLOOD PRESSURE: 173 MMHG | HEIGHT: 70 IN | DIASTOLIC BLOOD PRESSURE: 94 MMHG

## 2023-01-31 DIAGNOSIS — R78.81 BACTEREMIA: ICD-10-CM

## 2023-01-31 PROCEDURE — 99213 OFFICE O/P EST LOW 20 MIN: CPT

## 2023-02-01 NOTE — PHYSICAL EXAM
[General Appearance - Alert] : alert [General Appearance - In No Acute Distress] : in no acute distress [Sclera] : the sclera and conjunctiva were normal [Outer Ear] : the ears and nose were normal in appearance [Neck Appearance] : the appearance of the neck was normal [Auscultation Breath Sounds / Voice Sounds] : lungs were clear to auscultation bilaterally [Heart Rate And Rhythm] : heart rate was normal and rhythm regular [Heart Sounds] : normal S1 and S2 [Heart Sounds Gallop] : no gallops [Murmurs] : no murmurs [Heart Sounds Pericardial Friction Rub] : no pericardial rub [Edema] : there was no peripheral edema [Bowel Sounds] : normal bowel sounds [Abdomen Soft] : soft [Abdomen Tenderness] : non-tender [] : no hepato-splenomegaly [Abdomen Mass (___ Cm)] : no abdominal mass palpated [No Palpable Adenopathy] : no palpable adenopathy [Musculoskeletal - Swelling] : no joint swelling [Nail Clubbing] : no clubbing  or cyanosis of the fingernails [Motor Tone] : muscle strength and tone were normal [Skin Lesions] : no skin lesions [No Focal Deficits] : no focal deficits [Affect] : the affect was normal

## 2023-02-01 NOTE — HISTORY OF PRESENT ILLNESS
[FreeTextEntry1] : 8-year-old male PMH significant for DM, HTN, Severe MVR who presents to ID office for follow up. Admitted to [facility] 1/3/23 - 1/10/23 for cholecystitis. BCx (1/3) Enterococcus gallinarum (in ¼ bottles)\par BCx (1/5) NGTD. On 1/4 s/p ERCP with evidence of Ascending cholangitis with Sludge, stones, and pus were completely removed from the dilated common bile duct with balloon sweeps. On 1/5 s/p Laparoscopic cholecystectomy. Treated with Zosyn while admitted and discharged on Augmentin to complete total 14 days of antibiotics. Completed antibiotic course without issue.\par \par Of note, seen in the ED On 1/22/23 due to vomiting and dizziness. CT A/P (1/22) with No imaging evidence of an acute abnormality. Discharged off of antibiotics. Denies any episodes of N/V/D since. Notes some vague right sided abdominal pain. Denies chills or fevers. \par

## 2023-02-01 NOTE — ASSESSMENT
[FreeTextEntry1] : 8-year-old male PMH significant for DM, HTN, Severe MVR who presents to ID office for follow up. \par \par Admitted to [facility] 1/3/23 - 1/10/23 for cholecystitis. \par BCx (1/3) Enterococcus gallinarum (in ¼ bottles)\par BCx (1/5) NGTD. \par On 1/4 s/p ERCP with evidence of Ascending cholangitis with Sludge, stones, and pus were completely removed from the dilated common bile duct with balloon sweeps. \par On 1/5 s/p Laparoscopic cholecystectomy. \par Treated with Zosyn while admitted and discharged on Augmentin to complete total 14 days of antibiotics. \par \par Presented to  ED On 1/22/23 due to vomiting and dizziness. \par CT A/P (1/22) with No imaging evidence of an acute abnormality. \par \par Will repeat CBC, CMP and 2x surveillance BCx\par Monitor off of additional antibiotics\par Followup with Cardiology and CTS given MVR\par

## 2023-02-02 ENCOUNTER — TRANSCRIPTION ENCOUNTER (OUTPATIENT)
Age: 68
End: 2023-02-02

## 2023-02-06 LAB
ALBUMIN SERPL ELPH-MCNC: 4.4 G/DL
ALP BLD-CCNC: 70 U/L
ALT SERPL-CCNC: 29 U/L
ANION GAP SERPL CALC-SCNC: 14 MMOL/L
AST SERPL-CCNC: 21 U/L
BACTERIA BLD CULT: NORMAL
BACTERIA BLD CULT: NORMAL
BASOPHILS # BLD AUTO: 0.1 K/UL
BASOPHILS NFR BLD AUTO: 1.1 %
BILIRUB SERPL-MCNC: 0.7 MG/DL
BUN SERPL-MCNC: 22 MG/DL
CALCIUM SERPL-MCNC: 9.9 MG/DL
CHLORIDE SERPL-SCNC: 104 MMOL/L
CO2 SERPL-SCNC: 23 MMOL/L
CREAT SERPL-MCNC: 1.2 MG/DL
EGFR: 66 ML/MIN/1.73M2
EOSINOPHIL # BLD AUTO: 0.62 K/UL
EOSINOPHIL NFR BLD AUTO: 6.6 %
GLUCOSE SERPL-MCNC: 145 MG/DL
HCT VFR BLD CALC: 41.7 %
HGB BLD-MCNC: 14.3 G/DL
IMM GRANULOCYTES NFR BLD AUTO: 0.3 %
LYMPHOCYTES # BLD AUTO: 1.81 K/UL
LYMPHOCYTES NFR BLD AUTO: 19.4 %
MAN DIFF?: NORMAL
MCHC RBC-ENTMCNC: 28.4 PG
MCHC RBC-ENTMCNC: 34.3 GM/DL
MCV RBC AUTO: 82.7 FL
MONOCYTES # BLD AUTO: 0.77 K/UL
MONOCYTES NFR BLD AUTO: 8.2 %
NEUTROPHILS # BLD AUTO: 6.02 K/UL
NEUTROPHILS NFR BLD AUTO: 64.4 %
PLATELET # BLD AUTO: 282 K/UL
POTASSIUM SERPL-SCNC: 4.3 MMOL/L
PROT SERPL-MCNC: 7.1 G/DL
RBC # BLD: 5.04 M/UL
RBC # FLD: 13.8 %
SODIUM SERPL-SCNC: 141 MMOL/L
WBC # FLD AUTO: 9.35 K/UL

## 2023-09-20 ENCOUNTER — APPOINTMENT (OUTPATIENT)
Dept: NEUROLOGY | Facility: CLINIC | Age: 68
End: 2023-09-20
Payer: MEDICARE

## 2023-09-20 VITALS
DIASTOLIC BLOOD PRESSURE: 70 MMHG | HEIGHT: 70 IN | WEIGHT: 220 LBS | SYSTOLIC BLOOD PRESSURE: 126 MMHG | BODY MASS INDEX: 31.5 KG/M2

## 2023-09-20 DIAGNOSIS — Z78.9 OTHER SPECIFIED HEALTH STATUS: ICD-10-CM

## 2023-09-20 DIAGNOSIS — H81.399 OTHER PERIPHERAL VERTIGO, UNSPECIFIED EAR: ICD-10-CM

## 2023-09-20 PROCEDURE — 99204 OFFICE O/P NEW MOD 45 MIN: CPT

## 2023-09-20 RX ORDER — UBIDECARENONE/VIT E ACET 100MG-5
CAPSULE ORAL
Refills: 0 | Status: COMPLETED | COMMUNITY
End: 2023-09-20

## 2023-09-20 RX ORDER — ATORVASTATIN CALCIUM 80 MG/1
80 TABLET, FILM COATED ORAL
Refills: 0 | Status: ACTIVE | COMMUNITY

## 2023-09-20 RX ORDER — SODIUM SULFATE, POTASSIUM SULFATE, MAGNESIUM SULFATE 17.5; 3.13; 1.6 G/ML; G/ML; G/ML
17.5-3.13-1.6 SOLUTION, CONCENTRATE ORAL
Qty: 1 | Refills: 0 | Status: COMPLETED | COMMUNITY
Start: 2021-07-16 | End: 2023-09-20

## 2023-09-20 RX ORDER — ATORVASTATIN CALCIUM 10 MG/1
10 TABLET, FILM COATED ORAL
Refills: 0 | Status: COMPLETED | COMMUNITY
End: 2023-09-20

## 2023-09-20 RX ORDER — PANTOPRAZOLE 40 MG/1
40 TABLET, DELAYED RELEASE ORAL
Refills: 0 | Status: ACTIVE | COMMUNITY

## 2023-09-20 RX ORDER — ASPIRIN 81 MG
81 TABLET, DELAYED RELEASE (ENTERIC COATED) ORAL
Refills: 0 | Status: ACTIVE | COMMUNITY

## 2023-09-20 RX ORDER — METOPROLOL SUCCINATE 25 MG/1
25 TABLET, EXTENDED RELEASE ORAL
Refills: 0 | Status: ACTIVE | COMMUNITY

## 2023-09-20 RX ORDER — METFORMIN HYDROCHLORIDE 500 MG/1
500 TABLET, COATED ORAL
Refills: 0 | Status: ACTIVE | COMMUNITY

## 2023-09-22 ENCOUNTER — APPOINTMENT (OUTPATIENT)
Dept: MRI IMAGING | Facility: CLINIC | Age: 68
End: 2023-09-22
Payer: MEDICARE

## 2023-09-22 ENCOUNTER — OUTPATIENT (OUTPATIENT)
Dept: OUTPATIENT SERVICES | Facility: HOSPITAL | Age: 68
LOS: 1 days | End: 2023-09-22
Payer: MEDICARE

## 2023-09-22 DIAGNOSIS — Z90.49 ACQUIRED ABSENCE OF OTHER SPECIFIED PARTS OF DIGESTIVE TRACT: Chronic | ICD-10-CM

## 2023-09-22 DIAGNOSIS — Z00.8 ENCOUNTER FOR OTHER GENERAL EXAMINATION: ICD-10-CM

## 2023-09-22 PROCEDURE — A9585: CPT

## 2023-09-22 PROCEDURE — 70553 MRI BRAIN STEM W/O & W/DYE: CPT | Mod: 26,MH

## 2023-09-22 PROCEDURE — 70553 MRI BRAIN STEM W/O & W/DYE: CPT | Mod: MH

## 2024-06-10 NOTE — ED ADULT NURSE NOTE - WAS YOUR LAST COVID-19 VACCINE GREATER THAN OR EQUAL TO TWO MONTHS AGO?
Initiate Treatment: fluocinonide 0.05 % topical ointment \\nQuantity: 45.0 g  Days Supply: 30\\nSig: Apply to scaly patches on body twice daily if flared.\\n\\nalclometasone 0.05 % topical ointment \\nQuantity: 30.0 g  Days Supply: 30\\nSig: Apply to eczema patches on face BID if flared
Detail Level: Zone
Render In Strict Bullet Format?: No
Initiate Treatment: hydroxyzine HCl 10 mg tablet \\nQuantity: 30.0 Tablet\\nSig: Take one tab at bedtime if itching. Can make you drowsy
Plan: Fluocinonide oint and alclometsone oint will help treat itching
Yes

## 2025-06-01 NOTE — CONSULT NOTE ADULT - SUBJECTIVE AND OBJECTIVE BOX
Chief Complaint:  Patient is a 68y old  Male who presents with a chief complaint of Choledocholithiasis (2023 05:08)      HPI:    Allergies:  No Known Allergies      Home Medications:    Hospital Medications:  dextrose 5%. 1000 milliLiter(s) IV Continuous <Continuous>  dextrose 5%. 1000 milliLiter(s) IV Continuous <Continuous>  dextrose 50% Injectable 25 Gram(s) IV Push once  dextrose 50% Injectable 12.5 Gram(s) IV Push once  dextrose 50% Injectable 25 Gram(s) IV Push once  dextrose Oral Gel 15 Gram(s) Oral once PRN  enalapril 10 milliGRAM(s) Oral daily  enoxaparin Injectable 40 milliGRAM(s) SubCutaneous every 24 hours  glucagon  Injectable 1 milliGRAM(s) IntraMuscular once  insulin lispro (ADMELOG) corrective regimen sliding scale   SubCutaneous three times a day before meals  insulin lispro (ADMELOG) corrective regimen sliding scale   SubCutaneous at bedtime  lactated ringers. 1000 milliLiter(s) IV Continuous <Continuous>  magnesium sulfate  IVPB 2 Gram(s) IV Intermittent once  piperacillin/tazobactam IVPB.. 3.375 Gram(s) IV Intermittent every 8 hours  potassium chloride  10 mEq/100 mL IVPB 10 milliEquivalent(s) IV Intermittent every 1 hour  potassium phosphate IVPB 30 milliMole(s) IV Intermittent once      PMHX/PSHX:  Back injury    H/O acute pancreatitis    Diabetes    No significant past surgical history        Family history:      Social History:     ROS:     General:  No weight loss, fevers, chills, night sweats, fatigue  Eyes:  No vision changes, no yellowing of eyes   ENT:  No throat pain, runny nose  CV:  No chest pain, palpitations  Resp:  No SOB, cough, wheezing  GI:  See HPI  :  No burning with urination, no hematuria   Muscle:  No muscle pain, weakness  Neuro:  No numbness/tingling, memory problems  Psych:  No fatigue, insomnia, mood problems  Heme:  No easy bruisability  Skin:  No rash, itching       PHYSICAL EXAM:     GENERAL:  Appears stated age, well-groomed, well-nourished, no distress  HEENT:  NC/AT,  conjunctivae clear and pink,  no JVD  CHEST:  Full & symmetric excursion, no increased effort, breath sounds clear  HEART:  Regular rhythm, S1, S2, no murmur/rub/S3/S4, no abdominal bruit, no edema  ABDOMEN:  Soft, non-tender, non-distended, normoactive bowel sounds,  no masses ,  EXTREMITIES:  no cyanosis,clubbing or edema  SKIN:  No rash/erythema/ecchymoses/petechiae/wounds/abscess/warm/dry  NEURO:  Alert, oriented    Vital Signs:  Vital Signs Last 24 Hrs  T(C): 36.2 (2023 07:10), Max: 38.9 (2023 20:27)  T(F): 97.1 (2023 07:10), Max: 102.1 (2023 20:27)  HR: 72 (2023 10:15) (66 - 118)  BP: 157/97 (2023 10:15) (102/55 - 157/97)  BP(mean): 70 (2023 04:15) (70 - 80)  RR: 18 (2023 10:15) (16 - 20)  SpO2: 100% (2023 10:15) (93% - 100%)    Parameters below as of 2023 10:15  Patient On (Oxygen Delivery Method): nasal cannula  O2 Flow (L/min): 2    Daily Height in cm: 177.8 (2023 20:27)    Daily     LABS:                        13.3   9.54  )-----------( 202      ( 2023 05:35 )             39.5     01-04    139  |  104  |  20  ----------------------------<  157<H>  3.0<L>   |  24  |  1.26    Ca    7.6<L>      2023 05:35  Phos  2.2     01-04  Mg     1.7     -04    TPro  5.8<L>  /  Alb  3.2<L>  /  TBili  4.5<H>  /  DBili  x   /  AST  250<H>  /  ALT  471<H>  /  AlkPhos  78  01-04    LIVER FUNCTIONS - ( 2023 05:35 )  Alb: 3.2 g/dL / Pro: 5.8 g/dL / ALK PHOS: 78 U/L / ALT: 471 U/L / AST: 250 U/L / GGT: x             Urinalysis Basic - ( 2023 06:37 )    Color: Dark Yellow / Appearance: Clear / S.047 / pH: x  Gluc: x / Ketone: Negative  / Bili: Moderate / Urobili: 4 mg/dL   Blood: x / Protein: Trace / Nitrite: Negative   Leuk Esterase: Negative / RBC: 2 /hpf / WBC 5 /HPF   Sq Epi: x / Non Sq Epi: 1 /hpf / Bacteria: Negative      Amylase Serum--      Lipase [id number]       Ammonia--      Imaging:  < from: CT Abdomen and Pelvis w/ IV Cont (23 @ 23:26) >    FINDINGS:  LOWER CHEST: Within normal limits.    LIVER: Within normal limits.  BILE DUCTS: Normal caliber.  GALLBLADDER: Cholelithiasis.  SPLEEN: Within normal limits.  PANCREAS: Within normal limits.  ADRENALS: Within normal limits.  KIDNEYS/URETERS: Left renal cyst and additional subcentimeter   hypodensities, too small to characterize.    BLADDER: Within normal limits.  REPRODUCTIVE ORGANS: Prostate within normal limits.    BOWEL: No bowel obstruction. Colonic diverticulosis without evidence of   acute diverticulitis. Appendix is normal.  PERITONEUM: No ascites.  VESSELS: Atherosclerotic changes.  RETROPERITONEUM/LYMPH NODES: No lymphadenopathy.  ABDOMINAL WALL: Within normal limits.  BONES: Degenerative changes.    IMPRESSION:  No acute abdominopelvic abnormality.    --- End of Report ---    < end of copied text >        < from: US Abdomen Upper Quadrant Right (23 @ 02:02) >  FINDINGS:  Liver: 21.1 cm Increased echogenicity.  Bile ducts: Normal caliber. Common bile duct measures 7 mm.  Gallbladder: Layering stones and sludge with minimal gallbladder wall   thickening. Negative sonographic Mary sign.  Pancreas: Visualized portions are within normal limits.  Right kidney: 13.1 cm. No hydronephrosis.  Ascites: None.  IVC: Visualized portions are within normal limits.    IMPRESSION:  Cholelithiasis with equivocal sonographic evidence of acute cholecystitis.    Hepatomegaly and hepatic steatosis.    --- End of Report ---    < end of copied text >             Chief Complaint:  Patient is a 68y old  Male who presents with a chief complaint of Choledocholithiasis (2023 05:08)      HPI: 68 year old male with PMHx significant for DM, HTN, past choledocho s/p ERCP 1 year ago and pancreatitis, presenting with nausea, vomiting, fevers, and chills of the past day. Symptoms started yesterday evening. Denies abdominal pain. Reports passing flatus and having BMs. At that time, declined cholecystectomy.    In ED, patient febrile and found to have elevated transaminases + elevated T. Bili. RUQ US and CT with normal ducts.     Allergies:  No Known Allergies      Home Medications:    Hospital Medications:  dextrose 5%. 1000 milliLiter(s) IV Continuous <Continuous>  dextrose 5%. 1000 milliLiter(s) IV Continuous <Continuous>  dextrose 50% Injectable 25 Gram(s) IV Push once  dextrose 50% Injectable 12.5 Gram(s) IV Push once  dextrose 50% Injectable 25 Gram(s) IV Push once  dextrose Oral Gel 15 Gram(s) Oral once PRN  enalapril 10 milliGRAM(s) Oral daily  enoxaparin Injectable 40 milliGRAM(s) SubCutaneous every 24 hours  glucagon  Injectable 1 milliGRAM(s) IntraMuscular once  insulin lispro (ADMELOG) corrective regimen sliding scale   SubCutaneous three times a day before meals  insulin lispro (ADMELOG) corrective regimen sliding scale   SubCutaneous at bedtime  lactated ringers. 1000 milliLiter(s) IV Continuous <Continuous>  magnesium sulfate  IVPB 2 Gram(s) IV Intermittent once  piperacillin/tazobactam IVPB.. 3.375 Gram(s) IV Intermittent every 8 hours  potassium chloride  10 mEq/100 mL IVPB 10 milliEquivalent(s) IV Intermittent every 1 hour  potassium phosphate IVPB 30 milliMole(s) IV Intermittent once      PMHX/PSHX:  Back injury    H/O acute pancreatitis    Diabetes    No significant past surgical history        Family history:      Social History:     ROS:     General:  +fevers, chills  Eyes:  No vision changes, no yellowing of eyes   ENT:  No throat pain, runny nose  CV:  No chest pain, palpitations  Resp:  No SOB, cough, wheezing  GI:  See HPI  :  No burning with urination, no hematuria   Muscle:  No muscle pain, weakness  Neuro:  No numbness/tingling, memory problems  Psych:  No fatigue, insomnia, mood problems  Heme:  No easy bruisability  Skin:  No rash, itching       PHYSICAL EXAM:     GENERAL:  no distress  HEENT:  NC/AT,  scleral icterus  CHEST:  Full & symmetric excursion, no increased effort, breath sounds clear  HEART:  Regular rhythm, S1, S2, no murmur/rub/S3/S4, no abdominal bruit, no edema  ABDOMEN:  Soft, non-tender, non-distended, normoactive bowel sounds,  no masses ,  EXTREMITIES:  no cyanosis,clubbing or edema  SKIN:  No rash/erythema/ecchymoses/petechiae/wounds/abscess/warm/dry  NEURO:  Alert, oriented    Vital Signs:  Vital Signs Last 24 Hrs  T(C): 36.2 (2023 07:10), Max: 38.9 (2023 20:27)  T(F): 97.1 (2023 07:10), Max: 102.1 (2023 20:27)  HR: 72 (2023 10:15) (66 - 118)  BP: 157/97 (2023 10:15) (102/55 - 157/97)  BP(mean): 70 (2023 04:15) (70 - 80)  RR: 18 (2023 10:15) (16 - 20)  SpO2: 100% (2023 10:15) (93% - 100%)    Parameters below as of 2023 10:15  Patient On (Oxygen Delivery Method): nasal cannula  O2 Flow (L/min): 2    Daily Height in cm: 177.8 (2023 20:27)    Daily     LABS:                        13.3   9.54  )-----------(       ( 2023 05:35 )             39.5     01-04    139  |  104  |  20  ----------------------------<  157<H>  3.0<L>   |  24  |  1.26    Ca    7.6<L>      2023 05:35  Phos  2.2     01-04  Mg     1.7     01-04    TPro  5.8<L>  /  Alb  3.2<L>  /  TBili  4.5<H>  /  DBili  x   /  AST  250<H>  /  ALT  471<H>  /  AlkPhos  78  -    LIVER FUNCTIONS - ( 2023 05:35 )  Alb: 3.2 g/dL / Pro: 5.8 g/dL / ALK PHOS: 78 U/L / ALT: 471 U/L / AST: 250 U/L / GGT: x             Urinalysis Basic - ( 2023 06:37 )    Color: Dark Yellow / Appearance: Clear / S.047 / pH: x  Gluc: x / Ketone: Negative  / Bili: Moderate / Urobili: 4 mg/dL   Blood: x / Protein: Trace / Nitrite: Negative   Leuk Esterase: Negative / RBC: 2 /hpf / WBC 5 /HPF   Sq Epi: x / Non Sq Epi: 1 /hpf / Bacteria: Negative      Amylase Serum--      Lipase [id number]       Ammonia--      Imaging:  < from: CT Abdomen and Pelvis w/ IV Cont (23 @ 23:26) >    FINDINGS:  LOWER CHEST: Within normal limits.    LIVER: Within normal limits.  BILE DUCTS: Normal caliber.  GALLBLADDER: Cholelithiasis.  SPLEEN: Within normal limits.  PANCREAS: Within normal limits.  ADRENALS: Within normal limits.  KIDNEYS/URETERS: Left renal cyst and additional subcentimeter   hypodensities, too small to characterize.    BLADDER: Within normal limits.  REPRODUCTIVE ORGANS: Prostate within normal limits.    BOWEL: No bowel obstruction. Colonic diverticulosis without evidence of   acute diverticulitis. Appendix is normal.  PERITONEUM: No ascites.  VESSELS: Atherosclerotic changes.  RETROPERITONEUM/LYMPH NODES: No lymphadenopathy.  ABDOMINAL WALL: Within normal limits.  BONES: Degenerative changes.    IMPRESSION:  No acute abdominopelvic abnormality.    --- End of Report ---    < end of copied text >        < from: US Abdomen Upper Quadrant Right (23 @ 02:02) >  FINDINGS:  Liver: 21.1 cm Increased echogenicity.  Bile ducts: Normal caliber. Common bile duct measures 7 mm.  Gallbladder: Layering stones and sludge with minimal gallbladder wall   thickening. Negative sonographic Mary sign.  Pancreas: Visualized portions are within normal limits.  Right kidney: 13.1 cm. No hydronephrosis.  Ascites: None.  IVC: Visualized portions are within normal limits.    IMPRESSION:  Cholelithiasis with equivocal sonographic evidence of acute cholecystitis.    Hepatomegaly and hepatic steatosis.    --- End of Report ---    < end of copied text >           28

## 2025-06-07 NOTE — ED PROVIDER NOTE - FAMILY DETAILS FREE TEXT FOR MDM ADDL HISTORY OBTAINED FROM QUESTION
IMPORTANT EVENTS THIS SHIFT:  Patient is a/o x4. No c/o pain, n/v; tolerating diet.   HANANE drain intact.  Ambulating independently.  Voiding without difficulties, passing flatus, Bm.    Discharge Note  Patient cleared for dc per MD. Discharge, medication, and follow up instructions have been discussed with the patient. Patient verbalized understanding. All questions have been addressed. HANANE drain education provided with successful return demonstration by patient; Supplies provided.   Belongings taken with.   Left with .     Daily Self Check provided: Signs of Infection and Sepsis, Pneumonia, and General IMPORTANT EVENTS COMING UP/GOALS (PLEASE INCLUDE WHITE BOARD AND DISCHARGE BOARD UPDATES):       PATIENT SPECIAL NEEDS/ACCOMMODATIONS:                                  Wife

## (undated) DEVICE — SYR LUER LOK 20CC

## (undated) DEVICE — ENDOCATCH 10MM SPECIMEN POUCH

## (undated) DEVICE — CATH IV SAFE BC 22G X 1" (BLUE)

## (undated) DEVICE — FOLEY HOLDER STATLOCK 2 WAY ADULT

## (undated) DEVICE — DRAPE TOWEL BLUE 17" X 24"

## (undated) DEVICE — TROCAR COVIDIEN VERSAONE BLADELESS FIXATION 11MM STANDARD

## (undated) DEVICE — WARMING BLANKET UPPER ADULT

## (undated) DEVICE — TUBING SUCTION 20FT

## (undated) DEVICE — PACK IV START WITH CHG

## (undated) DEVICE — SYR ALLIANCE II INFLATION 60ML

## (undated) DEVICE — BALLOON US ENDO

## (undated) DEVICE — TROCAR COVIDIEN VERSAONE BLADED FIXATION 11MM STANDARD

## (undated) DEVICE — SOL IRR POUR NS 0.9% 500ML

## (undated) DEVICE — BRUSH COLONOSCOPY CYTOLOGY

## (undated) DEVICE — GLV 8 PROTEXIS (WHITE)

## (undated) DEVICE — BRUSH CYTO RAP EXCHG 3MM

## (undated) DEVICE — BLADE SCALPEL SAFETYLOCK #15

## (undated) DEVICE — SHEARS COVIDIEN ENDO SHEAR 5MM X 31CM W UNIPOLAR CAUTERY

## (undated) DEVICE — D HELP - CLEARVIEW CLEARIFY SYSTEM

## (undated) DEVICE — FORCEP RADIAL JAW 4 JUMBO 2.8MM 3.2MM 240CM ORANGE DISP

## (undated) DEVICE — GLV 7.5 PROTEXIS (WHITE)

## (undated) DEVICE — SENSOR O2 FINGER ADULT

## (undated) DEVICE — BITE BLOCK ADULT 20 X 27MM (GREEN)

## (undated) DEVICE — VENODYNE/SCD SLEEVE CALF LARGE

## (undated) DEVICE — NDL BIOPSY MONOPTY 18G X 20CM

## (undated) DEVICE — GLV 7 PROTEXIS (WHITE)

## (undated) DEVICE — TROCAR COVIDIEN VERSAPORT BLADELESS OPTICAL 5MM STANDARD

## (undated) DEVICE — SOL IRR POUR H2O 250ML

## (undated) DEVICE — TROCAR COVIDIEN VERSASTEP PLUS 11MM STANDARD

## (undated) DEVICE — DRAPE C ARM UNIVERSAL

## (undated) DEVICE — IRRIGATOR BIO SHIELD

## (undated) DEVICE — CATH IV SAFE BC 20G X 1.16" (PINK)

## (undated) DEVICE — TROCAR COVIDIEN BLUNT TIP HASSAN 10MM

## (undated) DEVICE — MARKING PEN W RULER

## (undated) DEVICE — MEDICATION LABELS W MARKER

## (undated) DEVICE — DRAPE INSTRUMENT POUCH 6.75" X 11"

## (undated) DEVICE — INSUFFLATION NDL COVIDIEN VERSASTEP 14G LONG

## (undated) DEVICE — CATH BLLN ULTRASONIC ENSOSCOPE

## (undated) DEVICE — POSITIONER FOAM HEAD CRADLE (PINK)

## (undated) DEVICE — APPLICATOR VISTASEAL LAP DUAL 45CM FLEX

## (undated) DEVICE — TUBING TRUWAVE PRESSURE MALE/FEMALE 72"

## (undated) DEVICE — DRSG TELFA 3 X 8

## (undated) DEVICE — VALVE YELLOW PORT SEAL PLUS 5MM

## (undated) DEVICE — POSITIONER FOAM EGG CRATE ULNAR 2PCS (PINK)

## (undated) DEVICE — STOPCOCK 3 WAY W TUBE 35"

## (undated) DEVICE — GOWN TRIMAX LG

## (undated) DEVICE — DRSG STERISTRIPS 0.5 X 4"

## (undated) DEVICE — DRAPE MAYO STAND 30"

## (undated) DEVICE — GLV 6.5 PROTEXIS (WHITE)

## (undated) DEVICE — TUBING IRRIGATION DAVOL SYSTEM X STREAM

## (undated) DEVICE — ELCTR GROUNDING PAD ADULT COVIDIEN

## (undated) DEVICE — NDL INJ SCLERO INTERJECT 25G

## (undated) DEVICE — TUBING INSUFFLATION LAP FILTER 10FT

## (undated) DEVICE — BIOPSY FORCEP RADIAL JAW 4 STANDARD WITH NEEDLE

## (undated) DEVICE — SYR LUER LOK 30CC

## (undated) DEVICE — SUT POLYSORB 0 36" GU-46

## (undated) DEVICE — BLADE SCALPEL SAFETYLOCK #10

## (undated) DEVICE — TROCAR APPLIED MEDICAL KII BALLOON BLUNT TIP 12MM X 100MM

## (undated) DEVICE — SOL INJ NS 0.9% 500ML 2 PORT

## (undated) DEVICE — SPHINCTEROTOME CLEVERCUT WIRE 25MM  2.8MM X 170CM

## (undated) DEVICE — DISSECTOR ENDO PEANUT 5MM

## (undated) DEVICE — SNARE POLYP MINI ACCUSNR 1.5 X 3CM

## (undated) DEVICE — SNARE POLYP SENS SM 13MM 240CM

## (undated) DEVICE — TUBING SUCTION CONN 6FT STERILE

## (undated) DEVICE — TUBING IV SET GRAVITY 3Y 100" MACRO

## (undated) DEVICE — NDL INJ SCLERO INTERJECT 23G

## (undated) DEVICE — SPECIMEN CONTAINER 100ML

## (undated) DEVICE — PAPIL BILRTH II 6-5FRX0.035IN

## (undated) DEVICE — GLV 8.5 PROTEXIS (WHITE)

## (undated) DEVICE — PACK LAP CHOLE LAP APPENDECTOMY

## (undated) DEVICE — SUT BIOSYN 4-0 18" P-12

## (undated) DEVICE — LITHOTRIPY BASKET TRAPEZOID 2.5CM

## (undated) DEVICE — CATH IV SAFE INSYTE 14G X 1.75" (ORANGE)

## (undated) DEVICE — DRSG OPSITE 2.5 X 2"

## (undated) DEVICE — COLONOSCOPE 2416901: Type: DURABLE MEDICAL EQUIPMENT

## (undated) DEVICE — TROCAR COVIDIEN VERSASTEP SLEEVE

## (undated) DEVICE — SUCTION YANKAUER NO CONTROL VENT

## (undated) DEVICE — INSUFFLATION NDL COVIDIEN STEP 14G FOR STEP/VERSASTEP